# Patient Record
Sex: FEMALE | Race: ASIAN | NOT HISPANIC OR LATINO | Employment: UNEMPLOYED | URBAN - METROPOLITAN AREA
[De-identification: names, ages, dates, MRNs, and addresses within clinical notes are randomized per-mention and may not be internally consistent; named-entity substitution may affect disease eponyms.]

---

## 2017-01-03 ENCOUNTER — RADIANT APPOINTMENT (OUTPATIENT)
Dept: ULTRASOUND IMAGING | Facility: CLINIC | Age: 47
End: 2017-01-03
Attending: FAMILY MEDICINE
Payer: COMMERCIAL

## 2017-01-03 DIAGNOSIS — N83.201 RIGHT OVARIAN CYST: ICD-10-CM

## 2017-01-03 DIAGNOSIS — N92.0 MENORRHAGIA WITH REGULAR CYCLE: ICD-10-CM

## 2017-01-03 DIAGNOSIS — N94.6 DYSMENORRHEA: ICD-10-CM

## 2017-01-03 DIAGNOSIS — D25.1 INTRAMURAL LEIOMYOMA OF UTERUS: ICD-10-CM

## 2017-01-03 PROCEDURE — 76830 TRANSVAGINAL US NON-OB: CPT | Performed by: OBSTETRICS & GYNECOLOGY

## 2017-01-03 PROCEDURE — 76856 US EXAM PELVIC COMPLETE: CPT | Performed by: OBSTETRICS & GYNECOLOGY

## 2017-01-07 ENCOUNTER — TRANSFERRED RECORDS (OUTPATIENT)
Dept: HEALTH INFORMATION MANAGEMENT | Facility: CLINIC | Age: 47
End: 2017-01-07

## 2017-01-09 NOTE — PROGRESS NOTES
Quick Note:    Please call pt with results below:     Multiple uterine fibroids . But no further ovarian cyst noted. However, thickened endometrial lining seen - recommend that pt see ob/gyn specialists as referred 12/5/2016. Please fax this result to them.  ______

## 2017-03-01 DIAGNOSIS — F41.1 ANXIETY STATE: ICD-10-CM

## 2017-03-01 NOTE — TELEPHONE ENCOUNTER
Sertraline HCl Oral Tablet 100 MG     Last Written Prescription Date: 08/18/2016  Last Fill Quantity: 90, # refills: 0  Last Office Visit with [facility] primary care provider:  12/05/2016        Last PHQ-9 score on record=   PHQ-9 SCORE 12/19/2014   Total Score 0

## 2017-03-02 RX ORDER — SERTRALINE HYDROCHLORIDE 100 MG/1
TABLET, FILM COATED ORAL
Qty: 90 TABLET | Refills: 0 | Status: SHIPPED | OUTPATIENT
Start: 2017-03-02 | End: 2017-06-25

## 2017-03-02 NOTE — TELEPHONE ENCOUNTER
For anxiety.    Prescription approved per [facility] Refill Protocol.  Gale Hogan RN

## 2017-04-20 ENCOUNTER — OFFICE VISIT (OUTPATIENT)
Dept: FAMILY MEDICINE | Facility: CLINIC | Age: 47
End: 2017-04-20
Payer: COMMERCIAL

## 2017-04-20 VITALS
SYSTOLIC BLOOD PRESSURE: 102 MMHG | TEMPERATURE: 98.2 F | HEART RATE: 93 BPM | HEIGHT: 65 IN | BODY MASS INDEX: 18.63 KG/M2 | WEIGHT: 111.8 LBS | OXYGEN SATURATION: 97 % | DIASTOLIC BLOOD PRESSURE: 68 MMHG

## 2017-04-20 DIAGNOSIS — R10.813 TENDERNESS AT MCBURNEY'S POINT: ICD-10-CM

## 2017-04-20 DIAGNOSIS — R10.31 RLQ ABDOMINAL PAIN: ICD-10-CM

## 2017-04-20 DIAGNOSIS — R19.7 DIARRHEA, UNSPECIFIED TYPE: Primary | ICD-10-CM

## 2017-04-20 LAB
BASOPHILS # BLD AUTO: 0 10E9/L (ref 0–0.2)
BASOPHILS NFR BLD AUTO: 0.1 %
DIFFERENTIAL METHOD BLD: NORMAL
EOSINOPHIL # BLD AUTO: 0.2 10E9/L (ref 0–0.7)
EOSINOPHIL NFR BLD AUTO: 2.5 %
ERYTHROCYTE [DISTWIDTH] IN BLOOD BY AUTOMATED COUNT: 11.9 % (ref 10–15)
HCT VFR BLD AUTO: 42.9 % (ref 35–47)
HGB BLD-MCNC: 15.3 G/DL (ref 11.7–15.7)
LYMPHOCYTES # BLD AUTO: 1.1 10E9/L (ref 0.8–5.3)
LYMPHOCYTES NFR BLD AUTO: 17 %
MCH RBC QN AUTO: 31.2 PG (ref 26.5–33)
MCHC RBC AUTO-ENTMCNC: 35.7 G/DL (ref 31.5–36.5)
MCV RBC AUTO: 88 FL (ref 78–100)
MONOCYTES # BLD AUTO: 0.9 10E9/L (ref 0–1.3)
MONOCYTES NFR BLD AUTO: 13 %
NEUTROPHILS # BLD AUTO: 4.5 10E9/L (ref 1.6–8.3)
NEUTROPHILS NFR BLD AUTO: 67.4 %
PLATELET # BLD AUTO: 234 10E9/L (ref 150–450)
RBC # BLD AUTO: 4.9 10E12/L (ref 3.8–5.2)
WBC # BLD AUTO: 6.7 10E9/L (ref 4–11)

## 2017-04-20 PROCEDURE — 99214 OFFICE O/P EST MOD 30 MIN: CPT | Performed by: FAMILY MEDICINE

## 2017-04-20 PROCEDURE — 36415 COLL VENOUS BLD VENIPUNCTURE: CPT | Performed by: FAMILY MEDICINE

## 2017-04-20 PROCEDURE — 85025 COMPLETE CBC W/AUTO DIFF WBC: CPT | Performed by: FAMILY MEDICINE

## 2017-04-20 RX ORDER — DIPHENOXYLATE HCL/ATROPINE 2.5-.025MG
2 TABLET ORAL 4 TIMES DAILY PRN
Qty: 30 TABLET | Refills: 0 | Status: SHIPPED | OUTPATIENT
Start: 2017-04-20 | End: 2018-01-24

## 2017-04-20 NOTE — PROGRESS NOTES
SUBJECTIVE:                                                    Jono Templeton is a 46 year old female who presents to clinic today for the following health issues:    Diarrhea     Onset: 04/17/2017    Description:   Consistency of stool: loose, watery, yellow and explosive  Blood in stool: no   Number of loose stools in past 24 hours: 20x/day     Progression of Symptoms:  same    Accompanying Signs & Symptoms:  Fever: no   Nausea or vomiting; YES- nausea since Monday, no emesis at all.   Abdominal pain: YES- off and on since Monday, lower abdominal, crampy dull pain  Episodes of constipation: no   Weight loss: no   No melena or hematochezia. Has lost 3-4 pounds.   Not lightheaded or dizzy right now.   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   No recent abx.     Alleviating factors:   None    Able to keep down bread , rice, bananas.        Therapies Tried and outcome:  Chinese Herbal Medicine; Outcome: no relief    Patient Active Problem List   Diagnosis     Esophageal reflux     Anxiety state     Generalized anxiety disorder     CARDIOVASCULAR SCREENING; LDL GOAL LESS THAN 160     Osteopenia- left hip fr. 2012 DEXA scan from [place], [place]     Low back pain     Family history of Sjogren's disease- mother      Family history of rheumatoid arthritis- MGM and mother     Hand joint pain     Symptomatic premature menopausal symptoms - still having menses     Right ovarian cyst-complex right ovarian cystic lesion measures 4.9 cm- f/u u/s in late 11/2013 ordered      "Dysmenorrhea     Menorrhagia with regular cycle     Intramural leiomyoma of uterus- 0.9cm in 10/2013     Excessive thirst- at night mostly     Family history of keratoconjunctivitis sicca in Sjogren's syndrome       Current Outpatient Prescriptions   Medication Sig Dispense Refill     sertraline (ZOLOFT) 100 MG tablet TAKE ONE TABLET BY MOUTH ONE TIME DAILY  90 tablet 0     albuterol (PROAIR HFA, PROVENTIL HFA, VENTOLIN HFA) 108 (90 BASE) MCG/ACT inhaler Inhale 2 puffs into the lungs every 6 hours as needed for shortness of breath / dyspnea or wheezing 1 Inhaler 1     calcium carbonate (OS-LAUREN 500 MG [place]. [place]) 500 MG tablet Take by mouth 2 times daily 180 tablet 3     cholecalciferol (VITAMIN D) 1000 UNIT tablet Take 1 tablet (1,000 Units) by mouth daily 100 tablet 3     Probiotic Product (PROBIOTIC PEARLS) CAPS Take by mouth daily            Allergies   Allergen Reactions     Seasonal Allergies           Problem list and histories reviewed & adjusted, as indicated.  Additional history: as documented    Reviewed and updated as needed this visit by clinical staff  Tobacco  Allergies  Meds  Med Hx  Surg Hx  Fam Hx  Soc Hx      Reviewed and updated as needed this visit by Provider         ROS:   ROS: 12 point ROS neg other than the symptoms noted above    OBJECTIVE:                                                    /68 (BP Location: Left arm, Patient Position: Chair, Cuff Size: Adult Regular)  Pulse 93  Temp 98.2  F (36.8  C) (Oral)  Ht 5' 4.5\" (1.638 m)  Wt 111 lb 12.8 oz (50.7 kg)  LMP 04/14/2017 (Approximate)  SpO2 97%  BMI 18.89 kg/m2  Body mass index is 18.89 kg/(m^2).   GENERAL: healthy, alert, well nourished, well hydrated, no distress  HENT: ear canals- normal; TMs- normal; Nose- normal; Mouth- no ulcers, no lesions  NECK: no tenderness, no adenopathy, no asymmetry, no masses, no stiffness; thyroid- normal to palpation  RESP: lungs clear to auscultation - no rales, no rhonchi, no " wheezes  CV: regular rates and rhythm, normal S1 S2, no S3 or S4 and no murmur, no click or rub -  ABDOMEN: soft, significant point tenderness at McBurney's point, otherwise no other tenderness, no  hepatosplenomegaly, no masses, normal bowel sounds  MS: extremities- no gross deformities noted, no edema.     Diagnostic test results:  Results for orders placed or performed in visit on 04/20/17 (from the past 24 hour(s))   CBC with platelets differential   Result Value Ref Range    WBC 6.7 4.0 - 11.0 10e9/L    RBC Count 4.90 3.8 - 5.2 10e12/L    Hemoglobin 15.3 11.7 - 15.7 g/dL    Hematocrit 42.9 35.0 - 47.0 %    MCV 88 78 - 100 fl    MCH 31.2 26.5 - 33.0 pg    MCHC 35.7 31.5 - 36.5 g/dL    RDW 11.9 10.0 - 15.0 %    Platelet Count 234 150 - 450 10e9/L    Diff Method Automated Method     % Neutrophils 67.4 %    % Lymphocytes 17.0 %    % Monocytes 13.0 %    % Eosinophils 2.5 %    % Basophils 0.1 %    Absolute Neutrophil 4.5 1.6 - 8.3 10e9/L    Absolute Lymphocytes 1.1 0.8 - 5.3 10e9/L    Absolute Monocytes 0.9 0.0 - 1.3 10e9/L    Absolute Eosinophils 0.2 0.0 - 0.7 10e9/L    Absolute Basophils 0.0 0.0 - 0.2 10e9/L        ASSESSMENT/PLAN:                                                        ICD-10-CM    1. Diarrhea, unspecified type R19.7 Fecal Lactoferrin     Clostridium difficile toxin B PCR     Enteric Bacteria and Virus Panel by RIVER Stool     Giardia antigen     CBC with platelets differential     diphenoxylate-atropine (LOMOTIL) 2.5-0.025 MG per tablet   2. RLQ abdominal pain- only tender or painful when pushed upon R10.31 CBC with platelets differential   3. Tenderness at McBurney's point R19.8 CBC with platelets differential       See Patient Instructions. Please, call or return to clinic or go to the ER immediately if signs or symptoms worsen or fail to improve as anticipated.  With normal WBC will hold on abdominal CT scan right now.   Pt has no abdominal pain with walking or riding in the car.  Discussed with  pt symptoms of appendicitis also in detail today.       The 'BRAT' diet (bananas, rice, applesauce, toast (no butter)  is suggested, then progress to diet as tolerated as symptoms silvia. Please , call if bloody stools, persistent diarrhea, vomiting, fever or abdominal pain.      For really loose stools w/ abdominal cramping : try imodium AD per over the counter box directions.     For increased abdominal gas/bloating : try simethicone (generic for Gas-x) per over the counter box directions.     Please, call or return to clinic or go to the ER immediately if signs or symptoms worsen or fail to improve as anticipated.             Giselle Mcdonnell MD    [facility]

## 2017-04-20 NOTE — MR AVS SNAPSHOT
After Visit Summary   4/20/2017    Jono Templeton    MRN: [id number]           Patient Information     Date Of Birth          1970        Visit Information        Provider Department      4/20/2017 9:00 AM Giselle Mcdonnell MD [facility] [place]        Today's Diagnoses     Diarrhea, unspecified type    -  1    RLQ abdominal pain- only tender or painful when pushed upon        Tenderness at McBurney's point          Care Instructions      The 'BRAT' diet (bananas, rice, applesauce, toast (no butter)  is suggested, then progress to diet as tolerated as symptoms silvia. Please , call if bloody stools, persistent diarrhea, vomiting, fever or abdominal pain.      For really loose stools w/ abdominal cramping after you collect your stool samples : try imodium AD per over the counter box directions.     For increased abdominal gas/bloating : try simethicone (generic for Gas-x) per over the counter box directions.     Please, call or return to clinic or go to the ER immediately if signs or symptoms worsen or fail to improve as anticipated.      DIARRHEA, uncertain cause (Adult, Report Pending)    Diarrhea has several possible causes. Common  stomach flu  is caused by a virus. Food poisoning, bacteria or parasites are other causes for diarrhea. Only diarrhea caused by bacteria or parasites requires treatment with an antibiotic. Diarrhea from a virus or food poisoning improves with simple home treatment.  A stool sample is needed to make the diagnosis of an infection with bacteria or parasites. Up to three stool specimens may be required to diagnose This may take up to two days to get the result. It may be necessary to wait until the stool test is complete to make the diagnosis and select the best antibiotic to prescribe.  HOME CARE:    If symptoms are severe, rest at home for the next 24 hours or until you are feeling better.    You may use acetaminophen (Tylenol) or ibuprofen (Motrin, Advil)  to control fever, unless another medicine was prescribed. [NOTE: If you have chronic liver or kidney disease or ever had a stomach ulcer or GI bleeding, talk with your doctor before using these medicines.] (Aspirin should never be used in anyone under 18 years of age who is ill with a fever. It may cause severe liver damage.)    Avoid tobacco, caffeine and alcohol, which may worsen your symptoms.    If anti-diarrhea medicine was prescribed, take this only as directed. Sometimes anti-diarrhea medicine can make your condition worse if the cause is an infectious diarrhea. Therefore, anti-diarrhea medicine should not be taken for this condition unless advised by your doctor.  DURING THE FIRST 12-24 HOURS follow the diet below:    BEVERAGES: Sport drinks like Gatorade, soft drinks without caffeine; ginger ale, mineral water (plain or flavored), decaffeinated tea and coffee.    SOUPS: Clear broth, consommé and bouillon    DESSERTS: Plain gelatin (Jell-O), popsicles and fruit juice bars.  DURING THE NEXT 24 HOURS you may add the following to the above:    Hot cereal, plain toast, bread, rolls, crackers    Plain noodles, rice, mashed potatoes, chicken noodle or rice soup    Unsweetened canned fruit (avoid pineapple), bananas    Limit fat intake to less than 15 grams per day by avoiding margarine, butter, oils, mayonnaise, sauces, gravies, fried foods, peanut butter, meat, poultry and fish.    Limit fiber; avoid raw or cooked vegetables, fresh fruits (except bananas) and bran cereals.    Limit caffeine and chocolate. No spices or seasonings except salt.  DURING THE NEXT 24 HOURS  Gradually resume a normal diet, as you feel better and your symptoms lessen.  FOLLOW UP with your doctor or as advised if you are not improving over the next two days. If you were asked to bring a specimen from home, bring the sample on the day of collection. You may call in 2 days (or as directed) for the results.  GET PROMPT MEDICAL ATTENTION if  any of the following occur:    Increasing abdominal pain or constant lower right abdominal pain    Continued vomiting (unable to keep liquids down)    Frequent diarrhea (more than 5 times a day)    Blood in vomit or stool (black or red color)    Reduced oral intake    Dark urine, reduced urine output    Weakness, dizziness, fainting    Drowsiness, confusion, stiff neck or seizure    Fever of 100.4 F (38 C) oral or higher, not better with fever medication    New rash    7198-6471 Andie \[employer] [facility] [facility] [place] [facility]\"", [address] [place] [address], [place] [address]. All rights reserved. This information is not intended as a substitute for professional medical care. Always follow your healthcare professional's instructions.                    Thank you for choosing [facility]  for your Health Care. It was a pleasure seeing you at your visit today. Please contact us with any questions or concerns you may have.                   Giselle Mcdonnell MD                                  To reach your [facility] care team after hours call:   [phone]    Our clinic hours are:     Monday- 7:30 am - 7:00 pm                             Tuesday through Friday- 7:30 am - 5:00 pm                                        Saturday- 8:00 am - 12:00 pm                  Phone:  [phone]    Our pharmacy hours are:     Monday  8:00 am to 7:00 pm      Tuesday through Friday 8:00am to 6:00pm                        Saturday - 9:00 am to 1:00 pm      Sunday : Closed.              Phone:  [phone]      There is also information available at our web site:  [email or web address]    If your provider ordered any lab tests and you do not receive the results within 10 business days, please call the clinic.    If you need a medication refill please contact your pharmacy.  Please allow 2 business days for your refill to be completed.    Our clinic offers telephone visits and e visits.  Please ask one of your team members to explain  more.      Use [employer]list[employer] (secure email communication and access to your chart) to send your primary care provider a message or make an appointment. Ask someone on your Team how to sign up for [employer].               Abdominal Pain, Possible Appendicitis, Repeat Exam (Female)    Based on your visit today, the exact cause of your abdominal (stomach) pain is not certain. You have some of the early symptoms of appendicitis. Because these symptoms can be similar to other stomach problems, however, the diagnosis cannot be made at this time.  Waiting for more time to pass and repeating the exam is the best way to find out whether you have appendicitis. Within the next 12 to 24 hours, the cause of your stomach pain should become clear. During this time, you need to watch for any new symptoms or worsening of your condition. (See below.)  Symptoms  The most common symptom of appendicitis is pain in the abdomen. Since the appendix is in the lower right part of the abdomen, that is the most common place to have pain. Typically, the pain starts near the navel (belly button) and then moves lower and to the right over hours. The pain also tends to worsen over time. It may hurt especially when moving, straining, or coughing.  Other symptoms include:    Loss of appetite    Nausea, vomiting    Low-grade fever    Constipation or diarrhea    Not passing gas  Home care    Rest until your next exam. No lifting, straining, or strenuous activities.    You may be told not to eat or drink anything before your next exam. Be sure to follow any instructions you re given. If you are allowed to eat:    Eat a diet low in fiber (called a low-residue diet). Foods allowed include refined breads, white rice, fruit and vegetable juices without pulp, and tender meats. These foods will pass more easily through the colon.    Avoid whole-grain foods, whole fruits and vegetables, meats, seeds and nuts, fried or fatty foods, dairy, and alcohol and spicy  foods.   Follow-up care  Return for your next exam as directed.  When to seek medical advice  Call your healthcare provider or seek treatment right away if:    You have a fever of 100.4 F (38 C) or higher, or as directed by your provider.    Your pain gets worse or moves to the lower right part of your abdomen.    You have nausea or vomiting that worsens.    You have diarrhea or constipation that worsens.    You have blood in your vomit or stool (dark red or black color).    Your abdomen becomes swollen.    You become weak or dizzy.    You think you might be pregnant or have unexpected vaginal bleeding.  Call 911  Call 911 right away if:    You have trouble breathing.    You become very confused or sleepy.    You feel faint or lose consciousness.    You have an usually fast heart rate.    2439-1217 [employer]. [address] [place] [address]. All rights reserved. This information is not intended as a substitute for professional medical care. Always follow your healthcare professional's instructions.              Follow-ups after your visit        Your next 10 appointments already scheduled     Apr 26, 2017  2:00 PM CDT   MAM[facility]GRAM with [email or web address]   [facility]    [address] [place] [id number] [phone]           Do not wear any body powder, lotions, deodorant or perfume the day of the exam. Bring a list of all medications, especially hormones.  If your last mammogram was not done at [facility], please bring your mammogram films. We will need the name of your MD/PA to send a copy of your report.              Future tests that were ordered for you today     Open Future Orders        Priority Expected Expires Ordered    Fecal Lactoferrin Routine  4/20/2018 4/20/2017    Clostridium difficile toxin B PCR Routine 4/20/2017 5/20/2017 4/20/2017    Enteric Bacteria and Virus Panel by RIVER Stool Routine  4/20/2018 4/20/2017    Giardia  "antigen Routine  4/20/2018 4/20/2017            Who to contact     If you have questions or need follow up information about today's clinic visit or your schedule please contact [facility] directly at [phone].  Normal or non-critical lab and imaging results will be communicated to you by MyChart, letter or phone within 4 business days after the clinic has received the results. If you do not hear from us within 7 days, please contact the clinic through [employer] or phone. If you have a critical or abnormal lab result, we will notify you by phone as soon as possible.  Submit refill requests through [employer] or call your pharmacy and they will forward the refill request to us. Please allow 3 business days for your refill to be completed.          Additional Information About Your Visit        [facility] Information     [employer] gives you secure access to your electronic health record. If you see a primary care provider, you can also send messages to your care team and make appointments. If you have questions, please call your primary care clinic.  If you do not have a primary care provider, please call [phone] and they will assist you.        Care EveryWhere ID     This is your Care EveryWhere ID. This could be used by other organizations to access your [place] medical records  [id number]        Your Vitals Were     Pulse Temperature Height Last Period Pulse Oximetry BMI (Body Mass Index)    93 98.2  F (36.8  C) (Oral) 5' 4.5\" (1.638 m) 04/14/2017 (Approximate) 97% 18.89 kg/m2       Blood Pressure from Last 3 Encounters:   04/20/17 102/68   12/05/16 92/70   11/01/16 100/60    Weight from Last 3 Encounters:   04/20/17 111 lb 12.8 oz (50.7 kg)   12/05/16 115 lb (52.2 kg)   11/01/16 115 lb (52.2 kg)              We Performed the Following     CBC with platelets differential          Today's Medication Changes          These changes are accurate as of: 4/20/17 10:30 AM.  If you have any questions, " ask your nurse or doctor.               Start taking these medicines.        Dose/Directions    diphenoxylate-atropine 2.5-0.025 MG per tablet   Commonly known as:  LOMOTIL   Used for:  Diarrhea, unspecified type   Started by:  Giselle Mcdonnell MD        Dose:  2 tablet   Take 2 tablets by mouth 4 times daily as needed for diarrhea   Quantity:  30 tablet   Refills:  0            Where to get your medicines      Some of these will need a paper prescription and others can be bought over the counter.  Ask your nurse if you have questions.     Bring a paper prescription for each of these medications     diphenoxylate-atropine 2.5-0.025 MG per tablet                Primary Care Provider Office Phone # Fax #    Giselle Mcdonnell [phone]       [address] [address]        Thank you!     Thank you for choosing [facility]  for your care. Our goal is always to provide you with excellent care. Hearing back from our patients is one way we can continue to improve our services. Please take a few minutes to complete the written survey that you may receive in the mail after your visit with us. Thank you!             Your Updated Medication List - Protect others around you: Learn how to safely use, store and throw away your medicines at www.disposemymeds.org.          This list is accurate as of: 4/20/17 10:30 AM.  Always use your most recent med list.                   Brand Name Dispense Instructions for use    albuterol 108 (90 BASE) MCG/ACT Inhaler    PROAIR HFA/PROVENTIL HFA/VENTOLIN HFA    1 Inhaler    Inhale 2 puffs into the lungs every 6 hours as needed for shortness of breath / dyspnea or wheezing       calcium carbonate 500 MG tablet    OS-LAUREN 500 mg [place]. Ca    180 tablet    Take by mouth 2 times daily       cholecalciferol 1000 UNIT tablet    vitamin D    100 tablet    Take 1 tablet (1,000 Units) by mouth daily        diphenoxylate-atropine 2.5-0.025 MG per tablet    LOMOTIL    30 tablet    Take 2 tablets by mouth 4 times daily as needed for diarrhea       PROBIOTIC PEARLS Caps      Take by mouth daily       sertraline 100 MG tablet    ZOLOFT    90 tablet    TAKE ONE TABLET BY MOUTH ONE TIME DAILY

## 2017-04-20 NOTE — PATIENT INSTRUCTIONS
The 'BRAT' diet (bananas, rice, applesauce, toast (no butter)  is suggested, then progress to diet as tolerated as symptoms silvia. Please , call if bloody stools, persistent diarrhea, vomiting, fever or abdominal pain.      For really loose stools w/ abdominal cramping after you collect your stool samples : try imodium AD per over the counter box directions.     For increased abdominal gas/bloating : try simethicone (generic for Gas-x) per over the counter box directions.     Please, call or return to clinic or go to the ER immediately if signs or symptoms worsen or fail to improve as anticipated.      DIARRHEA, uncertain cause (Adult, Report Pending)    Diarrhea has several possible causes. Common  stomach flu  is caused by a virus. Food poisoning, bacteria or parasites are other causes for diarrhea. Only diarrhea caused by bacteria or parasites requires treatment with an antibiotic. Diarrhea from a virus or food poisoning improves with simple home treatment.  A stool sample is needed to make the diagnosis of an infection with bacteria or parasites. Up to three stool specimens may be required to diagnose This may take up to two days to get the result. It may be necessary to wait until the stool test is complete to make the diagnosis and select the best antibiotic to prescribe.  HOME CARE:    If symptoms are severe, rest at home for the next 24 hours or until you are feeling better.    You may use acetaminophen (Tylenol) or ibuprofen (Motrin, Advil) to control fever, unless another medicine was prescribed. [NOTE: If you have chronic liver or kidney disease or ever had a stomach ulcer or GI bleeding, talk with your doctor before using these medicines.] (Aspirin should never be used in anyone under 18 years of age who is ill with a fever. It may cause severe liver damage.)    Avoid tobacco, caffeine and alcohol, which may worsen your symptoms.    If anti-diarrhea medicine was prescribed, take this only as directed.  Sometimes anti-diarrhea medicine can make your condition worse if the cause is an infectious diarrhea. Therefore, anti-diarrhea medicine should not be taken for this condition unless advised by your doctor.  DURING THE FIRST 12-24 HOURS follow the diet below:    BEVERAGES: Sport drinks like Gatorade, soft drinks without caffeine; ginger ale, mineral water (plain or flavored), decaffeinated tea and coffee.    SOUPS: Clear broth, consommé and bouillon    DESSERTS: Plain gelatin (Jell-O), popsicles and fruit juice bars.  DURING THE NEXT 24 HOURS you may add the following to the above:    Hot cereal, plain toast, bread, rolls, crackers    Plain noodles, rice, mashed potatoes, chicken noodle or rice soup    Unsweetened canned fruit (avoid pineapple), bananas    Limit fat intake to less than 15 grams per day by avoiding margarine, butter, oils, mayonnaise, sauces, gravies, fried foods, peanut butter, meat, poultry and fish.    Limit fiber; avoid raw or cooked vegetables, fresh fruits (except bananas) and bran cereals.    Limit caffeine and chocolate. No spices or seasonings except salt.  DURING THE NEXT 24 HOURS  Gradually resume a normal diet, as you feel better and your symptoms lessen.  FOLLOW UP with your doctor or as advised if you are not improving over the next two days. If you were asked to bring a specimen from home, bring the sample on the day of collection. You may call in 2 days (or as directed) for the results.  GET PROMPT MEDICAL ATTENTION if any of the following occur:    Increasing abdominal pain or constant lower right abdominal pain    Continued vomiting (unable to keep liquids down)    Frequent diarrhea (more than 5 times a day)    Blood in vomit or stool (black or red color)    Reduced oral intake    Dark urine, reduced urine output    Weakness, dizziness, fainting    Drowsiness, confusion, stiff neck or seizure    Fever of 100.4 F (38 C) oral or higher, not better with fever medication    New rash     6210-1102 [facility], [address] [place] [address], [place] [address]. All rights reserved. This information is not intended as a substitute for professional medical care. Always follow your healthcare professional's instructions.                    Thank you for choosing [facility]  for your Health Care. It was a pleasure seeing you at your visit today. Please contact us with any questions or concerns you may have.                   Giselle Mcdonnell MD                                  To reach your [facility] care team after hours call:   [phone]    Our clinic hours are:     Monday- 7:30 am - 7:00 pm                             Tuesday through Friday- 7:30 am - 5:00 pm                                        Saturday- 8:00 am - 12:00 pm                  Phone:  [phone]    Our pharmacy hours are:     Monday  8:00 am to 7:00 pm      Tuesday through Friday 8:00am to 6:00pm                        Saturday - 9:00 am to 1:00 pm      Sunday : Closed.              Phone:  [phone]      There is also information available at our web site:  [email or web address]    If your provider ordered any lab tests and you do not receive the results within 10 business days, please call the clinic.    If you need a medication refill please contact your pharmacy.  Please allow 2 business days for your refill to be completed.    Our clinic offers telephone visits and e visits.  Please ask one of your team members to explain more.      Use [employer] (secure email communication and access to your chart) to send your primary care provider a message or make an appointment. Ask someone on your Team how to sign up for [email or web address].               Abdominal Pain, Possible Appendicitis, Repeat Exam (Female)    Based on your visit today, the exact cause of your abdominal (stomach) pain is not certain. You have some of the early symptoms of appendicitis. Because these symptoms can be similar to other stomach  problems, however, the diagnosis cannot be made at this time.  Waiting for more time to pass and repeating the exam is the best way to find out whether you have appendicitis. Within the next 12 to 24 hours, the cause of your stomach pain should become clear. During this time, you need to watch for any new symptoms or worsening of your condition. (See below.)  Symptoms  The most common symptom of appendicitis is pain in the abdomen. Since the appendix is in the lower right part of the abdomen, that is the most common place to have pain. Typically, the pain starts near the navel (belly button) and then moves lower and to the right over hours. The pain also tends to worsen over time. It may hurt especially when moving, straining, or coughing.  Other symptoms include:    Loss of appetite    Nausea, vomiting    Low-grade fever    Constipation or diarrhea    Not passing gas  Home care    Rest until your next exam. No lifting, straining, or strenuous activities.    You may be told not to eat or drink anything before your next exam. Be sure to follow any instructions you re given. If you are allowed to eat:    Eat a diet low in fiber (called a low-residue diet). Foods allowed include refined breads, white rice, fruit and vegetable juices without pulp, and tender meats. These foods will pass more easily through the colon.    Avoid whole-grain foods, whole fruits and vegetables, meats, seeds and nuts, fried or fatty foods, dairy, and alcohol and spicy foods.   Follow-up care  Return for your next exam as directed.  When to seek medical advice  Call your healthcare provider or seek treatment right away if:    You have a fever of 100.4 F (38 C) or higher, or as directed by your provider.    Your pain gets worse or moves to the lower right part of your abdomen.    You have nausea or vomiting that worsens.    You have diarrhea or constipation that worsens.    You have blood in your vomit or stool (dark red or black  color).    Your abdomen becomes swollen.    You become weak or dizzy.    You think you might be pregnant or have unexpected vaginal bleeding.  Call 911  Call 911 right away if:    You have trouble breathing.    You become very confused or sleepy.    You feel faint or lose consciousness.    You have an usually fast heart rate.    [id number] [employer] [address] [place] [address], [place] [address]. All rights reserved. This information is not intended as a substitute for professional medical care. Always follow your healthcare professional's instructions.

## 2017-04-20 NOTE — NURSING NOTE
"Chief Complaint   Patient presents with     Diarrhea       Initial /68 (BP Location: Left arm, Patient Position: Chair, Cuff Size: Adult Regular)  Pulse 93  Temp 98.2  F (36.8  C) (Oral)  Ht 5' 4.5\" (1.638 m)  Wt 111 lb 12.8 oz (50.7 kg)  LMP 04/14/2017 (Approximate)  SpO2 97%  BMI 18.89 kg/m2 Estimated body mass index is 18.89 kg/(m^2) as calculated from the following:    Height as of this encounter: 5' 4.5\" (1.638 m).    Weight as of this encounter: 111 lb 12.8 oz (50.7 kg).  Medication Reconciliation: complete   Maddison Rojas CMA  "

## 2017-04-20 NOTE — LETTER
[address] [address]  [phone]          April 20, 2017    RE:  Jono Templeton                                                                                                                                                       [address] [facility] [address]      To whom it may concern:    Jono Templeton is under my professional care for    Diarrhea, unspecified type and RLQ abdominal pain.   While we investigate the cause of her symptoms, please excuse her from work today and tomorrow.  She  Should be able to return to work with the following: No restrictions on or about 4/24/2017.      Sincerely,       Giselle Mcdonnell M.D.

## 2017-04-26 DIAGNOSIS — Z12.31 VISIT FOR SCREENING MAMMOGRAM: ICD-10-CM

## 2017-04-26 PROCEDURE — G0202 SCR MAMMO BI INCL CAD: HCPCS | Mod: TC

## 2017-06-25 DIAGNOSIS — F41.1 ANXIETY STATE: ICD-10-CM

## 2017-06-26 NOTE — TELEPHONE ENCOUNTER
Zoloft     Last Written Prescription Date: 03/02/2017  Last Fill Quantity: 90, # refills: 0  Last Office Visit with [facility] primary care provider:  12/05/2016        Last PHQ-9 score on record=   PHQ-9 SCORE 12/19/2014   Total Score 0

## 2017-06-27 RX ORDER — SERTRALINE HYDROCHLORIDE 100 MG/1
TABLET, FILM COATED ORAL
Qty: 90 TABLET | Refills: 1 | Status: SHIPPED | OUTPATIENT
Start: 2017-06-27 | End: 2018-01-29

## 2017-06-27 NOTE — TELEPHONE ENCOUNTER
Prescription approved per [facility] Refill Protocol.  Taken for anxiety so PHQ-9 not needed.    OLIMPIA Hatfield, RN, PHN  [facility][note 2]

## 2018-01-24 ENCOUNTER — OFFICE VISIT (OUTPATIENT)
Dept: FAMILY MEDICINE | Facility: CLINIC | Age: 48
End: 2018-01-24
Payer: COMMERCIAL

## 2018-01-24 VITALS
WEIGHT: 113 LBS | HEART RATE: 91 BPM | TEMPERATURE: 98.7 F | HEIGHT: 65 IN | SYSTOLIC BLOOD PRESSURE: 96 MMHG | DIASTOLIC BLOOD PRESSURE: 61 MMHG | BODY MASS INDEX: 18.83 KG/M2 | OXYGEN SATURATION: 98 %

## 2018-01-24 DIAGNOSIS — J06.9 ACUTE URI: Primary | ICD-10-CM

## 2018-01-24 DIAGNOSIS — R50.9 FEVER, UNSPECIFIED FEVER CAUSE: ICD-10-CM

## 2018-01-24 DIAGNOSIS — R05.9 COUGH: ICD-10-CM

## 2018-01-24 LAB
FLUAV+FLUBV AG SPEC QL: NEGATIVE
FLUAV+FLUBV AG SPEC QL: NEGATIVE
SPECIMEN SOURCE: NORMAL

## 2018-01-24 PROCEDURE — 99213 OFFICE O/P EST LOW 20 MIN: CPT | Performed by: FAMILY MEDICINE

## 2018-01-24 PROCEDURE — 87804 INFLUENZA ASSAY W/OPTIC: CPT | Performed by: FAMILY MEDICINE

## 2018-01-24 RX ORDER — FLUOCINONIDE 0.5 MG/G
OINTMENT TOPICAL
COMMUNITY
Start: 2017-03-14 | End: 2019-09-23

## 2018-01-24 RX ORDER — OSELTAMIVIR PHOSPHATE 75 MG/1
75 CAPSULE ORAL 2 TIMES DAILY
Qty: 10 CAPSULE | Refills: 0 | Status: SHIPPED | OUTPATIENT
Start: 2018-01-24 | End: 2018-01-29

## 2018-01-24 NOTE — MR AVS SNAPSHOT
After Visit Summary   1/24/2018    Jono Templeton    MRN: [id number]           Patient Information     Date Of Birth          1970        Visit Information        Provider Department      1/24/2018 10:00 AM Rick Arroyo MD [facility]        Today's Diagnoses     Acute URI    -  1    Cough        Fever, unspecified fever cause          Care Instructions    Tamiflu, as directed twice daily for the next 5 days    Use OTC Mucinex DM and warm showers to help with congestion    Push fluids and rest    Follow up if symptoms worsen or don't improve       [facility]                        To reach your care team during and after hours:   [phone]  To reach our pharmacy:        [phone]    Clinic Hours                        Our clinic hours are:    Monday   7:30 am to 7:00 pm                  Tuesday through Friday 7:30 am to 5:00 pm                             Saturday   8:00 am to 12:00 pm      Sunday   Closed      Pharmacy Hours                        Our pharmacy hours are:    Monday   8:30 am to 7:00 pm       Tuesday to Friday  8:30 am to 6:00 pm                       Saturday    9:00 am to 1:00 pm              Sunday    Closed              There is also information available at our web site:  [email or web address]    If your provider ordered any lab tests and you do not receive the results within 10 business days, please call the clinic.    If you need a medication refill please contact your pharmacy.  Please allow 2-3 business days for your refill to be completed.    Our clinic offers telephone visits and e visits.  Please ask one of your team members to explain more.      Use [employer] (secure email communication and access to your chart) to send your primary care provider a message or make an appointment. Ask someone on your Team how to sign up for [employer].  Immunizations                      Immunization History   Administered Date(s) Administered     HepB  04/22/2011     Influenza (IIV3) PF 09/18/2013     Influenza Vaccine IM 3yrs+ 4 Valent IIV4 12/05/2016, 11/14/2017     Influenza Vaccine, 3 YRS +, IM (QUADRIVALENT W/PRESERVATIVES) 12/19/2014     Mantoux Tuberculin Skin Test 04/20/2011     TDAP Vaccine (Adacel) 04/20/2011        Health Maintenance                         Health Maintenance Due   Topic Date Due     Wellness Visit with your Primary Provider - yearly  12/05/2017               Follow-ups after your visit        Your next 10 appointments already scheduled     Feb 02, 2018  2:00 PM CST   PHYSICAL with Giselle Mcdonnell MD   [facility]    [address] [place] [id number] [phone]              Who to contact     If you have questions or need follow up information about today's clinic visit or your schedule please contact [facility] directly at [phone].  Normal or non-critical lab and imaging results will be communicated to you by [email or web address], letter or phone within 4 business days after the clinic has received the results. If you do not hear from us within 7 days, please contact the clinic through MoboFreet or phone. If you have a critical or abnormal lab result, we will notify you by phone as soon as possible.  Submit refill requests through [employer] Go or call your pharmacy and they will forward the refill request to us. Please allow 3 business days for your refill to be completed.          Additional Information About Your Visit        Live [employer] Go Information     [employer] gives you secure access to your electronic health record. If you see a primary care provider, you can also send messages to your care team and make appointments. If you have questions, please call your primary care clinic.  If you do not have a primary care provider, please call [phone] and they will assist you.        Care EveryWhere ID     This is your Care EveryWhere ID. This could be used by  "other organizations to access your [facility] medical records  [id number]        Your Vitals Were     Pulse Temperature Height Pulse Oximetry Breastfeeding? BMI (Body Mass Index)    91 98.7  F (37.1  C) (Oral) 5' 4.5\" (1.638 m) 98% No 19.1 kg/m2       Blood Pressure from Last 3 Encounters:   01/24/18 96/61   04/20/17 102/68   12/05/16 92/70    Weight from Last 3 Encounters:   01/24/18 113 lb (51.3 kg)   04/20/17 111 lb 12.8 oz (50.7 kg)   12/05/16 115 lb (52.2 kg)              We Performed the Following     Influenza A/B antigen          Today's Medication Changes          These changes are accurate as of 1/24/18 11:01 AM.  If you have any questions, ask your nurse or doctor.               Start taking these medicines.        Dose/Directions    oseltamivir 75 MG capsule   Commonly known as:  TAMIFLU   Used for:  Acute URI, Cough, Fever, unspecified fever cause   Started by:  Rick Arroyo MD        Dose:  75 mg   Take 1 capsule (75 mg) by mouth 2 times daily   Quantity:  10 capsule   Refills:  0            Where to get your medicines      These medications were sent to [facility] [place] [address]     Phone:  [phone]     oseltamivir 75 MG capsule                Primary Care Provider Office Phone # Fax #    Giselle Mcdonnell [phone]       [address] [place], [place] [address]        Equal Access to Services     ASIF BERMEO AH: Marc waddello Somagdi, waaxda luqadaha, qaybta kaalmada praveen, norma simmons. So [facility] [phone].    ATENCIÓN: Si habla español, tiene a arreola disposición servicios gratuitos de asistencia lingüística. Llame al [phone].    We comply with applicable federal civil rights laws and [place] laws. We do not discriminate on the basis of race, color, national origin, age, disability, sex, sexual orientation, or gender " identity.            Thank you!     Thank you for choosing [facility]  for your care. Our goal is always to provide you with excellent care. Hearing back from our patients is one way we can continue to improve our services. Please take a few minutes to complete the written survey that you may receive in the mail after your visit with us. Thank you!             Your Updated Medication List - Protect others around you: Learn how to safely use, store and throw away your medicines at www.disposemymeds.org.          This list is accurate as of 1/24/18 11:01 AM.  Always use your most recent med list.                   Brand Name Dispense Instructions for use Diagnosis    albuterol 108 (90 BASE) MCG/ACT Inhaler    PROAIR HFA/PROVENTIL HFA/VENTOLIN HFA    1 Inhaler    Inhale 2 puffs into the lungs every 6 hours as needed for shortness of breath / dyspnea or wheezing    Pneumonia of left lower lobe due to infectious organism (H)       calcium carbonate 1250 MG tablet    OS-LAUREN 500 mg [place]. Ca    180 tablet    Take by mouth 2 times daily        cholecalciferol 1000 UNIT tablet    vitamin D3    100 tablet    Take 1 tablet (1,000 Units) by mouth daily        fluocinonide 0.05 % ointment    LIDEX      Acute URI, Cough, Fever, unspecified fever cause       oseltamivir 75 MG capsule    TAMIFLU    10 capsule    Take 1 capsule (75 mg) by mouth 2 times daily    Acute URI, Cough, Fever, unspecified fever cause       PROBIOTIC PEARLS Caps      Take by mouth daily        sertraline 100 MG tablet    ZOLOFT    90 tablet    TAKE ONE TABLET BY MOUTH ONE TIME DAILY    Anxiety state
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
## 2018-01-24 NOTE — PATIENT INSTRUCTIONS
Tamiflu, as directed twice daily for the next 5 days    Use OTC Mucinex DM and warm showers to help with congestion    Push fluids and rest    Follow up if symptoms worsen or don't improve       [facility]                        To reach your care team during and after hours:   [phone]  To reach our pharmacy:        [phone]    Clinic Hours                        Our clinic hours are:    Monday   7:30 am to 7:00 pm                  Tuesday through Friday 7:30 am to 5:00 pm                             Saturday   8:00 am to 12:00 pm      Sunday   Closed      Pharmacy Hours                        Our pharmacy hours are:    Monday   8:30 am to 7:00 pm       Tuesday to Friday  8:30 am to 6:00 pm                       Saturday    9:00 am to 1:00 pm              Sunday    Closed              There is also information available at our web site:  [email or web address]    If your provider ordered any lab tests and you do not receive the results within 10 business days, please call the clinic.    If you need a medication refill please contact your pharmacy.  Please allow 2-3 business days for your refill to be completed.    Our clinic offers telephone visits and e visits.  Please ask one of your team members to explain more.      Use [employer] (secure email communication and access to your chart) to send your primary care provider a message or make an appointment. Ask someone on your Team how to sign up for [employer].  Immunizations                      Immunization History   Administered Date(s) Administered     HepB 04/22/2011     Influenza (IIV3) PF 09/18/2013     Influenza Vaccine IM 3yrs+ 4 Valent IIV4 12/05/2016, 11/14/2017     Influenza Vaccine, 3 YRS +, IM (QUADRIVALENT W/PRESERVATIVES) 12/19/2014     Mantoux Tuberculin Skin Test 04/20/2011     TDAP Vaccine (Adacel) 04/20/2011        Health Maintenance                         Health Maintenance Due   Topic Date Due     Wellness Visit with your  Primary Provider - yearly  12/05/2017

## 2018-01-29 ENCOUNTER — OFFICE VISIT (OUTPATIENT)
Dept: FAMILY MEDICINE | Facility: CLINIC | Age: 48
End: 2018-01-29
Payer: COMMERCIAL

## 2018-01-29 ENCOUNTER — RADIANT APPOINTMENT (OUTPATIENT)
Dept: GENERAL RADIOLOGY | Facility: CLINIC | Age: 48
End: 2018-01-29
Attending: PHYSICIAN ASSISTANT
Payer: COMMERCIAL

## 2018-01-29 VITALS
OXYGEN SATURATION: 96 % | WEIGHT: 113 LBS | BODY MASS INDEX: 18.83 KG/M2 | DIASTOLIC BLOOD PRESSURE: 64 MMHG | HEART RATE: 97 BPM | TEMPERATURE: 98 F | HEIGHT: 65 IN | SYSTOLIC BLOOD PRESSURE: 102 MMHG

## 2018-01-29 DIAGNOSIS — F41.1 ANXIETY STATE: ICD-10-CM

## 2018-01-29 DIAGNOSIS — R05.9 COUGH: ICD-10-CM

## 2018-01-29 DIAGNOSIS — R05.9 COUGH: Primary | ICD-10-CM

## 2018-01-29 PROCEDURE — 99213 OFFICE O/P EST LOW 20 MIN: CPT | Performed by: PHYSICIAN ASSISTANT

## 2018-01-29 PROCEDURE — 71046 X-RAY EXAM CHEST 2 VIEWS: CPT | Mod: FY

## 2018-01-29 RX ORDER — BENZONATATE 100 MG/1
100 CAPSULE ORAL 3 TIMES DAILY PRN
Qty: 30 CAPSULE | Refills: 0 | Status: SHIPPED | OUTPATIENT
Start: 2018-01-29 | End: 2018-05-10

## 2018-01-29 NOTE — PATIENT INSTRUCTIONS
Can take take the tessalon pills up to three times a day to help with the cough.     Please followup if you are not getting better.      Please seek more immediate care if symptoms change or worsen in any way.

## 2018-01-29 NOTE — PROGRESS NOTES
"  SUBJECTIVE:                                                    Jono Templeton is a 47 year old female who presents to clinic today for the following health issues:    The patient was seen in clinic on 1/24/18 and diagnosed with an acute URI. Although influenza swab was negative, she was treated with Tamiflu given symptoms of fever, body aches, and cough. She completed the Tamiflu this AM. The body aches and fever resolved but the patient continues to have a cough. The cough is worse at night and productive with yellow sputum. She's been taking Robitussin with some relief. The patient denies any sore throat, ear pain, or shortness of breath.    Of note, the patient was diagnosed with pneumonia two years ago and feels like her current cough is similar to that. She is requesting a chest x-ray to rule out pneumonia.     Problem list and histories reviewed & adjusted, as indicated.  Additional history: as documented    ROS:  Constitutional, HEENT, cardiovascular, pulmonary, GI, , musculoskeletal, neuro, skin, endocrine and psych systems are negative, except as otherwise noted.    OBJECTIVE:                                                    /64 (BP Location: Right arm, Patient Position: Chair, Cuff Size: Adult Regular)  Pulse 97  Temp 98  F (36.7  C) (Oral)  Ht 5' 4.5\" (1.638 m)  Wt 113 lb (51.3 kg)  SpO2 96%  Breastfeeding? No  BMI 19.1 kg/m2  Body mass index is 19.1 kg/(m^2).  GENERAL: healthy, alert and no distress  HENT: ear canals and TM's normal, nose and mouth without ulcers or lesions  NECK: no adenopathy, no asymmetry, masses, or scars and thyroid normal to palpation  RESP: lungs clear to auscultation - no rales, rhonchi or wheezes  CV: regular rate and rhythm, normal S1 S2, no S3 or S4, no murmur, click or rub, no peripheral edema and peripheral pulses strong  PSYCH: mentation appears normal, affect normal/bright    Diagnostic Test Results:  Chest Xray - Negative (pending radiologist read) "     ASSESSMENT/PLAN:                                                      Jono was seen today for cough.    Diagnoses and all orders for this visit:    Cough  Patient likely has post-viral cough. History and exam not concerning for pneumonia, but per patient's request, chest XR was done today, which was normal. Recommended Tessalon pearls for her cough. Discussed with patient that a post-infectious cough can last up to 8-10 weeks. Advised her to follow-up if becomes short of breath or febrile.   -     XR Chest 2 Views; Future  -     benzonatate (TESSALON) 100 MG capsule; Take 1 capsule (100 mg) by mouth 3 times daily as needed for cough    See Patient Instructions    - I have discussed the patient's diagnosis, and my plan of treatment with the patient and/or family. Patient is aware to followup if symptoms do not improve.  Patient has been advised to be seen sooner or seek more immediate care if symptoms change or worsen.  Patient agrees with and understands the plan today.     Roberta HWANG acted as a student for me today and accurately reflected my words and actions.    I agree with above History, Review of Systems, Physical exam and Plan.  I have reviewed the content of the documentation and have edited it as needed. I have personally performed the services documented here and the documentation accurately represents those services and the decisions I have made.            Mirna Stoddard PA-C    [facility]

## 2018-01-29 NOTE — NURSING NOTE
"Chief Complaint   Patient presents with     Cough       Initial /64 (BP Location: Right arm, Patient Position: Chair, Cuff Size: Adult Regular)  Pulse 97  Temp 98  F (36.7  C) (Oral)  Ht 5' 4.5\" (1.638 m)  Wt 113 lb (51.3 kg)  SpO2 96%  Breastfeeding? No  BMI 19.1 kg/m2 Estimated body mass index is 19.1 kg/(m^2) as calculated from the following:    Height as of this encounter: 5' 4.5\" (1.638 m).    Weight as of this encounter: 113 lb (51.3 kg).  Medication Reconciliation: complete   Csaba Mlnarik CMA    "

## 2018-01-29 NOTE — MR AVS SNAPSHOT
After Visit Summary   1/29/2018    Jono Templeton    MRN: [id number]           Patient Information     Date Of Birth          1970        Visit Information        Provider Department      1/29/2018 2:00 PM Mirna Stoddard PA-C [facility]        Today's Diagnoses     Cough    -  1      Care Instructions    Can take take the tessalon pills up to three times a day to help with the cough.     Please followup if you are not getting better.      Please seek more immediate care if symptoms change or worsen in any way.            Follow-ups after your visit        Your next 10 appointments already scheduled     Feb 02, 2018  2:00 PM CST   PHYSICAL with Giselle Mcdonnell MD   [facility] ([facility])    [address] [place] [id number] [phone]              Who to contact     If you have questions or need follow up information about today's clinic visit or your schedule please contact [facility] directly at [phone].  Normal or non-critical lab and imaging results will be communicated to you by [employer], letter or phone within 4 business days after the clinic has received the results. If you do not hear from us within 7 days, please contact the clinic through [employer] or phone. If you have a critical or abnormal lab result, we will notify you by phone as soon as possible.  Submit refill requests through [employer] or call your pharmacy and they will forward the refill request to us. Please allow 3 business days for your refill to be completed.          Additional Information About Your Visit        [employer] Information     [employer] gives you secure access to your electronic health record. If you see a primary care provider, you can also send messages to your care team and make appointments. If you have questions, please call your primary care clinic.  If you do not have a primary care provider, please call  "[phone] and they will assist you.        Care EveryWhere ID     This is your Care EveryWhere ID. This could be used by other organizations to access your [facility] medical records  [id number]        Your Vitals Were     Pulse Temperature Height Pulse Oximetry Breastfeeding? BMI (Body Mass Index)    97 98  F (36.7  C) (Oral) 5' 4.5\" (1.638 m) 96% No 19.1 kg/m2       Blood Pressure from Last 3 Encounters:   01/29/18 102/64   01/24/18 96/61   04/20/17 102/68    Weight from Last 3 Encounters:   01/29/18 113 lb (51.3 kg)   01/24/18 113 lb (51.3 kg)   04/20/17 111 lb 12.8 oz (50.7 kg)                 Today's Medication Changes          These changes are accurate as of 1/29/18  2:57 PM.  If you have any questions, ask your nurse or doctor.               Start taking these medicines.        Dose/Directions    benzonatate 100 MG capsule   Commonly known as:  TESSALON   Used for:  Cough   Started by:  Mirna Stoddard PA-C        Dose:  100 mg   Take 1 capsule (100 mg) by mouth 3 times daily as needed for cough   Quantity:  30 capsule   Refills:  0            Where to get your medicines      These medications were sent to [facility] [place] [address]     Phone:  [phone]     benzonatate 100 MG capsule                Primary Care Provider Office Phone # Fax #    Giselle Mcdonnell [phone]       [address] [place] [address]        Equal Access to Services     [facility]: Hadjosefina Nevarez, wakarie lujonathan, qaybta kaalnorma mendoza. So [facility] [phone].    ATENCIÓN: Si habla español, tiene a arreola disposición servicios gratuitos de asistencia lingüística. Llame al [phone].    We comply with applicable federal civil rights laws and [place] laws. We do not discriminate on the basis of race, color, national origin, age, " disability, sex, sexual orientation, or gender identity.            Thank you!     Thank you for choosing [facility]  for your care. Our goal is always to provide you with excellent care. Hearing back from our patients is one way we can continue to improve our services. Please take a few minutes to complete the written survey that you may receive in the mail after your visit with us. Thank you!             Your Updated Medication List - Protect others around you: Learn how to safely use, store and throw away your medicines at www.disposemymeds.org.          This list is accurate as of 1/29/18  2:57 PM.  Always use your most recent med list.                   Brand Name Dispense Instructions for use Diagnosis    albuterol 108 (90 BASE) MCG/ACT Inhaler    PROAIR HFA/PROVENTIL HFA/VENTOLIN HFA    1 Inhaler    Inhale 2 puffs into the lungs every 6 hours as needed for shortness of breath / dyspnea or wheezing    Pneumonia of left lower lobe due to infectious organism (H)       benzonatate 100 MG capsule    TESSALON    30 capsule    Take 1 capsule (100 mg) by mouth 3 times daily as needed for cough    Cough       calcium carbonate 1250 MG tablet    OS-LAUREN 500 mg [place]. [place]    180 tablet    Take by mouth 2 times daily        cholecalciferol 1000 UNIT tablet    vitamin D3    100 tablet    Take 1 tablet (1,000 Units) by mouth daily        fluocinonide 0.05 % ointment    LIDEX      Acute URI, Cough, Fever, unspecified fever cause       PROBIOTIC PEARLS Caps      Take by mouth daily        sertraline 100 MG tablet    ZOLOFT    90 tablet    TAKE ONE TABLET BY MOUTH ONE TIME DAILY    Anxiety state

## 2018-01-30 NOTE — TELEPHONE ENCOUNTER
"Requested Prescriptions   Pending Prescriptions Disp Refills     sertraline (ZOLOFT) 100 MG tablet 90 tablet 1    Last Written Prescription Date:  6.27.17  Last Fill Quantity: 90,  # refills: 1   Last Office Visit with [facility] provider:  1.29.18   Future Office Visit:    Next 5 appointments (look out 90 days)     Feb 02, 2018  2:00 PM CST   PHYSICAL with Giselle Mcdonnell MD   [facility]    [address] [place] [id number] [phone]                 PHQ-9 SCORE 12/19/2014   Total Score 0      Sig: Take 1 tablet (100 mg) by mouth daily    SSRIs Protocol Passed    1/29/2018  3:42 PM       Passed - Recent or future visit with authorizing provider    Patient had office visit in the last year or has a visit in the next 30 days with authorizing provider.  See \"Patient Info\" tab in inbasket, or \"Choose Columns\" in Meds & Orders section of the refill encounter.            Passed - Patient is age 18 or older       Passed - No active pregnancy on record       Passed - No positive pregnancy test in last 12 months          "

## 2018-02-01 RX ORDER — SERTRALINE HYDROCHLORIDE 100 MG/1
100 TABLET, FILM COATED ORAL DAILY
Qty: 90 TABLET | Refills: 0 | Status: SHIPPED | OUTPATIENT
Start: 2018-02-01 | End: 2018-05-09

## 2018-02-01 NOTE — TELEPHONE ENCOUNTER
Med for anxiety. Past protocol notes PHQ-9 not needed.    Prescription approved per [facility] Refill Protocol.  Gale Hogan RN  [facility]

## 2018-02-02 ENCOUNTER — OFFICE VISIT (OUTPATIENT)
Dept: FAMILY MEDICINE | Facility: CLINIC | Age: 48
End: 2018-02-02
Payer: COMMERCIAL

## 2018-02-02 ENCOUNTER — RADIANT APPOINTMENT (OUTPATIENT)
Dept: GENERAL RADIOLOGY | Facility: CLINIC | Age: 48
End: 2018-02-02
Attending: FAMILY MEDICINE
Payer: COMMERCIAL

## 2018-02-02 VITALS
BODY MASS INDEX: 19.22 KG/M2 | OXYGEN SATURATION: 96 % | HEART RATE: 94 BPM | TEMPERATURE: 98.2 F | SYSTOLIC BLOOD PRESSURE: 104 MMHG | DIASTOLIC BLOOD PRESSURE: 70 MMHG | WEIGHT: 112.6 LBS | HEIGHT: 64 IN

## 2018-02-02 DIAGNOSIS — R07.89 CHEST DISCOMFORT: ICD-10-CM

## 2018-02-02 DIAGNOSIS — N92.1 METRORRHAGIA: ICD-10-CM

## 2018-02-02 DIAGNOSIS — N81.2 FIRST DEGREE UTERINE PROLAPSE: ICD-10-CM

## 2018-02-02 DIAGNOSIS — R93.89 ENDOMETRIAL THICKENING ON ULTRA SOUND: ICD-10-CM

## 2018-02-02 DIAGNOSIS — D25.1 INTRAMURAL LEIOMYOMA OF UTERUS: ICD-10-CM

## 2018-02-02 DIAGNOSIS — N92.0 MENORRHAGIA WITH REGULAR CYCLE: ICD-10-CM

## 2018-02-02 DIAGNOSIS — Z00.01 ENCOUNTER FOR ROUTINE ADULT MEDICAL EXAM WITH ABNORMAL FINDINGS: Primary | ICD-10-CM

## 2018-02-02 DIAGNOSIS — R05.9 COUGH: ICD-10-CM

## 2018-02-02 DIAGNOSIS — Z12.4 SCREENING FOR MALIGNANT NEOPLASM OF CERVIX: ICD-10-CM

## 2018-02-02 DIAGNOSIS — Z13.6 CARDIOVASCULAR SCREENING; LDL GOAL LESS THAN 160: ICD-10-CM

## 2018-02-02 LAB
BASOPHILS # BLD AUTO: 0 10E9/L (ref 0–0.2)
BASOPHILS NFR BLD AUTO: 0.4 %
DIFFERENTIAL METHOD BLD: NORMAL
EOSINOPHIL # BLD AUTO: 0.3 10E9/L (ref 0–0.7)
EOSINOPHIL NFR BLD AUTO: 3.6 %
ERYTHROCYTE [DISTWIDTH] IN BLOOD BY AUTOMATED COUNT: 11.8 % (ref 10–15)
HCT VFR BLD AUTO: 41.5 % (ref 35–47)
HGB BLD-MCNC: 14.3 G/DL (ref 11.7–15.7)
LYMPHOCYTES # BLD AUTO: 1.7 10E9/L (ref 0.8–5.3)
LYMPHOCYTES NFR BLD AUTO: 24.8 %
MCH RBC QN AUTO: 30.7 PG (ref 26.5–33)
MCHC RBC AUTO-ENTMCNC: 34.5 G/DL (ref 31.5–36.5)
MCV RBC AUTO: 89 FL (ref 78–100)
MONOCYTES # BLD AUTO: 0.6 10E9/L (ref 0–1.3)
MONOCYTES NFR BLD AUTO: 8.6 %
NEUTROPHILS # BLD AUTO: 4.3 10E9/L (ref 1.6–8.3)
NEUTROPHILS NFR BLD AUTO: 62.6 %
PLATELET # BLD AUTO: 349 10E9/L (ref 150–450)
RBC # BLD AUTO: 4.66 10E12/L (ref 3.8–5.2)
WBC # BLD AUTO: 6.9 10E9/L (ref 4–11)

## 2018-02-02 PROCEDURE — 80050 GENERAL HEALTH PANEL: CPT | Performed by: FAMILY MEDICINE

## 2018-02-02 PROCEDURE — 99214 OFFICE O/P EST MOD 30 MIN: CPT | Mod: 25 | Performed by: FAMILY MEDICINE

## 2018-02-02 PROCEDURE — 87591 N.GONORRHOEAE DNA AMP PROB: CPT | Performed by: FAMILY MEDICINE

## 2018-02-02 PROCEDURE — 99396 PREV VISIT EST AGE 40-64: CPT | Performed by: FAMILY MEDICINE

## 2018-02-02 PROCEDURE — 87801 DETECT AGNT MULT DNA AMPLI: CPT | Performed by: FAMILY MEDICINE

## 2018-02-02 PROCEDURE — 71046 X-RAY EXAM CHEST 2 VIEWS: CPT | Mod: FY

## 2018-02-02 PROCEDURE — 87624 HPV HI-RISK TYP POOLED RSLT: CPT | Performed by: FAMILY MEDICINE

## 2018-02-02 PROCEDURE — 87491 CHLMYD TRACH DNA AMP PROBE: CPT | Performed by: FAMILY MEDICINE

## 2018-02-02 PROCEDURE — G0145 SCR C/V CYTO,THINLAYER,RESCR: HCPCS | Performed by: FAMILY MEDICINE

## 2018-02-02 PROCEDURE — 36415 COLL VENOUS BLD VENIPUNCTURE: CPT | Performed by: FAMILY MEDICINE

## 2018-02-02 PROCEDURE — 93000 ELECTROCARDIOGRAM COMPLETE: CPT | Performed by: FAMILY MEDICINE

## 2018-02-02 RX ORDER — AZITHROMYCIN 250 MG/1
TABLET, FILM COATED ORAL
Qty: 6 TABLET | Refills: 0 | Status: SHIPPED | OUTPATIENT
Start: 2018-02-02 | End: 2018-05-10

## 2018-02-02 RX ORDER — CODEINE PHOSPHATE AND GUAIFENESIN 10; 100 MG/5ML; MG/5ML
1 SOLUTION ORAL EVERY 4 HOURS PRN
Qty: 120 ML | Refills: 0 | Status: SHIPPED | OUTPATIENT
Start: 2018-02-02 | End: 2018-05-10

## 2018-02-02 ASSESSMENT — PATIENT HEALTH QUESTIONNAIRE - PHQ9: 5. POOR APPETITE OR OVEREATING: NOT AT ALL

## 2018-02-02 ASSESSMENT — ANXIETY QUESTIONNAIRES
6. BECOMING EASILY ANNOYED OR IRRITABLE: NOT AT ALL
7. FEELING AFRAID AS IF SOMETHING AWFUL MIGHT HAPPEN: NOT AT ALL
3. WORRYING TOO MUCH ABOUT DIFFERENT THINGS: NOT AT ALL
2. NOT BEING ABLE TO STOP OR CONTROL WORRYING: NOT AT ALL
GAD7 TOTAL SCORE: 0
1. FEELING NERVOUS, ANXIOUS, OR ON EDGE: NOT AT ALL
5. BEING SO RESTLESS THAT IT IS HARD TO SIT STILL: NOT AT ALL

## 2018-02-02 NOTE — NURSING NOTE
"Chief Complaint   Patient presents with     Physical       Initial /70 (BP Location: Left arm, Patient Position: Chair, Cuff Size: Adult Regular)  Pulse 94  Temp 98.2  F (36.8  C) (Oral)  Ht 5' 4.25\" (1.632 m)  Wt 112 lb 9.6 oz (51.1 kg)  LMP 01/19/2018 (Exact Date)  SpO2 96%  BMI 19.18 kg/m2 Estimated body mass index is 19.18 kg/(m^2) as calculated from the following:    Height as of this encounter: 5' 4.25\" (1.632 m).    Weight as of this encounter: 112 lb 9.6 oz (51.1 kg).  Medication Reconciliation: complete   Maddison Rojas CMA  "

## 2018-02-02 NOTE — PATIENT INSTRUCTIONS
Ok to hold tessalon perles as they don't seem to be working.   Start robitussin maximum strength or Delsym cough syrup over the counter during the day for your cough.   Ok for robitussin with codeine syrup at night - don't overlap them within 4 hours.      Preventive Health Recommendations  Female Ages 40 to 49    Yearly exam:     See your health care provider every year in order to  1. Review health changes.   2. Discuss preventive care.    3. Review your medicines if your doctor prescribed any.      Get a Pap test every three years (unless you have an abnormal result and your provider advises testing more often).      If you get Pap tests with HPV test, you only need to test every 5 years, unless you have an abnormal result. You do not need a Pap test if your uterus was removed (hysterectomy) and you have not had cancer.      You should be tested each year for STDs (sexually transmitted diseases), if you're at risk.       Ask your doctor if you should have a mammogram.      Have a colonoscopy (test for colon cancer) if someone in your family has had colon cancer or polyps before age 50.       Have a cholesterol test every 5 years.       Have a diabetes test (fasting glucose) after age 45. If you are at risk for diabetes, you should have this test every 3 years.    Shots: Get a flu shot each year. Get a tetanus shot every 10 years.     Nutrition:     Eat at least 5 servings of fruits and vegetables each day.    Eat whole-grain bread, whole-wheat pasta and brown rice instead of white grains and rice.    Talk to your provider about Calcium and Vitamin D.     Lifestyle    Exercise at least 150 minutes a week (an average of 30 minutes a day, 5 days a week). This will help you control your weight and prevent disease.    Limit alcohol to one drink per day.    No smoking.     Wear sunscreen to prevent skin cancer.    See your dentist every six months for an exam and cleaning.               Thank you for choosing [facility]  for your Health Care. It was a pleasure seeing you at your visit today. Please contact us with any questions or concerns you may have.                   Giselle Mcdonnell MD                                  To reach your [facility] care team after hours call:   [phone]    Our clinic hours are:     Monday- 7:30 am - 7:00 pm                             Tuesday through Friday- 7:30 am - 5:00 pm                                        Saturday- 8:00 am - 12:00 pm                  Phone:  [phone]    Our pharmacy hours are:     Monday  8:00 am to 7:00 pm      Tuesday through Friday 8:00am to 6:00pm                        Saturday - 9:00 am to 1:00 pm      Sunday : Closed.              Phone:  [phone]      There is also information available at our web site:  [email or web address]    If your provider ordered any lab tests and you do not receive the results within 10 business days, please call the clinic.    If you need a medication refill please contact your pharmacy.  Please allow 2 business days for your refill to be completed.    Our clinic offers telephone visits and e visits.  Please ask one of your team members to explain more.      Use [employer] (secure email communication and access to your chart) to send your primary care provider a message or make an appointment. Ask someone on your Team how to sign up for [employer].             Whooping Cough (Pertussis) (Adult)   Whooping cough (also called pertussis) is a bacterial infection in the respiratory tract. It is very serious and can lead to severe illness including death when it occurs in infants under one year of age or the elderly; however, in children and adults it usually causes only a mild illness.   Pertussis is highly contagious and is spread through the air by coughing or sneezing. Bordetella pertussus, the pertussis bacteria, is then breathed in by others who are in close contact with the infected person.  "The illness starts like an ordinary cold with mild cough, congestion and low grade fever. However, the coughing becomes very severe after the first two weeks of illness and may lead to exhaustion and poor appetite in younger children. There may be very thick mucus in the nose and throat that makes breathing difficult. Coughing spells may be prolonged and followed by a loud \"whooping\" sound when breathing in. Fatigue (feeling tired), gagging, and vomiting may also occur after a coughing spell. Rarely, in the most severe cases, the air passage can become blocked making it impossible to breathe.   Antibiotics are used to treat this illness but after treatment it may take up to three months for the cough to disappear completely.   Vaccination of children prevents pertussis, however the vaccine wears off after 5-10 years. This is why teens and adults who were vaccinated as a child can get a mild form of pertussis. These teens and adults can spread the illness to unvaccinated infants and children. Be sure to ask your healthcare provider whether you or your teen needs a booster vaccination.   Home Care   1. Stay home from work or school until you have completed at least five days of antibiotic treatment or until three weeks or 21 days after the onset of cough, if appropriate antibiotic treatment is not taken. If symptoms are severe, rest at home for the first 2-3 days. When resuming activity, do not let yourself become overly tired.  2. Avoid exposure to cigarette smoke (yours or others).  3. Before taking medication for fever, pain, and coughing, please consult with your doctor.  4. Your appetite may be poor so a light diet is fine. Avoid dehydration by drinking 6-8 glasses of fluids (water, sport drinks, juices, tea, soup, etc.). Extra fluids will help loosen secretions in the nose and lungs.  Follow-uup care   with your doctor or as advised if you are not improving over the next week.   [NOTE: A radiologist will review " any X-rays that were taken. We will notify you of any new findings that may affect your care.]   When to seek medical advice   Call your health care provider right away if any of these occur:   Shortness of breath, noisy breathing, difficulty or pain with breathing   Fast breathing (6-10 yrs: over 30 breaths/min, more than 10 yrs old: over 25 breaths/min)   Cough becomes more severe, with gagging, vomiting or turning blue during the cough   Cough with lots of colored sputum (mucus) or blood in your sputum   Severe headache; face, neck, or ear pain   Fever over 100.4 F (38.0 C) for more than three days    0468-7848 [employer]. [address] [place] [address], [place] [id number]. All rights reserved. This information is not intended as a substitute for professional medical care. Always follow your healthcare professional's instructions.                          Cough  What is coughing?   Coughing is a sudden forcing of air from the lungs. It is a natural reflex to clear the air passages. It can also be a symptom of a disease or other medical problem.   Some coughs are dry and hacking. Some coughs are deeper, even painful at times. Some coughs bring up mucus or phlegm. Healthcare providers call these coughs productive coughs. Coughs that bring mucus up out of your airways can make it easier to breathe. For example, if you have pneumonia, coughing is helpful because it clears the airway of mucus. This relieves chest congestion and makes it easier to breathe.   How does it occur?   Coughing often occurs when the airways are irritated. It can be caused by:   a cold or flu   sinus infection   bronchitis   allergies   heartburn (reflux)   asthma.   It may also be caused by more serious illnesses such as:   heart failure   pneumonia   tuberculosis   cancer.   Some drugs may cause coughing as a side effect. Examples of such drugs are ACE inhibitors and beta blockers, which are drugs used to treat high blood pressure.    Sometimes people just have a nervous habit of coughing or throat clearing.   Any cough that lasts several weeks or more is chronic. This is true even if it occurs only in the morning, only at night, or only in the winter. One common cause of a chronic cough is exposure to irritants such as smoke or pollen. Some people with a chronic cough get so used to coughing that they consider it normal. This is often true of the smoker's cough that many smokers come to accept as a part of waking up in the morning. The problem may be more serious than they think.   How is it treated?   Many different medicines for coughs are available without a prescription. If you need relief from a dry, hacking cough, choose a type of cough medicine called a cough suppressant. If you need to loosen mucus, choose an expectorant.   Cough suppressants are medicines that lessen the urge to cough. If you have a dry, hacking cough and do not have mucus in your airways that needs to be coughed up, a cough suppressant may help you cough less and sleep better. Cough medicines with the initials DM in the name contain the suppressant drug called dextromethorphan.   Expectorants may help keep the mucus thin and bring up mucus from the lungs when you cough. This can relieve chest congestion and make it easier to breathe. The drug used most often as an expectorant is guaifenesin.   How can I take care of myself?   If you smoke, stop. If someone else in your household smokes, ask them to smoke outside. Avoid exposure to secondhand smoke.   Take cough medicine if recommended by your healthcare provider. Always follow the instructions on the label of cough medicines.   If you have a wet-sounding cough, do not use medicines that contain antihistamines. Antihistamines dry up the mucus.   Unless your healthcare provider has told you differently, drink plenty of liquids to help loosen mucus and make it easier to cough it up. It can also help to drink warm  liquids such as soup or hot apple juice.   If the air in your bedroom is dry, a cool-mist humidifier can moisten the air and help make breathing easier. Be sure to follow the 's instructions for cleaning the humidifier often so that bacteria and mold cannot grow. You can also try running hot water in the shower or bathtub to steam up the bathroom. Sit in there for 10 to 15 minutes if you are coughing hard or having trouble breathing.   Get plenty of rest.   Call your healthcare provider or 911 right away if you have a cough that causes shortness of breath or severe pain, or if you begin coughing up blood.   Call your healthcare provider right away if you have trouble breathing.   Call your provider during office hours if you have:   a cough with fever higher than 101.5?F (38.6?C)   phlegm that looks greenish or has streaks of blood in it   a cough that interferes with your sleep or daily activities   a cough that has not gotten better in 7 days   a violent cough that comes on suddenly   a high-pitched sound when you breathe in   unexpected weight loss as well as a cough.     Published by [employer].  This content is reviewed periodically and is subject to change as new health information becomes available. The information is intended to inform and educate and is not a replacement for medical evaluation, advice, diagnosis or treatment by a healthcare professional.   Developed by [employer].   ? 2010 [employer] and/or its affiliates. All Rights Reserved.   Copyright   [employer] 2011
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
## 2018-02-02 NOTE — MR AVS SNAPSHOT
After Visit Summary   2/2/2018    Jono Templeton    MRN: [id number]           Patient Information     Date Of Birth          1970        Visit Information        Provider Department      2/2/2018 2:00 PM Giselle Mcdonnell MD [facility]        Today's Diagnoses     Encounter for routine adult medical exam with abnormal findings    -  1    Cough        Chest discomfort        Menorrhagia with regular cycle        Intramural leiomyoma of uterus- 0.9cm in 10/2013        Endometrial thickening on ultra sound- 14mm on 1/3/2017 US - pt didn't go for f/u at that time        Metrorrhagia- spotting between menses        CARDIOVASCULAR SCREENING; LDL GOAL LESS THAN 160        Screening for malignant neoplasm of cervix        First degree uterine prolapse          Care Instructions    Ok to hold tessalon perles as they don't seem to be working.   Start robitussin maximum strength or Delsym cough syrup over the counter during the day for your cough.   Ok for robitussin with codeine syrup at night - don't overlap them within 4 hours.      Preventive Health Recommendations  Female Ages 40 to 49    Yearly exam:     See your health care provider every year in order to  1. Review health changes.   2. Discuss preventive care.    3. Review your medicines if your doctor prescribed any.      Get a Pap test every three years (unless you have an abnormal result and your provider advises testing more often).      If you get Pap tests with HPV test, you only need to test every 5 years, unless you have an abnormal result. You do not need a Pap test if your uterus was removed (hysterectomy) and you have not had cancer.      You should be tested each year for STDs (sexually transmitted diseases), if you're at risk.       Ask your doctor if you should have a mammogram.      Have a colonoscopy (test for colon cancer) if someone in your family has had colon cancer or polyps before age 50.       Have a  cholesterol test every 5 years.       Have a diabetes test (fasting glucose) after age 45. If you are at risk for diabetes, you should have this test every 3 years.    Shots: Get a flu shot each year. Get a tetanus shot every 10 years.     Nutrition:     Eat at least 5 servings of fruits and vegetables each day.    Eat whole-grain bread, whole-wheat pasta and brown rice instead of white grains and rice.    Talk to your provider about Calcium and Vitamin D.     Lifestyle    Exercise at least 150 minutes a week (an average of 30 minutes a day, 5 days a week). This will help you control your weight and prevent disease.    Limit alcohol to one drink per day.    No smoking.     Wear sunscreen to prevent skin cancer.    See your dentist every six months for an exam and cleaning.               Thank you for choosing [facility]  for your Health Care. It was a pleasure seeing you at your visit today. Please contact us with any questions or concerns you may have.                   Giselle Mcdonnell MD                                  To reach your [facility] care team after hours call:   [phone]    Our clinic hours are:     Monday- 7:30 am - 7:00 pm                             Tuesday through Friday- 7:30 am - 5:00 pm                                        Saturday- 8:00 am - 12:00 pm                  Phone:  [phone]    Our pharmacy hours are:     Monday  8:00 am to 7:00 pm      Tuesday through Friday 8:00am to 6:00pm                        Saturday - 9:00 am to 1:00 pm      Sunday : Closed.              Phone:  [phone]      There is also information available at our web site:  [email or web address]    If your provider ordered any lab tests and you do not receive the results within 10 business days, please call the clinic.    If you need a medication refill please contact your pharmacy.  Please allow 2 business days for your refill to be completed.    Our clinic offers  "telephone visits and e visits.  Please ask one of your team members to explain more.      Use MyChart (secure email communication and access to your chart) to send your primary care provider a message or make an appointment. Ask someone on your Team how to sign up for [employer].             Whooping Cough (Pertussis) (Adult)   Whooping cough (also called pertussis) is a bacterial infection in the respiratory tract. It is very serious and can lead to severe illness including death when it occurs in infants under one year of age or the elderly; however, in children and adults it usually causes only a mild illness.   Pertussis is highly contagious and is spread through the air by coughing or sneezing. Bordetella pertussus, the pertussis bacteria, is then breathed in by others who are in close contact with the infected person. The illness starts like an ordinary cold with mild cough, congestion and low grade fever. However, the coughing becomes very severe after the first two weeks of illness and may lead to exhaustion and poor appetite in younger children. There may be very thick mucus in the nose and throat that makes breathing difficult. Coughing spells may be prolonged and followed by a loud \"whooping\" sound when breathing in. Fatigue (feeling tired), gagging, and vomiting may also occur after a coughing spell. Rarely, in the most severe cases, the air passage can become blocked making it impossible to breathe.   Antibiotics are used to treat this illness but after treatment it may take up to three months for the cough to disappear completely.   Vaccination of children prevents pertussis, however the vaccine wears off after 5-10 years. This is why teens and adults who were vaccinated as a child can get a mild form of pertussis. These teens and adults can spread the illness to unvaccinated infants and children. Be sure to ask your healthcare provider whether you or your teen needs a booster vaccination.   Home Care "   1. Stay home from work or school until you have completed at least five days of antibiotic treatment or until three weeks or 21 days after the onset of cough, if appropriate antibiotic treatment is not taken. If symptoms are severe, rest at home for the first 2-3 days. When resuming activity, do not let yourself become overly tired.  2. Avoid exposure to cigarette smoke (yours or others).  3. Before taking medication for fever, pain, and coughing, please consult with your doctor.  4. Your appetite may be poor so a light diet is fine. Avoid dehydration by drinking 6-8 glasses of fluids (water, sport drinks, juices, tea, soup, etc.). Extra fluids will help loosen secretions in the nose and lungs.  Follow-uup care   with your doctor or as advised if you are not improving over the next week.   [NOTE: A radiologist will review any X-rays that were taken. We will notify you of any new findings that may affect your care.]   When to seek medical advice   Call your health care provider right away if any of these occur:   Shortness of breath, noisy breathing, difficulty or pain with breathing   Fast breathing (6-10 yrs: over 30 breaths/min, more than 10 yrs old: over 25 breaths/min)   Cough becomes more severe, with gagging, vomiting or turning blue during the cough   Cough with lots of colored sputum (mucus) or blood in your sputum   Severe headache; face, neck, or ear pain   Fever over 100.4 F (38.0 C) for more than three days    6157-1976 [employer]. [address] [place] [address], [place] [address]. All rights reserved. This information is not intended as a substitute for professional medical care. Always follow your healthcare professional's instructions.                          Cough  What is coughing?   Coughing is a sudden forcing of air from the lungs. It is a natural reflex to clear the air passages. It can also be a symptom of a disease or other medical problem.   Some coughs are dry and hacking. Some  coughs are deeper, even painful at times. Some coughs bring up mucus or phlegm. Healthcare providers call these coughs productive coughs. Coughs that bring mucus up out of your airways can make it easier to breathe. For example, if you have pneumonia, coughing is helpful because it clears the airway of mucus. This relieves chest congestion and makes it easier to breathe.   How does it occur?   Coughing often occurs when the airways are irritated. It can be caused by:   a cold or flu   sinus infection   bronchitis   allergies   heartburn (reflux)   asthma.   It may also be caused by more serious illnesses such as:   heart failure   pneumonia   tuberculosis   cancer.   Some drugs may cause coughing as a side effect. Examples of such drugs are ACE inhibitors and beta blockers, which are drugs used to treat high blood pressure.   Sometimes people just have a nervous habit of coughing or throat clearing.   Any cough that lasts several weeks or more is chronic. This is true even if it occurs only in the morning, only at night, or only in the winter. One common cause of a chronic cough is exposure to irritants such as smoke or pollen. Some people with a chronic cough get so used to coughing that they consider it normal. This is often true of the smoker's cough that many smokers come to accept as a part of waking up in the morning. The problem may be more serious than they think.   How is it treated?   Many different medicines for coughs are available without a prescription. If you need relief from a dry, hacking cough, choose a type of cough medicine called a cough suppressant. If you need to loosen mucus, choose an expectorant.   Cough suppressants are medicines that lessen the urge to cough. If you have a dry, hacking cough and do not have mucus in your airways that needs to be coughed up, a cough suppressant may help you cough less and sleep better. Cough medicines with the initials DM in the name contain the  suppressant drug called dextromethorphan.   Expectorants may help keep the mucus thin and bring up mucus from the lungs when you cough. This can relieve chest congestion and make it easier to breathe. The drug used most often as an expectorant is guaifenesin.   How can I take care of myself?   If you smoke, stop. If someone else in your household smokes, ask them to smoke outside. Avoid exposure to secondhand smoke.   Take cough medicine if recommended by your healthcare provider. Always follow the instructions on the label of cough medicines.   If you have a wet-sounding cough, do not use medicines that contain antihistamines. Antihistamines dry up the mucus.   Unless your healthcare provider has told you differently, drink plenty of liquids to help loosen mucus and make it easier to cough it up. It can also help to drink warm liquids such as soup or hot apple juice.   If the air in your bedroom is dry, a cool-mist humidifier can moisten the air and help make breathing easier. Be sure to follow the 's instructions for cleaning the humidifier often so that bacteria and mold cannot grow. You can also try running hot water in the shower or bathtub to steam up the bathroom. Sit in there for 10 to 15 minutes if you are coughing hard or having trouble breathing.   Get plenty of rest.   Call your healthcare provider or 911 right away if you have a cough that causes shortness of breath or severe pain, or if you begin coughing up blood.   Call your healthcare provider right away if you have trouble breathing.   Call your provider during office hours if you have:   a cough with fever higher than 101.5?F (38.6?C)   phlegm that looks greenish or has streaks of blood in it   a cough that interferes with your sleep or daily activities   a cough that has not gotten better in 7 days   a violent cough that comes on suddenly   a high-pitched sound when you breathe in   unexpected weight loss as well as a cough.      Published by [employer].  This content is reviewed periodically and is subject to change as new health information becomes available. The information is intended to inform and educate and is not a replacement for medical evaluation, advice, diagnosis or treatment by a healthcare professional.   Developed by [employer].   ? 2010 [facility] and/or its affiliates. All Rights Reserved.   Copyright   [employer] 2011                    Follow-ups after your visit        Additional Services     OB/GYN REFERRAL       Your provider has referred you to:  [facility]: OBGYN MYA Regan [phone]   [email or web address]    Please be aware that coverage of these services is subject to the terms and limitations of your health insurance plan.  Call member services at your health plan with any benefit or coverage questions.      Please bring the following with you to your appointment:    (1) Any X-Rays, CTs or MRIs which have been performed.  Contact the facility where they were done to arrange for  prior to your scheduled appointment.  Any new CT, MRI or other procedures ordered by your specialist must be performed at a [facility] facility or coordinated by your clinic's referral office.    (2) List of current medications   (3) This referral request   (4) Any documents/labs given to you for this referral                  Follow-up notes from your care team     Return in about 1 year (around 2/2/2019), or if symptoms worsen or fail to improve, for Lab Work, Physical Exam, Routine Visit.      Future tests that were ordered for you today     Open Future Orders        Priority Expected Expires Ordered    US Pelvic Complete with Transvaginal Routine 5/3/2018 8/1/2018 2/2/2018    Lipid panel reflex to direct LDL Fasting Routine 2/10/2018 4/2/2018 2/2/2018            Who to contact     If you have questions or need follow up information about today's clinic visit or your schedule please  "contact [facility] directly at [phone].  Normal or non-critical lab and imaging results will be communicated to you by MyChart, letter or phone within 4 business days after the clinic has received the results. If you do not hear from us within 7 days, please contact the clinic through [employer] or phone. If you have a critical or abnormal lab result, we will notify you by phone as soon as possible.  Submit refill requests through [employer] or call your pharmacy and they will forward the refill request to us. Please allow 3 business days for your refill to be completed.          Additional Information About Your Visit        [employer] Information     [employer] gives you secure access to your electronic health record. If you see a primary care provider, you can also send messages to your care team and make appointments. If you have questions, please call your primary care clinic.  If you do not have a primary care provider, please call [phone] and they will assist you.        Care EveryWhere ID     This is your Care EveryWhere ID. This could be used by other organizations to access your [facility] medical records  [id number]        Your Vitals Were     Pulse Temperature Height Last Period Pulse Oximetry BMI (Body Mass Index)    94 98.2  F (36.8  C) (Oral) 5' 4.25\" (1.632 m) 01/19/2018 (Exact Date) 96% 19.18 kg/m2       Blood Pressure from Last 3 Encounters:   02/02/18 104/70   01/29/18 102/64   01/24/18 96/61    Weight from Last 3 Encounters:   02/02/18 112 lb 9.6 oz (51.1 kg)   01/29/18 113 lb (51.3 kg)   01/24/18 113 lb (51.3 kg)              We Performed the Following     Bordetella pert parapert DNA pcr     CBC with platelets differential     CHLAMYDIA TRACHOMATIS PCR     Comprehensive metabolic panel     EKG 12-lead complete w/read - Clinics     HPV High Risk Types DNA Cervical     NEISSERIA GONORRHOEA PCR     OB/GYN REFERRAL     Pap imaged thin layer screen with HPV - recommended age 30 - 65 " years (select HPV order below)     TSH with free T4 reflex          Today's Medication Changes          These changes are accurate as of 2/2/18  3:51 PM.  If you have any questions, ask your nurse or doctor.               Start taking these medicines.        Dose/Directions    azithromycin 250 MG tablet   Commonly known as:  ZITHROMAX   Used for:  Cough   Started by:  Giselle Mcdonnell MD        2 tabs first day, then 1 tab by mouth daily for 4 additional days   Quantity:  6 tablet   Refills:  0       guaiFENesin-codeine 100-10 MG/5ML Soln solution   Commonly known as:  ROBITUSSIN AC   Used for:  Cough   Started by:  Giselle Mcdonnell MD        Dose:  1 tsp.   Take 5 mLs by mouth every 4 hours as needed for cough   Quantity:  120 mL   Refills:  0         Stop taking these medicines if you haven't already. Please contact your care team if you have questions.     albuterol 108 (90 BASE) MCG/ACT Inhaler   Commonly known as:  PROAIR HFA/PROVENTIL HFA/VENTOLIN HFA   Stopped by:  Giselle Mcdonnell MD                Where to get your medicines      These medications were sent to [facility] [place] [id number]     Phone:  [phone]     azithromycin 250 MG tablet         Some of these will need a paper prescription and others can be bought over the counter.  Ask your nurse if you have questions.     Bring a paper prescription for each of these medications     guaiFENesin-codeine 100-10 MG/5ML Soln solution                Primary Care Provider Office Phone # Fax #    Giselle Mcdonnell [phone]       [address] [place], [place] [address]        Equal Access to Services     [facility] Center[facility] : Hadjosefina Nevarez, abigail bach, qaybta kaalberonica morton, norma simmons. So [facility] [phone].    ATENCIÓN: Si habla español, tiene a arreola disposición servicios  nancy de asistencia lingüística. Carmel mcfarland [phone].    We comply with applicable federal civil rights laws and [place] laws. We do not discriminate on the basis of race, color, national origin, age, disability, sex, sexual orientation, or gender identity.            Thank you!     Thank you for choosing [facility]  for your care. Our goal is always to provide you with excellent care. Hearing back from our patients is one way we can continue to improve our services. Please take a few minutes to complete the written survey that you may receive in the mail after your visit with us. Thank you!             Your Updated Medication List - Protect others around you: Learn how to safely use, store and throw away your medicines at www.disposemymeds.org.          This list is accurate as of 2/2/18  3:51 PM.  Always use your most recent med list.                   Brand Name Dispense Instructions for use Diagnosis    azithromycin 250 MG tablet    ZITHROMAX    6 tablet    2 tabs first day, then 1 tab by mouth daily for 4 additional days    Cough       benzonatate 100 MG capsule    TESSALON    30 capsule    Take 1 capsule (100 mg) by mouth 3 times daily as needed for cough    Cough       calcium carbonate 1250 MG tablet    OS-LAUREN 500 mg [place]. [place]    180 tablet    Take by mouth 2 times daily        cholecalciferol 1000 UNIT tablet    vitamin D3    100 tablet    Take 1 tablet (1,000 Units) by mouth daily        fluocinonide 0.05 % ointment    LIDEX      Acute URI, Cough, Fever, unspecified fever cause       guaiFENesin-codeine 100-10 MG/5ML Soln solution    ROBITUSSIN AC    120 mL    Take 5 mLs by mouth every 4 hours as needed for cough    Cough       PROBIOTIC PEARLS Caps      Take by mouth daily        sertraline 100 MG tablet    ZOLOFT    90 tablet    Take 1 tablet (100 mg) by mouth daily    Anxiety state

## 2018-02-03 ASSESSMENT — ANXIETY QUESTIONNAIRES: GAD7 TOTAL SCORE: 0

## 2018-02-03 ASSESSMENT — PATIENT HEALTH QUESTIONNAIRE - PHQ9: SUM OF ALL RESPONSES TO PHQ QUESTIONS 1-9: 2

## 2018-02-04 LAB
ALBUMIN SERPL-MCNC: 4 G/DL (ref 3.4–5)
ALP SERPL-CCNC: 63 U/L (ref 40–150)
ALT SERPL W P-5'-P-CCNC: 21 U/L (ref 0–50)
ANION GAP SERPL CALCULATED.3IONS-SCNC: 7 MMOL/L (ref 3–14)
AST SERPL W P-5'-P-CCNC: 22 U/L (ref 0–45)
B PERT+PARAPERT DNA PNL SPEC NAA+PROBE: NEGATIVE
BILIRUB SERPL-MCNC: 0.3 MG/DL (ref 0.2–1.3)
BUN SERPL-MCNC: 15 MG/DL (ref 7–30)
C TRACH DNA SPEC QL NAA+PROBE: NEGATIVE
CALCIUM SERPL-MCNC: 9.1 MG/DL (ref 8.5–10.1)
CHLORIDE SERPL-SCNC: 105 MMOL/L (ref 94–109)
CO2 SERPL-SCNC: 27 MMOL/L (ref 20–32)
CREAT SERPL-MCNC: 0.8 MG/DL (ref 0.52–1.04)
GFR SERPL CREATININE-BSD FRML MDRD: 77 ML/MIN/1.7M2
GLUCOSE SERPL-MCNC: 84 MG/DL (ref 70–99)
N GONORRHOEA DNA SPEC QL NAA+PROBE: NEGATIVE
POTASSIUM SERPL-SCNC: 4.2 MMOL/L (ref 3.4–5.3)
PROT SERPL-MCNC: 7.5 G/DL (ref 6.8–8.8)
SODIUM SERPL-SCNC: 139 MMOL/L (ref 133–144)
SPECIMEN SOURCE: NORMAL
TSH SERPL DL<=0.005 MIU/L-ACNC: 1.73 MU/L (ref 0.4–4)

## 2018-02-05 DIAGNOSIS — Z13.6 CARDIOVASCULAR SCREENING; LDL GOAL LESS THAN 160: ICD-10-CM

## 2018-02-05 LAB
CHOLEST SERPL-MCNC: 181 MG/DL
HDLC SERPL-MCNC: 70 MG/DL
LDLC SERPL CALC-MCNC: 90 MG/DL
NONHDLC SERPL-MCNC: 111 MG/DL
TRIGL SERPL-MCNC: 105 MG/DL

## 2018-02-05 PROCEDURE — 36415 COLL VENOUS BLD VENIPUNCTURE: CPT | Performed by: FAMILY MEDICINE

## 2018-02-05 PROCEDURE — 80061 LIPID PANEL: CPT | Performed by: FAMILY MEDICINE

## 2018-02-06 LAB
COPATH REPORT: NORMAL
PAP: NORMAL

## 2018-02-08 LAB
FINAL DIAGNOSIS: NORMAL
HPV HR 12 DNA CVX QL NAA+PROBE: NEGATIVE
HPV16 DNA SPEC QL NAA+PROBE: NEGATIVE
HPV18 DNA SPEC QL NAA+PROBE: NEGATIVE
SPECIMEN DESCRIPTION: NORMAL
SPECIMEN SOURCE CVX/VAG CYTO: NORMAL

## 2018-02-26 ENCOUNTER — RADIANT APPOINTMENT (OUTPATIENT)
Dept: ULTRASOUND IMAGING | Facility: CLINIC | Age: 48
End: 2018-02-26
Attending: FAMILY MEDICINE
Payer: COMMERCIAL

## 2018-02-26 DIAGNOSIS — N92.1 METRORRHAGIA: ICD-10-CM

## 2018-02-26 DIAGNOSIS — N92.0 MENORRHAGIA WITH REGULAR CYCLE: ICD-10-CM

## 2018-02-26 DIAGNOSIS — D25.1 INTRAMURAL LEIOMYOMA OF UTERUS: ICD-10-CM

## 2018-02-26 DIAGNOSIS — R93.89 ENDOMETRIAL THICKENING ON ULTRA SOUND: ICD-10-CM

## 2018-02-26 PROCEDURE — 76856 US EXAM PELVIC COMPLETE: CPT | Performed by: OBSTETRICS & GYNECOLOGY

## 2018-02-26 PROCEDURE — 76830 TRANSVAGINAL US NON-OB: CPT | Performed by: OBSTETRICS & GYNECOLOGY

## 2018-05-09 DIAGNOSIS — F41.1 ANXIETY STATE: ICD-10-CM

## 2018-05-09 NOTE — TELEPHONE ENCOUNTER
"Requested Prescriptions   Pending Prescriptions Disp Refills     sertraline (ZOLOFT) 100 MG tablet [Pharmacy Med Name: SERTRALINE HCL 100MG TABS] 90 tablet 0      Last Written Prescription Date:  2.1.18  Last Fill Quantity: 90,  # refills: 0   Last Office Visit: 2/2/2018   Future Office Visit:    Next 5 appointments (look out 90 days)     May 10, 2018  2:00 PM CDT   Pre-Op physical with Giselle Mcdonnell MD   [facility] ([facility])    [address] [place] [address] [phone]                 PHQ-9 SCORE 9/17/2013 12/19/2014 2/2/2018   Total Score 1 0 -   Total Score - - 2     MONROE-7 SCORE 9/17/2013 12/19/2014 2/2/2018   Total Score 0 0 -   Total Score - - 0          Sig: TAKE ONE TABLET BY MOUTH EVERY DAY    SSRIs Protocol Passed    5/9/2018  6:53 AM       Passed - Recent (12 mo) or future (30 days) visit within the authorizing provider's specialty    Patient had office visit in the last 12 months or has a visit in the next 30 days with authorizing provider or within the authorizing provider's specialty.  See \"Patient Info\" tab in inbasket, or \"Choose Columns\" in Meds & Orders section of the refill encounter.           Passed - Patient is age 18 or older       Passed - No active pregnancy on record       Passed - No positive pregnancy test in last 12 months          "

## 2018-05-10 ENCOUNTER — OFFICE VISIT (OUTPATIENT)
Dept: FAMILY MEDICINE | Facility: CLINIC | Age: 48
End: 2018-05-10
Payer: COMMERCIAL

## 2018-05-10 VITALS
WEIGHT: 113.6 LBS | HEART RATE: 77 BPM | DIASTOLIC BLOOD PRESSURE: 70 MMHG | TEMPERATURE: 98.4 F | OXYGEN SATURATION: 97 % | HEIGHT: 65 IN | BODY MASS INDEX: 18.93 KG/M2 | SYSTOLIC BLOOD PRESSURE: 108 MMHG

## 2018-05-10 DIAGNOSIS — D25.9 UTERINE LEIOMYOMA, UNSPECIFIED LOCATION: ICD-10-CM

## 2018-05-10 DIAGNOSIS — Z01.818 PREOP GENERAL PHYSICAL EXAM: Primary | ICD-10-CM

## 2018-05-10 DIAGNOSIS — F41.1 ANXIETY STATE: ICD-10-CM

## 2018-05-10 DIAGNOSIS — N92.0 EXCESSIVE OR FREQUENT MENSTRUATION: ICD-10-CM

## 2018-05-10 PROBLEM — I07.1 TRACE TRICUSPID REGURGITATION BY PRIOR ECHOCARDIOGRAM: Status: ACTIVE | Noted: 2018-05-10

## 2018-05-10 LAB
ALBUMIN SERPL-MCNC: 4 G/DL (ref 3.4–5)
ALBUMIN UR-MCNC: NEGATIVE MG/DL
ALP SERPL-CCNC: 65 U/L (ref 40–150)
ALT SERPL W P-5'-P-CCNC: 25 U/L (ref 0–50)
ANION GAP SERPL CALCULATED.3IONS-SCNC: 6 MMOL/L (ref 3–14)
APPEARANCE UR: CLEAR
AST SERPL W P-5'-P-CCNC: 20 U/L (ref 0–45)
BACTERIA #/AREA URNS HPF: ABNORMAL /HPF
BETA HCG QUAL IFA URINE: NEGATIVE
BILIRUB SERPL-MCNC: 0.3 MG/DL (ref 0.2–1.3)
BILIRUB UR QL STRIP: NEGATIVE
BUN SERPL-MCNC: 14 MG/DL (ref 7–30)
CALCIUM SERPL-MCNC: 9.2 MG/DL (ref 8.5–10.1)
CHLORIDE SERPL-SCNC: 108 MMOL/L (ref 94–109)
CO2 SERPL-SCNC: 29 MMOL/L (ref 20–32)
COLOR UR AUTO: YELLOW
CREAT SERPL-MCNC: 0.79 MG/DL (ref 0.52–1.04)
ERYTHROCYTE [DISTWIDTH] IN BLOOD BY AUTOMATED COUNT: 12.1 % (ref 10–15)
GFR SERPL CREATININE-BSD FRML MDRD: 77 ML/MIN/1.7M2
GLUCOSE SERPL-MCNC: 96 MG/DL (ref 70–99)
GLUCOSE UR STRIP-MCNC: NEGATIVE MG/DL
HCT VFR BLD AUTO: 41 % (ref 35–47)
HGB BLD-MCNC: 13.9 G/DL (ref 11.7–15.7)
HGB UR QL STRIP: ABNORMAL
KETONES UR STRIP-MCNC: ABNORMAL MG/DL
LEUKOCYTE ESTERASE UR QL STRIP: NEGATIVE
MCH RBC QN AUTO: 30.8 PG (ref 26.5–33)
MCHC RBC AUTO-ENTMCNC: 33.9 G/DL (ref 31.5–36.5)
MCV RBC AUTO: 91 FL (ref 78–100)
NITRATE UR QL: NEGATIVE
NON-SQ EPI CELLS #/AREA URNS LPF: ABNORMAL /LPF
PH UR STRIP: 5.5 PH (ref 5–7)
PLATELET # BLD AUTO: 228 10E9/L (ref 150–450)
POTASSIUM SERPL-SCNC: 4 MMOL/L (ref 3.4–5.3)
PROT SERPL-MCNC: 7.6 G/DL (ref 6.8–8.8)
RBC # BLD AUTO: 4.52 10E12/L (ref 3.8–5.2)
RBC #/AREA URNS AUTO: ABNORMAL /HPF
SODIUM SERPL-SCNC: 143 MMOL/L (ref 133–144)
SOURCE: ABNORMAL
SP GR UR STRIP: >1.03 (ref 1–1.03)
UROBILINOGEN UR STRIP-ACNC: 0.2 EU/DL (ref 0.2–1)
WBC # BLD AUTO: 6.1 10E9/L (ref 4–11)
WBC #/AREA URNS AUTO: ABNORMAL /HPF

## 2018-05-10 PROCEDURE — 36415 COLL VENOUS BLD VENIPUNCTURE: CPT | Performed by: FAMILY MEDICINE

## 2018-05-10 PROCEDURE — 84703 CHORIONIC GONADOTROPIN ASSAY: CPT | Performed by: FAMILY MEDICINE

## 2018-05-10 PROCEDURE — 81001 URINALYSIS AUTO W/SCOPE: CPT | Performed by: FAMILY MEDICINE

## 2018-05-10 PROCEDURE — 80053 COMPREHEN METABOLIC PANEL: CPT | Performed by: FAMILY MEDICINE

## 2018-05-10 PROCEDURE — 85027 COMPLETE CBC AUTOMATED: CPT | Performed by: FAMILY MEDICINE

## 2018-05-10 PROCEDURE — 99214 OFFICE O/P EST MOD 30 MIN: CPT | Performed by: FAMILY MEDICINE

## 2018-05-10 RX ORDER — SERTRALINE HYDROCHLORIDE 100 MG/1
100 TABLET, FILM COATED ORAL DAILY
Qty: 90 TABLET | Refills: 1 | Status: SHIPPED | OUTPATIENT
Start: 2018-05-10 | End: 2019-08-05

## 2018-05-10 RX ORDER — SERTRALINE HYDROCHLORIDE 100 MG/1
TABLET, FILM COATED ORAL
Qty: 90 TABLET | Refills: 1 | Status: SHIPPED | OUTPATIENT
Start: 2018-05-10 | End: 2018-05-10

## 2018-05-10 ASSESSMENT — ANXIETY QUESTIONNAIRES
3. WORRYING TOO MUCH ABOUT DIFFERENT THINGS: NOT AT ALL
2. NOT BEING ABLE TO STOP OR CONTROL WORRYING: NOT AT ALL
IF YOU CHECKED OFF ANY PROBLEMS ON THIS QUESTIONNAIRE, HOW DIFFICULT HAVE THESE PROBLEMS MADE IT FOR YOU TO DO YOUR WORK, TAKE CARE OF THINGS AT HOME, OR GET ALONG WITH OTHER PEOPLE: NOT DIFFICULT AT ALL
6. BECOMING EASILY ANNOYED OR IRRITABLE: NOT AT ALL
5. BEING SO RESTLESS THAT IT IS HARD TO SIT STILL: NOT AT ALL
GAD7 TOTAL SCORE: 1
7. FEELING AFRAID AS IF SOMETHING AWFUL MIGHT HAPPEN: NOT AT ALL
1. FEELING NERVOUS, ANXIOUS, OR ON EDGE: NOT AT ALL

## 2018-05-10 ASSESSMENT — PATIENT HEALTH QUESTIONNAIRE - PHQ9: 5. POOR APPETITE OR OVEREATING: SEVERAL DAYS

## 2018-05-10 NOTE — MR AVS SNAPSHOT
After Visit Summary   5/10/2018    Jono Templeton    MRN: [id number]           Patient Information     Date Of Birth          1970        Visit Information        Provider Department      5/10/2018 2:00 PM Giselle Mcdonnell MD [facility]        Today's Diagnoses     Preop general physical exam    -  1    Excessive or frequent menstruation        Uterine leiomyoma, unspecified location        Anxiety state          Care Instructions    Pt will have no further pain medication other than Tylenol from now until after surgery.     -Patient is to hold all scheduled medications on the day of surgery.  Ok to take sertraline later that day, when able to take oral liquids again.       Before Your Surgery      Call your surgeon if there is any change in your health. This includes signs of a cold or flu (such as a sore throat, runny nose, cough, rash or fever).    Do not smoke, drink alcohol or take over the counter medicine (unless your surgeon or primary care doctor tells you to) for the 24 hours before and after surgery.    If you take prescribed drugs: Follow your doctor s orders about which medicines to take and which to stop until after surgery.    Eating and drinking prior to surgery: follow the instructions from your surgeon    Take a shower or bath the night before surgery. Use the soap your surgeon gave you to gently clean your skin. If you do not have soap from your surgeon, use your regular soap. Do not shave or scrub the surgery site.  Wear clean pajamas and have clean sheets on your bed.                Thank you for choosing [facility]  for your Health Care. It was a pleasure seeing you at your visit today. Please contact us with any questions or concerns you may have.                   Giselle Mcdonnell MD                                  To reach your [facility] care team after hours call:   [phone]    Our clinic hours are:      Monday- 7:30 am - 7:00 pm                             Tuesday through Friday- 7:30 am - 5:00 pm                                        Saturday- 8:00 am - 12:00 pm                  Phone:  [phone]    Our pharmacy hours are:     Monday  8:00 am to 7:00 pm      Tuesday through Friday 8:00am to 6:00pm                        Saturday - 9:00 am to 1:00 pm      Sunday : Closed.              Phone:  [phone]      There is also information available at our web site:  [email or web address]    If your provider ordered any lab tests and you do not receive the results within 10 business days, please call the clinic.    If you need a medication refill please contact your pharmacy.  Please allow 2 business days for your refill to be completed.    Our clinic offers telephone visits and e visits.  Please ask one of your team members to explain more.      Use Huolit (secure email communication and access to your chart) to send your primary care provider a message or make an appointment. Ask someone on your Team how to sign up for Huolit.                       Follow-ups after your visit        Who to contact     If you have questions or need follow up information about today's clinic visit or your schedule please contact [facility] directly at [phone].  Normal or non-critical lab and imaging results will be communicated to you by MyChart, letter or phone within 4 business days after the clinic has received the results. If you do not hear from us within 7 days, please contact the clinic through [email or web address] or phone. If you have a critical or abnormal lab result, we will notify you by phone as soon as possible.  Submit refill requests through [email or web address] or call your pharmacy and they will forward the refill request to us. Please allow 3 business days for your refill to be completed.          Additional Information About Your Visit        [email or web address] Information     [email or web address] gives you secure access to your electronic  "health record. If you see a primary care provider, you can also send messages to your care team and make appointments. If you have questions, please call your primary care clinic.  If you do not have a primary care provider, please call [phone] and they will assist you.        Care EveryWhere ID     This is your Care EveryWhere ID. This could be used by other organizations to access your [facility] medical records  [id number]        Your Vitals Were     Pulse Temperature Height Last Period Pulse Oximetry BMI (Body Mass Index)    77 98.4  F (36.9  C) (Oral) 5' 4.5\" (1.638 m) 05/08/2018 (Exact Date) 97% 19.2 kg/m2       Blood Pressure from Last 3 Encounters:   05/10/18 108/70   02/02/18 104/70   01/29/18 102/64    Weight from Last 3 Encounters:   05/10/18 113 lb 9.6 oz (51.5 kg)   02/02/18 112 lb 9.6 oz (51.1 kg)   01/29/18 113 lb (51.3 kg)              We Performed the Following     *UA reflex to Microscopic and Culture ([facility] (except [place] and [place])     Beta HCG qual IFA urine     CBC with platelets     Comprehensive metabolic panel     Urine Microscopic          Today's Medication Changes          These changes are accurate as of 5/10/18  2:56 PM.  If you have any questions, ask your nurse or doctor.               These medicines have changed or have updated prescriptions.        Dose/Directions    sertraline 100 MG tablet   Commonly known as:  ZOLOFT   This may have changed:  See the new instructions.   Used for:  Anxiety state   Changed by:  Giselle Mcdonnell MD        Dose:  100 mg   Take 1 tablet (100 mg) by mouth daily   Quantity:  90 tablet   Refills:  1            Where to get your medicines      These medications were sent to [facility] - [address] [address]     Phone:  [phone]     sertraline 100 MG tablet                Primary Care Provider Office Phone # Fax #    Giselle " MALU Mcdonnell -[phone]       [address] [facility] [address]        Equal Access to Services     CHAO BERMEO : Marc Nevarez, abigail bach, donajacquie hutchinsonfelicitassadi limonjohannesadi, norma lunain hayaadaisha limonada babcock katja simmons. So [facility] [phone].    ATENCIÓN: Si habla español, tiene a arreola disposición servicios gratuitos de asistencia lingüística. Llame al [phone].    We comply with applicable federal civil rights laws and [place] laws. We do not discriminate on the basis of race, color, national origin, age, disability, sex, sexual orientation, or gender identity.            Thank you!     Thank you for choosing [facility]  for your care. Our goal is always to provide you with excellent care. Hearing back from our patients is one way we can continue to improve our services. Please take a few minutes to complete the written survey that you may receive in the mail after your visit with us. Thank you!             Your Updated Medication List - Protect others around you: Learn how to safely use, store and throw away your medicines at www.disposemymeds.org.          This list is accurate as of 5/10/18  2:56 PM.  Always use your most recent med list.                   Brand Name Dispense Instructions for use Diagnosis    calcium carbonate 500 MG tablet    OS-LAUREN 500 mg [place]. [place]    180 tablet    Take by mouth 2 times daily        cholecalciferol 1000 UNIT tablet    vitamin D3    100 tablet    Take 1 tablet (1,000 Units) by mouth daily        fluocinonide 0.05 % ointment    LIDEX      Acute URI, Cough, Fever, unspecified fever cause       PROBIOTIC PEARLS Caps      Take by mouth daily        sertraline 100 MG tablet    ZOLOFT    90 tablet    Take 1 tablet (100 mg) by mouth daily    Anxiety state
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
## 2018-05-10 NOTE — LETTER
[facility] - [address] [address]                                                                                                       [phone]    May 10, 2018    Jono Templeton  [address] [address]      To Whom it May Concern:    The above patient is unable to attend work for six weeks starting 5/21/2018 secondary to hysterectomy surgery that she is undergoing on 5/21/2018.  Please contact me with questions or concerns.      Sincerely,       Giselle Mcdonnell M.D.

## 2018-05-10 NOTE — PATIENT INSTRUCTIONS
Pt will have no further pain medication other than Tylenol from now until after surgery.     -Patient is to hold all scheduled medications on the day of surgery.  Ok to take sertraline later that day, when able to take oral liquids again.       Before Your Surgery      Call your surgeon if there is any change in your health. This includes signs of a cold or flu (such as a sore throat, runny nose, cough, rash or fever).    Do not smoke, drink alcohol or take over the counter medicine (unless your surgeon or primary care doctor tells you to) for the 24 hours before and after surgery.    If you take prescribed drugs: Follow your doctor s orders about which medicines to take and which to stop until after surgery.    Eating and drinking prior to surgery: follow the instructions from your surgeon    Take a shower or bath the night before surgery. Use the soap your surgeon gave you to gently clean your skin. If you do not have soap from your surgeon, use your regular soap. Do not shave or scrub the surgery site.  Wear clean pajamas and have clean sheets on your bed.                Thank you for choosing [facility]  for your Health Care. It was a pleasure seeing you at your visit today. Please contact us with any questions or concerns you may have.                   Giselle Mcdonnell MD                                  To reach your [facility] care team after hours call:   [phone]    Our clinic hours are:     Monday- 7:30 am - 7:00 pm                             Tuesday through Friday- 7:30 am - 5:00 pm                                        Saturday- 8:00 am - 12:00 pm                  Phone:  [phone]    Our pharmacy hours are:     Monday  8:00 am to 7:00 pm      Tuesday through Friday 8:00am to 6:00pm                        Saturday - 9:00 am to 1:00 pm      Sunday : Closed.              Phone:  [phone]      There is also information available at our web site:   [email or web address]    If your provider ordered any lab tests and you do not receive the results within 10 business days, please call the clinic.    If you need a medication refill please contact your pharmacy.  Please allow 2 business days for your refill to be completed.    Our clinic offers telephone visits and e visits.  Please ask one of your team members to explain more.      Use [email or web address] (secure email communication and access to your chart) to send your primary care provider a message or make an appointment. Ask someone on your Team how to sign up for [email or web address].

## 2018-05-11 ASSESSMENT — PATIENT HEALTH QUESTIONNAIRE - PHQ9: SUM OF ALL RESPONSES TO PHQ QUESTIONS 1-9: 1

## 2018-05-11 ASSESSMENT — ANXIETY QUESTIONNAIRES: GAD7 TOTAL SCORE: 1

## 2018-05-21 ENCOUNTER — HOSPITAL PATHOLOGY (OUTPATIENT)
Dept: OTHER | Facility: CLINIC | Age: 48
End: 2018-05-21

## 2018-05-23 LAB — COPATH REPORT: NORMAL

## 2018-07-01 ENCOUNTER — HEALTH MAINTENANCE LETTER (OUTPATIENT)
Age: 48
End: 2018-07-01

## 2018-12-13 DIAGNOSIS — F41.1 ANXIETY STATE: ICD-10-CM

## 2018-12-13 RX ORDER — SERTRALINE HYDROCHLORIDE 100 MG/1
TABLET, FILM COATED ORAL
Qty: 90 TABLET | Refills: 0 | Status: SHIPPED | OUTPATIENT
Start: 2018-12-13 | End: 2019-03-16

## 2018-12-13 NOTE — TELEPHONE ENCOUNTER
Refilled per RN Protocol.   Patient will be due for PX after 02/02/2019 - note sent with refill    Samira Ram RN  [place] Triage

## 2018-12-13 NOTE — TELEPHONE ENCOUNTER
"Requested Prescriptions   Pending Prescriptions Disp Refills     sertraline (ZOLOFT) 100 MG tablet [Pharmacy Med Name: SERTRALINE HCL 100MG TABS]  Last Written Prescription Date:  5/10/18  Last Fill Quantity: 90,  # refills: 1   Last office visit: 5/10/2018 with prescribing provider:  Sathya   Future Office Visit:    PHQ-9 SCORE 12/19/2014 2/2/2018 5/10/2018   PHQ-9 Total Score 0 - -   PHQ-9 Total Score - 2 1     MONROE-7 SCORE 12/19/2014 2/2/2018 5/10/2018   Total Score 0 - -   Total Score - 0 1    90 tablet 1     Sig: TAKE ONE TABLET BY MOUTH EVERY DAY    SSRIs Protocol Passed - 12/13/2018  5:02 AM       Passed - Recent (12 mo) or future (30 days) visit within the authorizing provider's specialty    Patient had office visit in the last 12 months or has a visit in the next 30 days with authorizing provider or within the authorizing provider's specialty.  See \"Patient Info\" tab in inbasket, or \"Choose Columns\" in Meds & Orders section of the refill encounter.             Passed - Patient is age 18 or older       Passed - No active pregnancy on record       Passed - No positive pregnancy test in last 12 months          "

## 2018-12-14 DIAGNOSIS — F41.1 ANXIETY STATE: ICD-10-CM

## 2018-12-17 RX ORDER — SERTRALINE HYDROCHLORIDE 100 MG/1
TABLET, FILM COATED ORAL
Qty: 90 TABLET | Refills: 0 | Status: SHIPPED | OUTPATIENT
Start: 2018-12-17 | End: 2019-08-05

## 2018-12-17 NOTE — TELEPHONE ENCOUNTER
"Requested Prescriptions   Pending Prescriptions Disp Refills     sertraline (ZOLOFT) 100 MG tablet [Pharmacy Med Name: SERTRALINE HCL 100MG TABS] 90 tablet 1     Sig: TAKE ONE TABLET BY MOUTH EVERY DAY    SSRIs Protocol Passed - 12/14/2018  5:02 AM       Passed - Recent (12 mo) or future (30 days) visit within the authorizing provider's specialty    Patient had office visit in the last 12 months or has a visit in the next 30 days with authorizing provider or within the authorizing provider's specialty.  See \"Patient Info\" tab in inbasket, or \"Choose Columns\" in Meds & Orders section of the refill encounter.             Passed - Patient is age 18 or older       Passed - No active pregnancy on record       Passed - No positive pregnancy test in last 12 months        PHQ-9 SCORE 12/19/2014 2/2/2018 5/10/2018   PHQ-9 Total Score 0 - -   PHQ-9 Total Score - 2 1     MONROE-7 SCORE 12/19/2014 2/2/2018 5/10/2018   Total Score 0 - -   Total Score - 0 1       Medication is being filled for 1 time refill only due to:  pt due for surveys for further refills   Gale Hogan RN  [facility]      "

## 2019-03-16 DIAGNOSIS — F41.1 ANXIETY STATE: ICD-10-CM

## 2019-03-16 NOTE — TELEPHONE ENCOUNTER
"Requested Prescriptions   Pending Prescriptions Disp Refills     sertraline (ZOLOFT) 100 MG tablet [Pharmacy Med Name: SERTRALINE HCL 100MG TABS]  Last Written Prescription Date:  12/13/18  Last Fill Quantity: 90,  # refills: 0   Last office visit: 5/10/2018 with prescribing provider:  Sathya   Future Office Visit:     90 tablet 0     Sig: TAKE ONE TABLET BY MOUTH ONCE DAILY (NEED TO BE SEEN IN CLINIC FOR FURTHER REFILLS)    SSRIs Protocol Passed - 3/16/2019  5:04 AM   PHQ-9 SCORE 12/19/2014 2/2/2018 5/10/2018   PHQ-9 Total Score 0 - -   PHQ-9 Total Score - 2 1     MONROE-7 SCORE 12/19/2014 2/2/2018 5/10/2018   Total Score 0 - -   Total Score - 0 1       Passed - Recent (12 mo) or future (30 days) visit within the authorizing provider's specialty    Patient had office visit in the last 12 months or has a visit in the next 30 days with authorizing provider or within the authorizing provider's specialty.  See \"Patient Info\" tab in inbasket, or \"Choose Columns\" in Meds & Orders section of the refill encounter.             Passed - Medication is active on med list       Passed - Patient is age 18 or older       Passed - No active pregnancy on record       Passed - No positive pregnancy test in last 12 months          "

## 2019-03-20 RX ORDER — SERTRALINE HYDROCHLORIDE 100 MG/1
TABLET, FILM COATED ORAL
Qty: 90 TABLET | Refills: 0 | Status: SHIPPED | OUTPATIENT
Start: 2019-03-20 | End: 2019-06-24

## 2019-06-24 ENCOUNTER — TELEPHONE (OUTPATIENT)
Dept: FAMILY MEDICINE | Facility: CLINIC | Age: 49
End: 2019-06-24

## 2019-06-24 DIAGNOSIS — Z13.6 CARDIOVASCULAR SCREENING; LDL GOAL LESS THAN 160: Primary | ICD-10-CM

## 2019-06-24 DIAGNOSIS — Z00.01 ENCOUNTER FOR ROUTINE ADULT MEDICAL EXAM WITH ABNORMAL FINDINGS: ICD-10-CM

## 2019-06-24 DIAGNOSIS — F41.1 ANXIETY STATE: ICD-10-CM

## 2019-06-24 NOTE — TELEPHONE ENCOUNTER
"Requested Prescriptions   Pending Prescriptions Disp Refills     sertraline (ZOLOFT) 100 MG tablet [Pharmacy Med Name: SERTRALINE HCL 100MG TABS]          Last Written Prescription Date:  3.20.19  Last Fill Quantity: 90 tablet,  # refills: 0   Last office visit: 5/10/2018 with prescribing provider:  Giselle Mcdonnell MD               Future Office Visit:       90 tablet 0     Sig: TAKE ONE TABLET BY MOUTH ONCE DAILY (NEED TO BE SEEN IN CLINIC FOR FURTHER REFILLS)       SSRIs Protocol Failed - 6/24/2019  5:02 AM             PHQ-9 SCORE 12/19/2014 2/2/2018 5/10/2018   PHQ-9 Total Score 0 - -   PHQ-9 Total Score - 2 1     MONROE-7 SCORE 12/19/2014 2/2/2018 5/10/2018   Total Score 0 - -   Total Score - 0 1              Failed - Recent (12 mo) or future (30 days) visit within the authorizing provider's specialty     Patient had office visit in the last 12 months or has a visit in the next 30 days with authorizing provider or within the authorizing provider's specialty.  See \"Patient Info\" tab in inbasket, or \"Choose Columns\" in Meds & Orders section of the refill encounter.              Passed - Medication is active on med list        Passed - Patient is age 18 or older        Passed - No active pregnancy on record        Passed - No positive pregnancy test in last 12 months        "

## 2019-06-26 RX ORDER — SERTRALINE HYDROCHLORIDE 100 MG/1
TABLET, FILM COATED ORAL
Qty: 30 TABLET | Refills: 0 | Status: SHIPPED | OUTPATIENT
Start: 2019-06-26 | End: 2019-08-02

## 2019-06-26 NOTE — TELEPHONE ENCOUNTER
Medication is being filled for a one time refill only as patient is due for yearly appointment and med check.  30 day refill given.  Please call and assist with scheduling appointment prior to next refill     Nevin SNOWDEN RN   [facility]

## 2019-06-27 NOTE — TELEPHONE ENCOUNTER
Patient declined to see a different provider.  Scheduled with Dr [facility]cdonnell [facility], 9/23.  Also scheduled a lab appt 9/19.  Please place lab orders for this lab appt.    Also, please send medication to get patient through to appt or advise.    Brisa Giron

## 2019-06-27 NOTE — TELEPHONE ENCOUNTER
Routing to PCP for further review/recommendations/orders.    Samira Ram RN  [facility]

## 2019-07-01 NOTE — TELEPHONE ENCOUNTER
Message handled by nurse triage.       PLAN: Dr. Mcdonnell gave verbal orders for CBC, CMP, Lipid reflex and TSH reflex.    Future orders placed      OLIMPIA Hatfield, RN, N  [facility]) [phone]

## 2019-08-02 DIAGNOSIS — F41.1 ANXIETY STATE: ICD-10-CM

## 2019-08-02 RX ORDER — SERTRALINE HYDROCHLORIDE 100 MG/1
TABLET, FILM COATED ORAL
Qty: 60 TABLET | Refills: 0 | Status: SHIPPED | OUTPATIENT
Start: 2019-08-02 | End: 2019-09-23

## 2019-08-02 NOTE — TELEPHONE ENCOUNTER
"Requested Prescriptions   Pending Prescriptions Disp Refills     sertraline (ZOLOFT) 100 MG tablet [Pharmacy Med Name: SERTRALINE HCL  Last Written Prescription Date:  6/26/19  Last Fill Quantity: 30,  # refills: 0   Last Office Visit: 5/10/2018   Future Office Visit:    Next 5 appointments (look out 90 days)    Sep 23, 2019  3:00 PM CDT  [facility] [address] [place] [id number] [phone]          100MG TABS] 30 tablet 0     Sig: TAKE ONE TABLET BY MOUTH ONCE DAILY (PLEASE MAKE APPOINTMENT FOR FURTHER REFILLS)       SSRIs Protocol Failed - 8/2/2019  5:02 AM   PHQ-9 SCORE 12/19/2014 2/2/2018 5/10/2018   PHQ-9 Total Score 0 - -   PHQ-9 Total Score - 2 1     MONROE-7 SCORE 12/19/2014 2/2/2018 5/10/2018   Total Score 0 - -   Total Score - 0 1        Failed - Recent (12 mo) or future (30 days) visit within the authorizing provider's specialty     Patient had office visit in the last 12 months or has a visit in the next 30 days with authorizing provider or within the authorizing provider's specialty.  See \"Patient Info\" tab in inbasket, or \"Choose Columns\" in Meds & Orders section of the refill encounter.              Passed - Medication is active on med list        Passed - Patient is age 18 or older        Passed - No active pregnancy on record        Passed - No positive pregnancy test in last 12 months          "

## 2019-08-02 NOTE — TELEPHONE ENCOUNTER
Patient due for fasting physical - scheduled for 9/23/19  Medication is being filled for 1 time refill only due to:  Patient needs to be seen because it has been more than one year since last visit.    Samira Ram RN  [facility]

## 2019-08-03 DIAGNOSIS — F41.1 ANXIETY STATE: ICD-10-CM

## 2019-08-05 RX ORDER — SERTRALINE HYDROCHLORIDE 100 MG/1
TABLET, FILM COATED ORAL
Qty: 30 TABLET | Refills: 0 | OUTPATIENT
Start: 2019-08-05

## 2019-08-05 NOTE — TELEPHONE ENCOUNTER
"Requested Prescriptions   Pending Prescriptions Disp Refills     sertraline (ZOLOFT) 100 MG tablet [Pharmacy Med Name: SERTRALINE HCL 100MG TABS] 30 tablet 0     Sig: TAKE ONE TABLET BY MOUTH ONCE DAILY (PLEASE MAKE APPOINTMENT FOR FURTHER REFILLS)       Last Refill:    Disp Refills Start End LISA   sertraline (ZOLOFT) 100 MG tablet 60 tablet 0 8/2/2019  No   Sig: TAKE ONE TABLET BY MOUTH ONCE DAILY (PLEASE MAKE APPOINTMENT FOR FURTHER REFILLS)   Sent to pharmacy as: sertraline (ZOLOFT) 100 MG tablet   Class: E-Prescribe   Notes to Pharmacy: Due for an Office visit for further refills, only fill for 60 days. Please inform patient     SSRIs Protocol Failed - 8/3/2019  5:04 AM        Failed - Recent (12 mo) or future (30 days) visit within the authorizing provider's specialty     Patient had office visit in the last 12 months or has a visit in the next 30 days with authorizing provider or within the authorizing provider's specialty.  See \"Patient Info\" tab in inbasket, or \"Choose Columns\" in Meds & Orders section of the refill encounter.      LOV: 05/10/2018          Passed - Medication is active on med list        Passed - Patient is age 18 or older        Passed - No active pregnancy on record        Passed - No positive pregnancy test in last 12 months        Patient already given 1x [facility]ina refill and did not follow up (due for PHYSICAL) - scheduled for 09/23/2019  60 day supply was sent on 8/2 - should have enough to get to appt    Samira Ram RN  [facility]     "

## 2019-09-19 DIAGNOSIS — Z13.6 CARDIOVASCULAR SCREENING; LDL GOAL LESS THAN 160: ICD-10-CM

## 2019-09-19 DIAGNOSIS — Z00.01 ENCOUNTER FOR ROUTINE ADULT MEDICAL EXAM WITH ABNORMAL FINDINGS: ICD-10-CM

## 2019-09-19 LAB
ALBUMIN SERPL-MCNC: 4 G/DL (ref 3.4–5)
ALP SERPL-CCNC: 55 U/L (ref 40–150)
ALT SERPL W P-5'-P-CCNC: 20 U/L (ref 0–50)
ANION GAP SERPL CALCULATED.3IONS-SCNC: 5 MMOL/L (ref 3–14)
AST SERPL W P-5'-P-CCNC: 16 U/L (ref 0–45)
BILIRUB SERPL-MCNC: 0.4 MG/DL (ref 0.2–1.3)
BUN SERPL-MCNC: 10 MG/DL (ref 7–30)
CALCIUM SERPL-MCNC: 8.6 MG/DL (ref 8.5–10.1)
CHLORIDE SERPL-SCNC: 107 MMOL/L (ref 94–109)
CHOLEST SERPL-MCNC: 207 MG/DL
CO2 SERPL-SCNC: 25 MMOL/L (ref 20–32)
CREAT SERPL-MCNC: 0.61 MG/DL (ref 0.52–1.04)
ERYTHROCYTE [DISTWIDTH] IN BLOOD BY AUTOMATED COUNT: 12.1 % (ref 10–15)
GFR SERPL CREATININE-BSD FRML MDRD: >90 ML/MIN/{1.73_M2}
GLUCOSE SERPL-MCNC: 84 MG/DL (ref 70–99)
HCT VFR BLD AUTO: 41 % (ref 35–47)
HDLC SERPL-MCNC: 72 MG/DL
HGB BLD-MCNC: 14.3 G/DL (ref 11.7–15.7)
LDLC SERPL CALC-MCNC: 115 MG/DL
MCH RBC QN AUTO: 31.4 PG (ref 26.5–33)
MCHC RBC AUTO-ENTMCNC: 34.9 G/DL (ref 31.5–36.5)
MCV RBC AUTO: 90 FL (ref 78–100)
NONHDLC SERPL-MCNC: 135 MG/DL
PLATELET # BLD AUTO: 209 10E9/L (ref 150–450)
POTASSIUM SERPL-SCNC: 3.8 MMOL/L (ref 3.4–5.3)
PROT SERPL-MCNC: 7.5 G/DL (ref 6.8–8.8)
RBC # BLD AUTO: 4.55 10E12/L (ref 3.8–5.2)
SODIUM SERPL-SCNC: 137 MMOL/L (ref 133–144)
TRIGL SERPL-MCNC: 100 MG/DL
TSH SERPL DL<=0.005 MIU/L-ACNC: 1.66 MU/L (ref 0.4–4)
WBC # BLD AUTO: 6.4 10E9/L (ref 4–11)

## 2019-09-19 PROCEDURE — 80053 COMPREHEN METABOLIC PANEL: CPT | Performed by: FAMILY MEDICINE

## 2019-09-19 PROCEDURE — 80061 LIPID PANEL: CPT | Performed by: FAMILY MEDICINE

## 2019-09-19 PROCEDURE — 36415 COLL VENOUS BLD VENIPUNCTURE: CPT | Performed by: FAMILY MEDICINE

## 2019-09-19 PROCEDURE — 84443 ASSAY THYROID STIM HORMONE: CPT | Performed by: FAMILY MEDICINE

## 2019-09-19 PROCEDURE — 85027 COMPLETE CBC AUTOMATED: CPT | Performed by: FAMILY MEDICINE

## 2019-09-23 ENCOUNTER — OFFICE VISIT (OUTPATIENT)
Dept: FAMILY MEDICINE | Facility: CLINIC | Age: 49
End: 2019-09-23
Payer: COMMERCIAL

## 2019-09-23 VITALS
BODY MASS INDEX: 18.99 KG/M2 | DIASTOLIC BLOOD PRESSURE: 60 MMHG | OXYGEN SATURATION: 96 % | HEART RATE: 87 BPM | HEIGHT: 65 IN | WEIGHT: 114 LBS | SYSTOLIC BLOOD PRESSURE: 96 MMHG | TEMPERATURE: 98.5 F

## 2019-09-23 DIAGNOSIS — F41.1 ANXIETY STATE: ICD-10-CM

## 2019-09-23 DIAGNOSIS — Z00.01 ENCOUNTER FOR ROUTINE ADULT HEALTH EXAMINATION WITH ABNORMAL FINDINGS: Primary | ICD-10-CM

## 2019-09-23 DIAGNOSIS — Z12.31 VISIT FOR SCREENING MAMMOGRAM: ICD-10-CM

## 2019-09-23 DIAGNOSIS — E55.9 VITAMIN D DEFICIENCY: ICD-10-CM

## 2019-09-23 DIAGNOSIS — N92.0 EXCESSIVE OR FREQUENT MENSTRUATION: ICD-10-CM

## 2019-09-23 DIAGNOSIS — Z90.710 S/P LAPAROSCOPIC ASSISTED VAGINAL HYSTERECTOMY (LAVH): ICD-10-CM

## 2019-09-23 DIAGNOSIS — L23.4 ALLERGIC CONTACT DERMATITIS DUE TO DYES: ICD-10-CM

## 2019-09-23 PROCEDURE — 90471 IMMUNIZATION ADMIN: CPT | Performed by: FAMILY MEDICINE

## 2019-09-23 PROCEDURE — 90686 IIV4 VACC NO PRSV 0.5 ML IM: CPT | Performed by: FAMILY MEDICINE

## 2019-09-23 PROCEDURE — 99396 PREV VISIT EST AGE 40-64: CPT | Mod: 25 | Performed by: FAMILY MEDICINE

## 2019-09-23 PROCEDURE — 99213 OFFICE O/P EST LOW 20 MIN: CPT | Mod: 25 | Performed by: FAMILY MEDICINE

## 2019-09-23 RX ORDER — SERTRALINE HYDROCHLORIDE 100 MG/1
TABLET, FILM COATED ORAL
Qty: 90 TABLET | Refills: 3 | Status: SHIPPED | OUTPATIENT
Start: 2019-09-23 | End: 2020-10-01

## 2019-09-23 RX ORDER — FLUOCINONIDE 0.5 MG/G
OINTMENT TOPICAL
Qty: 15 G | Refills: 3 | Status: SHIPPED | OUTPATIENT
Start: 2019-09-23 | End: 2020-11-18

## 2019-09-23 ASSESSMENT — PATIENT HEALTH QUESTIONNAIRE - PHQ9
5. POOR APPETITE OR OVEREATING: NOT AT ALL
SUM OF ALL RESPONSES TO PHQ QUESTIONS 1-9: 1

## 2019-09-23 ASSESSMENT — ANXIETY QUESTIONNAIRES
1. FEELING NERVOUS, ANXIOUS, OR ON EDGE: NOT AT ALL
3. WORRYING TOO MUCH ABOUT DIFFERENT THINGS: NOT AT ALL
5. BEING SO RESTLESS THAT IT IS HARD TO SIT STILL: NOT AT ALL
7. FEELING AFRAID AS IF SOMETHING AWFUL MIGHT HAPPEN: NOT AT ALL
2. NOT BEING ABLE TO STOP OR CONTROL WORRYING: NOT AT ALL

## 2019-09-23 ASSESSMENT — MIFFLIN-ST. JEOR: SCORE: 1135.04

## 2019-09-23 NOTE — PROGRESS NOTES
SUBJECTIVE:   CC: Jono Templeton is an 49 year old woman who presents for preventive health visit.     Healthy Habits:    Do you get at least three servings of calcium containing foods daily (dairy, green leafy vegetables, etc.)? yes    Amount of exercise or daily activities, outside of work: 5 day(s) per week    Problems taking medications regularly No    Medication side effects: No    Have you had an eye exam in the past two years? yes    Do you see a dentist twice per year? yes    Do you have sleep apnea, excessive snoring or daytime drowsiness?no    Anxiety Follow-Up    How are you doing with your anxiety since your last visit? Stable    Are you having other symptoms that might be associated with anxiety? No    Have you had a significant life event? No     Are you feeling depressed? No    Do you have any concerns with your use of alcohol or other drugs? No    Social History     Tobacco Use     Smoking status: Never Smoker     Smokeless tobacco: Never Used   Substance Use Topics     Alcohol use: Yes     Comment: once a month     Drug use: No     MONROE-7 SCORE 12/19/2014 2/2/2018 5/10/2018   Total Score 0 - -   Total Score - 0 1     PHQ 2/2/2018 5/10/2018   PHQ-9 Total Score 2 1   Q9: Thoughts of better off dead/self-harm past 2 weeks Not at all Not at all     Hysterectomy  S/p laparoscopic assisted vaginal hysterectomy 05/21/2018 -  had 4 uterine fibroids. Surgery went well. Uterus and cervix were removed - still has bilateral ovaries in place.     Elevated cholesterol  Had labs done 09/19/2019 and noticed total lipids were elevated. States she never had this issue before. Notes in past she was not as active since she had to drive her son around often - spent 2.5hrs a day driving.     Rash  States rash was present all over head - had reaction to hair dye. Rash now is not bad, primarily on posterior neck area. Using Lidex topical ointment.     History of shingles  Patient has history of shingles 8-9 years ago.      Today's PHQ-2 Score:   PHQ-2 (  [employer]) 5/10/2018 2018   Q1: Little interest or pleasure in doing things 0 0   Q2: Feeling down, depressed or hopeless 0 0   PHQ-2 Score 0 0       Abuse: Current or Past(Physical, Sexual or Emotional)- No  Do you feel safe in your environment? Yes    Social History     Tobacco Use     Smoking status: Never Smoker     Smokeless tobacco: Never Used   Substance Use Topics     Alcohol use: Yes     Comment: once a month     If you drink alcohol do you typically have >3 drinks per day or >7 drinks per week? No                     Reviewed orders with patient.  Reviewed health maintenance and updated orders accordingly - Yes  Patient Active Problem List   Diagnosis     Esophageal reflux     Anxiety state     Generalized anxiety disorder     CARDIOVASCULAR SCREENING; LDL GOAL LESS THAN 160     Osteopenia- left hip fr.  DEXA scan from [place], [place]     Low back pain     Family history of Sjogren's disease- mother      Family history of rheumatoid arthritis- MGM and mother     Hand joint pain     Symptomatic premature menopausal symptoms - still having menses     Right ovarian cyst-complex right ovarian cystic lesion measures 4.9 cm- f/u u/s in late 2013 ordered     Dysmenorrhea     Menorrhagia with regular cycle     Intramural leiomyoma of uterus- 0.9cm in 10/2013     Excessive thirst- at night mostly     Family history of keratoconjunctivitis sicca in Sjogren's syndrome     First degree uterine prolapse     Trace tricuspid regurgitation by prior echocardiogram in 2016      Excessive or frequent menstruation- scheduled for hysterectomy 2018 at [facility]     Uterine leiomyoma, unspecified location     Past Surgical History:   Procedure Laterality Date     C NONSPECIFIC PROCEDURE  2002         EYE SURGERY         Social History     Tobacco Use     Smoking status: Never Smoker     Smokeless tobacco: Never Used   Substance Use Topics     Alcohol use: Yes     Comment:  once a month     Family History   Problem Relation Age of Onset     Connective Tissue Disorder Mother         sjogrens     Autoimmune Disease Mother      Aneurysm Father      Allergies Paternal Grandmother         Asthma         Current Outpatient Medications   Medication Sig Dispense Refill     calcium carbonate (OS-LAUREN 500 MG [place]. [place]) 500 MG tablet Take by mouth 2 times daily 180 tablet 3     cholecalciferol (VITAMIN D) 1000 UNIT tablet Take 1 tablet (1,000 Units) by mouth daily 100 tablet 3     fluocinonide (LIDEX) 0.05 % ointment        Probiotic Product (PROBIOTIC PEARLS) CAPS Take by mouth daily       sertraline (ZOLOFT) 100 MG tablet TAKE ONE TABLET BY MOUTH ONCE DAILY (PLEASE MAKE APPOINTMENT FOR FURTHER REFILLS) 60 tablet 0     Allergies   Allergen Reactions     Seasonal Allergies        Mammogram Screening: Patient under age 50, mutual decision reflected in health maintenance.      Pertinent mammograms are reviewed under the imaging tab.  History of abnormal Pap smear: NO - age 30- 65 PAP every 3 years recommended  PAP / HPV Latest Ref Rng & Units 2/2/2018 12/5/2016 12/19/2014   PAP - NIL OTHER-NIL, See Result NIL   HPV 16 DNA NEG:Negative Negative Negative -   HPV 18 DNA NEG:Negative Negative Negative -   OTHER HR HPV NEG:Negative Negative Negative -     Reviewed and updated as needed this visit by clinical staff  Tobacco  Allergies  Meds  Med Hx  Surg Hx  Fam Hx  Soc Hx        Reviewed and updated as needed this visit by Provider        ROS:  CONSTITUTIONAL: NEGATIVE for fever, chills, change in weight  INTEGUMENTARY/SKIN: NEGATIVE for worrisome rashes, moles or lesions  EYES: NEGATIVE for vision changes or irritation  ENT: NEGATIVE for ear, mouth and throat problems  RESP: NEGATIVE for significant cough or SOB  BREAST: NEGATIVE for masses, tenderness or discharge  CV: NEGATIVE for chest pain, palpitations or peripheral edema  GI: NEGATIVE for nausea, abdominal pain, heartburn, or change in bowel  "habits  : NEGATIVE for unusual urinary or vaginal symptoms. No vaginal bleeding.  MUSCULOSKELETAL: NEGATIVE for significant arthralgias or myalgia  NEURO: NEGATIVE for weakness, dizziness or paresthesias  PSYCHIATRIC: NEGATIVE for changes in mood or affect     This document serves as a record of the services and decisions personally performed and made by Giselle Mcdonnell MD. It was created on her behalf by Gayle Gutierrez, a trained medical scribe. The creation of this document is based on the provider's statements to the medical scribe.  Gayle Gutierrez 3:17 PM September 23, 2019    OBJECTIVE:   BP 96/60 (BP Location: Left arm, Patient Position: Chair, Cuff Size: Adult Regular)   Pulse 87   Temp 98.5  F (36.9  C) (Oral)   Ht 1.638 m (5' 4.5\")   Wt 51.7 kg (114 lb)   LMP 05/08/2018 (Exact Date)   SpO2 96%   Breastfeeding? No   BMI 19.27 kg/m    EXAM:  GENERAL APPEARANCE: healthy, alert and no distress  EYES: Eyes grossly normal to inspection, PERRL and conjunctivae and sclerae normal  HENT: ear canals and TM's normal, nose and mouth without ulcers or lesions, oropharynx clear and oral mucous membranes moist  NECK: no adenopathy, no asymmetry, masses, or scars and thyroid normal to palpation  RESP: lungs clear to auscultation - no rales, rhonchi or wheezes  BREAST: normal without masses, tenderness or nipple discharge and no palpable axillary masses or adenopathy  CV: regular rate and rhythm, normal S1 S2, no S3 or S4, no murmur, click or rub, no peripheral edema and peripheral pulses strong  ABDOMEN: soft, nontender, no hepatosplenomegaly, no masses and bowel sounds normal  MS: no musculoskeletal defects are noted and gait is age appropriate without ataxia  SKIN: no suspicious lesions or rashes  NEURO: Normal strength and tone, sensory exam grossly normal, mentation intact and speech normal  PSYCH: mentation appears normal and affect normal/bright    Diagnostic Test Results:  none   ASSESSMENT/PLAN:       ICD-10-CM  "   1. Encounter for routine adult health examination with abnormal findings Z00.01    2. Excessive or frequent menstruation-resolved with hysterectomy 5/21/2018 at [facility] N92.0    3. S/P laparoscopic assisted vaginal hysterectomy (LAVH)- 5/21/2018 - ovaries left in place - cervix removed - secondary to fibroid uterus and menometrorrhagia  Z90.710    4. Vitamin D deficiency E55.9 vitamin D3 (CHOLECALCIFEROL) 1000 units (25 mcg) tablet   5. Anxiety state F41.1 sertraline (ZOLOFT) 100 MG tablet     OFFICE/OUTPT VISIT,EST,LEVL III   6. Allergic contact dermatitis due to dyes L23.4 fluocinonide (LIDEX) 0.05 % external ointment     OFFICE/OUTPT VISIT,EST,LEVL III   7. Visit for screening mammogram Z12.31 MA Screen Bilateral w/Juanjose     Reviewed lipid results from 09/19/2019 with patient. Overall her lipids looked good - only slightly elevated. Lipid levels are not too concerning at this point. To help lower your cholesterol and triglycerides:  fish oil 2000mg twice daily ( if you get fishy burps - keep your fish oil capsules in the freezer)    Exercise for 30-45 minutes 4 days/week and   Try odorless fish oil or flax seed oil capsules - 3000-4000mg by mouth daily, and try 2 or 3 of the below supplements:   an odor-free garlic supplement daily,  coenzyme q-10 @ 100mg by mouth daily, red yeast rice extract 1200mg by mouth twice daily and plant sterols such as Benecol : Three servings per day (1.5 g sitostanol per 1 1/2 teaspoon [8 g] serving) or  Minute Maid  Heart Burger  orange juice or  Rice Dream Heart Burger  beverages: Two 8 fluid ounce servings daily.   Can also do a daily fiber therapy with citrucel or paul fiber (available at [facility]) to help reduce triglycerides and overall cholesterol.  Decreased to 1/2 or even 1/4 over the counter usual dose, if getting diarrhea/gas/bloating.  Or for  daily antioxidant/fiber therapy instead of citrucel or paul fiber : drink warner fresca - 1 scoop of warner seeds (one of natures  "superfoods - available at [facility]) in 8 oz water with juice of 1/4-1/2 of a lime and  A little sweetener ( sugar) or Agave syrup or honey to taste. It makes a slurry w/ the seeds. You chew the seeds a bit as you drink it . Tastes a bit like a sweet -tart.    In addition for your general health, try  vitamin D 1000 IU daily,  folic acid 1mg daily and calcium citrate or carbonate 600mg by mouth   twice daily.  If you get constipation, try taking your calcium with a magnesium supplement.  Also should be taking multivitamin daily such as centrum - 1/2 tablet twice  daily or 1 flintstones vitamin twice daily.   I also recommend a yogurt with active cultures daily or a probiotic supplement 1-2 capsules daily (depending on formulation) daily for good digestive health and immune function.     Viet declines HIV screening today with Dr. Mcdonnell concurrence   Flu shot administered in office today  Referral for mammogram placed.     See pt instructions. Please, call or return to clinic or go to the ER immediately if signs or symptoms worsen or fail to improve as anticipated.     Return in about 1 year (around 9/23/2020) for Physical Exam, Lab Work, depression/anxiety follow up.    COUNSELING:   Reviewed preventive health counseling, as reflected in patient instructions       Regular exercise       Healthy diet/nutrition    Estimated body mass index is 19.27 kg/m  as calculated from the following:    Height as of this encounter: 1.638 m (5' 4.5\").    Weight as of this encounter: 51.7 kg (114 lb).     reports that she has never smoked. She has never used smokeless tobacco.    Counseling Resources:  ATP IV Guidelines  Pooled Cohorts Equation Calculator  Breast Cancer Risk Calculator  FRAX Risk Assessment  ICSI Preventive Guidelines  Dietary Guidelines for Americans, 2010  USDA's MyPlate  ASA Prophylaxis  Lung CA Screening    The information in this document, created by the medical scribe for me, accurately reflects the services I " personally performed and the decisions made by me. I have reviewed and approved this document for accuracy prior to leaving the patient care area.  September 23, 2019 3:42 PM    Giselle Mcdonnell MD  [facility] [place]

## 2019-09-23 NOTE — PATIENT INSTRUCTIONS
To help lower your cholesterol and triglycerides:  fish oil 2000mg twice daily ( if you get fishy burps - keep your fish oil capsules in the freezer)    Exercise for 30-45 minutes 4 days/week and   Try odorless fish oil or flax seed oil capsules - 3000-4000mg by   mouth daily, and try 2 or 3 of the below supplements:   an odor-free garlic supplement daily,  coenzyme q-10 @ 100mg by   mouth daily, red yeast rice extract 1200mg by mouth twice daily and   plant sterols such as Benecol : Three servings per day (1.5 g sitostanol   per 1 1/2 teaspoon [8 g] serving) or  Minute Maid  Heart Buregr  orange   juice or  Rice Dream Heart Burger  beverages: Two 8 fluid ounce   servings daily.     Can also do a daily fiber therapy with citrucel or paul fiber   (available at [facility]) to help reduce triglycerides and overall cholesterol.    Decreased to 1/2 or even 1/4 over the counter usual dose, if getting diarrhea/gas/bloating.    Or for  daily antioxidant/fiber therapy instead of citrucel or paul fiber :   drink warner fresca - 1 scoop of warner seeds (one of natures superfoods -   available at [facility]) in 8 oz water with juice of 1/4-1/2 of a lime and    A little sweetener ( sugar) or Agave syrup or honey to taste. It makes   a slurry w/ the seeds. You chew the seeds a bit as you drink it .   Tastes a bit like a sweet -tart.      In addition for your general health, try  vitamin D 1000 IU daily,    folic acid 1mg daily and calcium citrate or carbonate 600mg by mouth   twice daily.  If you get constipation, try taking your calcium with a   magnesium supplement.  Also should be taking multivitamin daily such   as centrum - 1/2 tablet twice  daily or 1 flintstones vitamin twice daily.     I also recommend a yogurt with active cultures daily or a probiotic   supplement 1-2 capsules daily (depending on formulation) daily for   good digestive health and immune function.               Preventive Health Recommendations  Female Ages 40 to  49    Yearly exam:     See your health care provider every year in order to  1. Review health changes.   2. Discuss preventive care.    3. Review your medicines if your doctor prescribed any.      Get a Pap test every three years (unless you have an abnormal result and your provider advises testing more often).      If you get Pap tests with HPV test, you only need to test every 5 years, unless you have an abnormal result. You do not need a Pap test if your uterus was removed (hysterectomy) and you have not had cancer.      You should be tested each year for STDs (sexually transmitted diseases), if you're at risk.     Ask your doctor if you should have a mammogram.      Have a colonoscopy (test for colon cancer) if someone in your family has had colon cancer or polyps before age 50.       Have a cholesterol test every 5 years.       Have a diabetes test (fasting glucose) after age 45. If you are at risk for diabetes, you should have this test every 3 years.    Shots: Get a flu shot each year. Get a tetanus shot every 10 years.     Nutrition:     Eat at least 5 servings of fruits and vegetables each day.    Eat whole-grain bread, whole-wheat pasta and brown rice instead of white grains and rice.    Get adequate Calcium and Vitamin D.      Lifestyle    Exercise at least 150 minutes a week (an average of 30 minutes a day, 5 days a week). This will help you control your weight and prevent disease.    Limit alcohol to one drink per day.    No smoking.     Wear sunscreen to prevent skin cancer.    See your dentist every six months for an exam and cleaning.               Thank you for choosing [facility]  for your Health Care. It was a pleasure seeing you at your visit today. Please contact us with any questions or concerns you may have.                   Giselle Mcdonnell MD                                  To reach your [facility] care team after hours call:   [phone]    Our  clinic hours are:     Monday- 7:30 am - 7:00 pm                             Tuesday through Friday- 7:30 am - 5:00 pm                                        Saturday- 8:00 am - 12:00 pm                  Phone:  [phone]    Our pharmacy hours are:     Monday  8:00 am to 7:00 pm      Tuesday through Friday 8:00am to 6:00pm                        Saturday - 9:00 am to 1:00 pm      Sunday : Closed.              Phone:  [phone]      There is also information available at our web site:  [email or web address]    If your provider ordered any lab tests and you do not receive the results within 10 business days, please call the clinic.    If you need a medication refill please contact your pharmacy.  Please allow 2 business days for your refill to be completed.    Our clinic offers telephone visits and e visits.  Please ask one of your team members to explain more.      Use [employer] (secure email communication and access to your chart) to send your primary care provider a message or make an appointment. Ask someone on your Team how to sign up for [employer]

## 2019-10-01 ENCOUNTER — ANCILLARY PROCEDURE (OUTPATIENT)
Dept: MAMMOGRAPHY | Facility: CLINIC | Age: 49
End: 2019-10-01
Payer: COMMERCIAL

## 2019-10-01 DIAGNOSIS — Z12.31 VISIT FOR SCREENING MAMMOGRAM: ICD-10-CM

## 2019-10-01 PROCEDURE — 77063 BREAST TOMOSYNTHESIS BI: CPT | Mod: TC

## 2019-10-01 PROCEDURE — 77067 SCR MAMMO BI INCL CAD: CPT | Mod: TC

## 2019-12-01 ENCOUNTER — OFFICE VISIT (OUTPATIENT)
Dept: URGENT CARE | Facility: URGENT CARE | Age: 49
End: 2019-12-01
Payer: COMMERCIAL

## 2019-12-01 VITALS
DIASTOLIC BLOOD PRESSURE: 74 MMHG | HEART RATE: 80 BPM | TEMPERATURE: 97.5 F | SYSTOLIC BLOOD PRESSURE: 94 MMHG | OXYGEN SATURATION: 97 %

## 2019-12-01 DIAGNOSIS — R05.9 COUGH: ICD-10-CM

## 2019-12-01 DIAGNOSIS — J98.8 RESPIRATORY INFECTION: Primary | ICD-10-CM

## 2019-12-01 PROCEDURE — 99213 OFFICE O/P EST LOW 20 MIN: CPT | Performed by: FAMILY MEDICINE

## 2019-12-01 RX ORDER — BENZONATATE 200 MG/1
200 CAPSULE ORAL EVERY 8 HOURS PRN
Qty: 21 CAPSULE | Refills: 0 | Status: SHIPPED | OUTPATIENT
Start: 2019-12-01 | End: 2020-11-18

## 2019-12-01 RX ORDER — AZITHROMYCIN 250 MG/1
TABLET, FILM COATED ORAL
Qty: 6 TABLET | Refills: 0 | Status: SHIPPED | OUTPATIENT
Start: 2019-12-01 | End: 2019-12-09

## 2019-12-01 NOTE — PATIENT INSTRUCTIONS
Start the antibiotic today.  Return to care if any fevers (a temperature of 100.5 or above) develop after you've been on the antibiotic more than 48 hours.

## 2019-12-09 ENCOUNTER — OFFICE VISIT (OUTPATIENT)
Dept: FAMILY MEDICINE | Facility: CLINIC | Age: 49
End: 2019-12-09
Payer: COMMERCIAL

## 2019-12-09 VITALS
WEIGHT: 114 LBS | OXYGEN SATURATION: 98 % | HEIGHT: 65 IN | DIASTOLIC BLOOD PRESSURE: 64 MMHG | TEMPERATURE: 98.4 F | BODY MASS INDEX: 18.99 KG/M2 | SYSTOLIC BLOOD PRESSURE: 100 MMHG | HEART RATE: 89 BPM

## 2019-12-09 DIAGNOSIS — R05.9 COUGH: Primary | ICD-10-CM

## 2019-12-09 DIAGNOSIS — J40 BRONCHITIS: ICD-10-CM

## 2019-12-09 PROCEDURE — 99213 OFFICE O/P EST LOW 20 MIN: CPT | Performed by: NURSE PRACTITIONER

## 2019-12-09 RX ORDER — METHYLPREDNISOLONE 4 MG
TABLET, DOSE PACK ORAL
Qty: 21 TABLET | Refills: 0 | Status: SHIPPED | OUTPATIENT
Start: 2019-12-09 | End: 2020-12-23

## 2019-12-09 RX ORDER — ALBUTEROL SULFATE 90 UG/1
2 AEROSOL, METERED RESPIRATORY (INHALATION) EVERY 4 HOURS PRN
Qty: 18 G | Refills: 1 | Status: SHIPPED | OUTPATIENT
Start: 2019-12-09 | End: 2020-11-18

## 2019-12-09 ASSESSMENT — ANXIETY QUESTIONNAIRES
2. NOT BEING ABLE TO STOP OR CONTROL WORRYING: NOT AT ALL
5. BEING SO RESTLESS THAT IT IS HARD TO SIT STILL: NOT AT ALL
7. FEELING AFRAID AS IF SOMETHING AWFUL MIGHT HAPPEN: NOT AT ALL
3. WORRYING TOO MUCH ABOUT DIFFERENT THINGS: NOT AT ALL
6. BECOMING EASILY ANNOYED OR IRRITABLE: NOT AT ALL
GAD7 TOTAL SCORE: 0
1. FEELING NERVOUS, ANXIOUS, OR ON EDGE: NOT AT ALL

## 2019-12-09 ASSESSMENT — PATIENT HEALTH QUESTIONNAIRE - PHQ9
SUM OF ALL RESPONSES TO PHQ QUESTIONS 1-9: 0
5. POOR APPETITE OR OVEREATING: NOT AT ALL

## 2019-12-09 ASSESSMENT — MIFFLIN-ST. JEOR: SCORE: 1135.04

## 2019-12-09 NOTE — PROGRESS NOTES
"Subjective   Joon Templeton is a 49 year old female who presents to clinic today for the following health issues:    HPI   Acute Illness   Acute illness concerns: Cough  Onset: x5-6 weeks    Fever: no    Chills/Sweats: no    Headache (location?): no    Sinus Pressure:no    Conjunctivitis:  no    Ear Pain: no    Rhinorrhea: YES- clear    Congestion: no    Sore Throat: no     Cough: YES-non-productive - once in a while sputum yellow - when breaths too deep will start coughing. Grandmother had asthma    Wheeze: no    Decreased Appetite: no    Nausea: no    Vomiting: YES- when coughs too much    Diarrhea:  no    Dysuria/Freq.: no    Fatigue/Achiness: no    Sick/Strep Exposure: no    Urgent Care 12/01/2019 - Zithromax   Therapies Tried and outcome: Tessalon - takes at ngt - does think is helping, sleeps better at night, Nyquil, Delsym - minor relief.     Has been reading on internet - states fits perfectly - Cough - [facility].     States she is feeling somewhat better since antibiotics.     Reviewed and updated as needed this visit by provider:  Tobacco  Allergies  Meds  Problems  Med Hx  Surg Hx  Fam Hx       Review of Systems   Constitutional, HEENT, cardiovascular, pulmonary, GI, , musculoskeletal, neuro, skin, endocrine and psych systems are negative, except as otherwise noted in the HPI.       Objective   /64 (BP Location: Right arm, Patient Position: Chair, Cuff Size: Adult Regular)   Pulse 89   Temp 98.4  F (36.9  C) (Oral)   Ht 1.638 m (5' 4.5\")   Wt 51.7 kg (114 lb)   LMP 05/08/2018 (Exact Date)   SpO2 98%   Breastfeeding No   BMI 19.27 kg/m   Body mass index is 19.27 kg/m .  Physical Exam   GENERAL: healthy, alert, well nourished, well hydrated, no distress  EYES: Eyes grossly normal to inspection, extraocular movements - intact, and PERRL  HENT: ear canals- normal; TMs- normal; Nose- normal; Mouth- no ulcers, no lesions  NECK: no tenderness, no adenopathy, no asymmetry, no masses, " no stiffness; thyroid- normal to palpation  RESP: lungs clear to auscultation - no rales, no rhonchi, faint intermittent wheezes, dry hacking cough  CV: regular rates and rhythm, normal S1 S2, no S3 or S4 and no murmur, no click or rub -  ABDOMEN: soft, no tenderness, no  hepatosplenomegaly, no masses, normal bowel sounds  LYMPHATICS: ant. cervical- normal, post. cervical- normal, axillary- normal, supraclavicular- normal, inguinal- normal      Assessment & Plan   Jono was seen today for cough and musculoskeletal problem.    Diagnoses and all orders for this visit:    Cough  Bronchitis  Exam very reassuring today.  X-ray not felt to be needed.  Did discuss possibility of x-ray if symptoms do not improve.  Prescribed steroid Dosepak and albuterol inhaler as needed.  Patient reports she has Tessalon Perles at home to use.  Written education provided.  Encouraged follow-up if symptoms persist or worsen.    -     albuterol (PROAIR HFA/PROVENTIL HFA/VENTOLIN HFA) 108 (90 Base) MCG/ACT inhaler; Inhale 2 puffs into the lungs every 4 hours as needed for shortness of breath / dyspnea or wheezing  -     methylPREDNISolone (MEDROL DOSEPAK) 4 MG tablet therapy pack; Follow Package Directions      See Patient Instructions    Return if symptoms worsen or fail to improve.     Gayle Lee, NANCY-[address] [address]  [email or web address]   Office: [phone]

## 2019-12-09 NOTE — PATIENT INSTRUCTIONS
Patient Education     Viral or Bacterial Bronchitis with Wheezing (Adult)    Bronchitis is an infection of the air passages. It often occurs during a cold and is usually caused by a virus. Symptoms include cough with mucus (phlegm) and low-grade fever. This illness is contagious during the first few days and is spread through the air by coughing and sneezing, or by direct contact (touching the sick person and then touching your own eyes, nose, or mouth).  If there is a lot of inflammation, air flow is restricted. The air passages may also go into spasm, especially if you have asthma. This causes wheezing and difficulty breathing even in people who do not have asthma.  Bronchitis usually lasts 7 to 14 days. The wheezing should improve with treatment during the first week. An inhaler is often prescribed to relax the air passages and stop wheezing. Antibiotics will be prescribed if your doctor thinks there is also a secondary bacterial infection.  Home care    If symptoms are severe, rest at home for the first 2 to 3 days. When you go back to your usual activities, don't let yourself get too tired.    Dont s'moke. Also avoid being exposed to secondhand smoke.    You may use over-the-counter medicine to control fever or pain, unless another medicine was prescribed. Note: If you have chronic liver or kidney disease or have ever had a stomach ulcer or gastrointestinal bleeding, talk with your healthcare provider before using these medicines. Also talk to your provider if you are taking medicine to prevent blood clots.) Aspirin should never be given to anyone younger than 18 years of age who is ill with a viral infection or fever. It may cause severe liver or brain damage.    Your appetite may be poor, so a light diet is fine. Stay well hydrated by drinking 6 to 8 glasses of fluids per day (such as water, soft drinks, sports drinks, juices, tea, or soup). Extra fluids will help loosen secretions in the nose and  lungs.    Over-the-counter cough, cold, and sore-throat medicines will not shorten the length of the illness, but they may be helpful to reduce symptoms. (Note: Don't use decongestants if you have high blood pressure.)    If you were given an inhaler, use it exactly as directed. If you need to use it more often than prescribed, your condition may be worsening. If this happens, contact your healthcare provider.    If prescribed, finish all antibiotic medicine, even if you are feeling better after only a few days.  Follow-up care  Follow up with your healthcare provider, or as advised. If you had an X-ray or ECG (electrocardiogram), a specialist will review it. You will be notified of any new findings that may affect your care.  If you are age 65 or older, or if you have a chronic lung disease or condition that affects your immune system, or you smoke, ask your healthcare provider about getting a pneumococcal vaccine and a yearly flu shot (influenza vaccine).  When to seek medical advice  Call your healthcare provider right away if any of these occur:    Fever of 100.4 F (38 C) or higher, or as directed by your healthcare provider    Coughing up increasing amounts of colored sputum    Weakness, drowsiness, headache, facial pain, ear pain, or a stiff neck  Call 911  Call 911 if any of these occur.    Coughing up blood    Worsening weakness, drowsiness, headache, or stiff neck    Increased wheezing not helped with medication, shortness of breath, or pain with breathing  Date Last Reviewed: 6/1/2018 2000-2018 [employer]. [address] [place] [address], [place] [address]. All rights reserved. This information is not intended as a substitute for professional medical care. Always follow your healthcare professional's instructions.

## 2019-12-10 ASSESSMENT — ANXIETY QUESTIONNAIRES: GAD7 TOTAL SCORE: 0

## 2020-05-03 ENCOUNTER — ANCILLARY PROCEDURE (OUTPATIENT)
Dept: GENERAL RADIOLOGY | Facility: CLINIC | Age: 50
End: 2020-05-03
Attending: FAMILY MEDICINE
Payer: OTHER MISCELLANEOUS

## 2020-05-03 ENCOUNTER — OFFICE VISIT (OUTPATIENT)
Dept: URGENT CARE | Facility: URGENT CARE | Age: 50
End: 2020-05-03
Payer: OTHER MISCELLANEOUS

## 2020-05-03 VITALS
DIASTOLIC BLOOD PRESSURE: 60 MMHG | HEART RATE: 72 BPM | WEIGHT: 114 LBS | OXYGEN SATURATION: 98 % | SYSTOLIC BLOOD PRESSURE: 110 MMHG | TEMPERATURE: 97.6 F | BODY MASS INDEX: 19.27 KG/M2

## 2020-05-03 DIAGNOSIS — M77.8 TENDONITIS OF WRIST, RIGHT: ICD-10-CM

## 2020-05-03 DIAGNOSIS — M25.531 PAIN IN RIGHT WRIST: Primary | ICD-10-CM

## 2020-05-03 PROCEDURE — 99214 OFFICE O/P EST MOD 30 MIN: CPT | Performed by: FAMILY MEDICINE

## 2020-05-03 PROCEDURE — 73110 X-RAY EXAM OF WRIST: CPT | Mod: RT
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
## 2020-05-03 NOTE — LETTER
[facility]  [address]  [facility] [address]  Phone: [phone]  Fax: [phone]    05/03/20    Jono Templeton  [facility] [facility] [address]      To whom it may concern:     Jono Templeton was seen in the clinic for wrist tendonitis   She would need to avoid activities that would worsen her wrist pain for next 2 weeks.   Continue on nsaids with food for 2-3 days and also wear the wrist splint to help with the pain.    Sincerely,      Veronica Rubalcava MD

## 2020-05-04 ENCOUNTER — TELEPHONE (OUTPATIENT)
Dept: FAMILY MEDICINE | Facility: CLINIC | Age: 50
End: 2020-05-04

## 2020-05-04 NOTE — TELEPHONE ENCOUNTER
"  Reason for Call:  Viet will be dropping off a form for \"report of workability\". She strained her wrist yesterday at work and went to urgent care. She now needs her PCP to fill out form.    Viet doesn't have access to fax form. She will stop by and drop it off. When finished please fax to: Attn: Chikis Tony (HR)  [phone]    There is a letter the urgent care doctor wrote up for her restrictions in her chart, she also included it with the form she brought in today.    If visit is needed please call pt to schedule.    Best phone number to reach pt at is: [phone]  Ok to leave a message with medical info? yes    Dori Shafer  Patient Representative            "

## 2020-10-01 DIAGNOSIS — F41.1 ANXIETY STATE: ICD-10-CM

## 2020-10-01 RX ORDER — SERTRALINE HYDROCHLORIDE 100 MG/1
TABLET, FILM COATED ORAL
Qty: 30 TABLET | Refills: 1 | Status: SHIPPED | OUTPATIENT
Start: 2020-10-01 | End: 2020-11-18

## 2020-11-16 ENCOUNTER — HEALTH MAINTENANCE LETTER (OUTPATIENT)
Age: 50
End: 2020-11-16

## 2020-11-18 ENCOUNTER — OFFICE VISIT (OUTPATIENT)
Dept: FAMILY MEDICINE | Facility: CLINIC | Age: 50
End: 2020-11-18
Payer: COMMERCIAL

## 2020-11-18 VITALS
OXYGEN SATURATION: 96 % | HEIGHT: 65 IN | TEMPERATURE: 97.7 F | WEIGHT: 114.6 LBS | HEART RATE: 91 BPM | DIASTOLIC BLOOD PRESSURE: 70 MMHG | BODY MASS INDEX: 19.09 KG/M2 | SYSTOLIC BLOOD PRESSURE: 100 MMHG

## 2020-11-18 DIAGNOSIS — E78.00 ELEVATED LDL CHOLESTEROL LEVEL: ICD-10-CM

## 2020-11-18 DIAGNOSIS — J40 BRONCHITIS: ICD-10-CM

## 2020-11-18 DIAGNOSIS — N92.0 EXCESSIVE OR FREQUENT MENSTRUATION: ICD-10-CM

## 2020-11-18 DIAGNOSIS — N63.15 BREAST LUMP ON RIGHT SIDE AT 12 O'CLOCK POSITION: ICD-10-CM

## 2020-11-18 DIAGNOSIS — L23.4 ALLERGIC CONTACT DERMATITIS DUE TO DYES: ICD-10-CM

## 2020-11-18 DIAGNOSIS — F41.1 ANXIETY STATE: ICD-10-CM

## 2020-11-18 DIAGNOSIS — Z13.6 CARDIOVASCULAR SCREENING; LDL GOAL LESS THAN 160: ICD-10-CM

## 2020-11-18 DIAGNOSIS — Z00.01 ENCOUNTER FOR ROUTINE ADULT MEDICAL EXAM WITH ABNORMAL FINDINGS: Primary | ICD-10-CM

## 2020-11-18 DIAGNOSIS — Z23 NEED FOR SHINGLES VACCINE: ICD-10-CM

## 2020-11-18 DIAGNOSIS — E55.9 VITAMIN D DEFICIENCY: ICD-10-CM

## 2020-11-18 DIAGNOSIS — Z23 NEED FOR TDAP VACCINATION: ICD-10-CM

## 2020-11-18 DIAGNOSIS — Z12.11 SCREEN FOR COLON CANCER: ICD-10-CM

## 2020-11-18 DIAGNOSIS — R05.9 COUGH: ICD-10-CM

## 2020-11-18 DIAGNOSIS — Z12.31 VISIT FOR SCREENING MAMMOGRAM: ICD-10-CM

## 2020-11-18 DIAGNOSIS — Z11.59 ENCOUNTER FOR HEPATITIS C SCREENING TEST FOR LOW RISK PATIENT: ICD-10-CM

## 2020-11-18 PROCEDURE — 90471 IMMUNIZATION ADMIN: CPT | Performed by: FAMILY MEDICINE

## 2020-11-18 PROCEDURE — 99213 OFFICE O/P EST LOW 20 MIN: CPT | Mod: 25 | Performed by: FAMILY MEDICINE

## 2020-11-18 PROCEDURE — 90715 TDAP VACCINE 7 YRS/> IM: CPT | Performed by: FAMILY MEDICINE

## 2020-11-18 PROCEDURE — 99396 PREV VISIT EST AGE 40-64: CPT | Mod: 25 | Performed by: FAMILY MEDICINE

## 2020-11-18 PROCEDURE — 96127 BRIEF EMOTIONAL/BEHAV ASSMT: CPT | Mod: 59 | Performed by: FAMILY MEDICINE

## 2020-11-18 RX ORDER — FLUOCINONIDE 0.5 MG/G
OINTMENT TOPICAL
Qty: 15 G | Refills: 3 | Status: SHIPPED | OUTPATIENT
Start: 2020-11-18

## 2020-11-18 RX ORDER — ALBUTEROL SULFATE 90 UG/1
2 AEROSOL, METERED RESPIRATORY (INHALATION) EVERY 4 HOURS PRN
Qty: 18 G | Refills: 1 | Status: SHIPPED | OUTPATIENT
Start: 2020-11-18 | End: 2023-01-18

## 2020-11-18 RX ORDER — SERTRALINE HYDROCHLORIDE 100 MG/1
100 TABLET, FILM COATED ORAL DAILY
Qty: 90 TABLET | Refills: 1 | Status: SHIPPED | OUTPATIENT
Start: 2020-11-18 | End: 2021-05-12

## 2020-11-18 RX ORDER — BENZONATATE 200 MG/1
200 CAPSULE ORAL EVERY 8 HOURS PRN
Qty: 21 CAPSULE | Refills: 0 | Status: SHIPPED | OUTPATIENT
Start: 2020-11-18 | End: 2020-12-23

## 2020-11-18 ASSESSMENT — ANXIETY QUESTIONNAIRES
2. NOT BEING ABLE TO STOP OR CONTROL WORRYING: NOT AT ALL
5. BEING SO RESTLESS THAT IT IS HARD TO SIT STILL: NOT AT ALL
IF YOU CHECKED OFF ANY PROBLEMS ON THIS QUESTIONNAIRE, HOW DIFFICULT HAVE THESE PROBLEMS MADE IT FOR YOU TO DO YOUR WORK, TAKE CARE OF THINGS AT HOME, OR GET ALONG WITH OTHER PEOPLE: NOT DIFFICULT AT ALL
1. FEELING NERVOUS, ANXIOUS, OR ON EDGE: NOT AT ALL
GAD7 TOTAL SCORE: 0
6. BECOMING EASILY ANNOYED OR IRRITABLE: NOT AT ALL
3. WORRYING TOO MUCH ABOUT DIFFERENT THINGS: NOT AT ALL
7. FEELING AFRAID AS IF SOMETHING AWFUL MIGHT HAPPEN: NOT AT ALL

## 2020-11-18 ASSESSMENT — PATIENT HEALTH QUESTIONNAIRE - PHQ9
5. POOR APPETITE OR OVEREATING: NOT AT ALL
SUM OF ALL RESPONSES TO PHQ QUESTIONS 1-9: 1

## 2020-11-18 ASSESSMENT — MIFFLIN-ST. JEOR: SCORE: 1132.76

## 2020-11-18 NOTE — PATIENT INSTRUCTIONS
[facility]  [address] [place] [id number]  Office: [phone]   Fax:    [phone]     .    Patient Education     What Are Benign Breast Conditions?  Most breast conditions are not cancer (benign), causing no serious harm to you. But all women are at some risk for breast cancer. Your risk increases as you grow older.  Common benign breast changes are covered here. Having one of these conditions does not put you at a higher risk for breast cancer. But all breast changes should be checked by a healthcare provider to know what the change is and whether it should be treated.   Breast infection  Infections of the breast tissue can cause skin redness, warmth, and pain or tenderness. The most common infection is mastitis. This inflammation of the mammary glands can happen during breastfeeding. Mastitis and other breast infections are often treated with antibiotics.  Nipple discharge  Many women can squeeze a tiny amount of a clear or milky discharge from one or both nipples. This discharge may be normal. Other kinds of discharge may be symptoms of a breast condition. A dark or bloody discharge can be caused by a benign growth in a duct near the nipple (an intraductal papilloma). It should be checked by your healthcare provider.    Fibrocystic changes  These benign changes may cause a thickening in the breasts. Breasts may be tender or painful. They may feel more dense in some areas than in others.  These benign changes may cause a thickening and firmness in the breasts. Breasts may be tender or painful. They may feel more dense in some areas than in others. Sometimes solid or fluid-filled lumps (cysts) may also form. Cysts may be smooth, soft or firm, and tender. They may become larger and more tender right before your period. An ultrasound may be done to make sure that a lump is a fluid-filled cyst and not cancer.    Benign breast lumps  Benign breast lumps come in all shapes,  textures, and sizes. A lump of fibrous tissue (fibroadenoma) may be smooth, firm, and rubbery. This type of lump is usually painless and movable. Have any lump checked by your healthcare provider.  Qzzr last reviewed this educational content on 9/1/2017 2000-2020 [employer] [address] [place] [address], [place] [address]. All rights reserved. This information is not intended as a substitute for professional medical care. Always follow your healthcare professional's instructions.           Patient Education     Breast Lump, Uncertain Cause    A lump was found in your breast. Most breast lumps are not cancer. They may be caused by normal changes in the breast tissue due to hormone variations that occur with your menstrual cycle. Some women may form lumps that are painful and tender. Others may form lumps that are painless.  At this time, is not possible to be certain of the cause of your lump without further evaluation. This could include:    Another exam by your healthcare provider or a gynecologist    Imaging tests, such as a mammogram or ultrasound    Biopsy (procedure to remove small tissue samples from the breast lump)  Your healthcare provider will explain any additional testing that is needed. Be sure to get answers to any questions you may have.  Home care  Until a diagnosis is made, you may be advised to do the following:    If you are having breast pain:  ? Take an over-the-counter pain reliever, if directed to by your provider.  ? Wear a well-fitted bra or sports bra for extra support. If you have breast pain at night, try wearing the bra during sleep.  ? Apply a warm compress (towel soaked in warm water) to the breast. You may also use a hot water bottle.    Check your breasts each day. Keep a log of whether the lump seems to be changing in size or tenderness with your period. This can help your healthcare provider make the correct diagnosis.  Follow-up care  Follow up with your healthcare  provider, or as directed. Keep all appointments. Also, prepare for any upcoming tests as directed.  When to seek medical advice  Call your healthcare provider right away if any of these occur:    Fever of 100.4 F (38 C) or higher    Redness or swelling of the breast    Discharge from the nipple    Visible changes in the skin over the nipple or breast    Lump grows larger, feels very hard, or has an irregular shape    New lumps form  [employer] last reviewed this educational content on 3/1/2017    [id number] [employer]. [address] [place] [address]. All rights reserved. This information is not intended as a substitute for professional medical care. Always follow your healthcare professional's instructions.         Thank you so much or choosing [facility]  for your Health Care. It was a pleasure seeing you at your visit today! Please contact us with any questions or concerns you may have.                   Giselle Mcdonnell MD                              To reach your [facility] care team after hours call:   [phone]    PLEASE NOTE OUR HOURS HAVE CHANGED secondary to COVID-19 coronavirus pandemic, as we are trying to minimize patient exposure to the virus,  which is now widespread in the [facility].  These hours may change with very little notice.  We apologize for any inconvenience.       Our current clinic hours are:          Monday- Thursday   7:00am - 6:00pm  in person.      Friday  7:00am- 5:00pm                       Saturday and Sunday : Closed to in person and virtual visits        We have telephone and virtual visit times available between    7:00am - 6pm on Monday-Friday as well.                                                Phone:  [phone]      Our pharmacy hours:   Monday  9:00 am to 6:00 pm      Tuesday through Friday 9:00am to 5:00pm                        Saturday - 9:00 am to 12 noon       Sunday : Closed.               Phone:  [phone]    ###  Please note: at this time we are not accepting any walk-in visits. ###      There is also information available at our web site:  [email or web address]    If your provider ordered any lab tests and you do not receive the results within 10 business days, please call the clinic.    If you need a medication refill please contact your pharmacy.  Please allow 2 business days for your refill to be completed.    Our clinic offers telephone visits and e visits.  Please ask one of your team members to explain more.      Use [employer] (secure email communication and access to your chart) to send your primary care provider a message or make an appointment. Ask someone on your Team how to sign up for [employer].

## 2020-11-18 NOTE — PROGRESS NOTES
"     SUBJECTIVE:   CC: Jono Templeton is an 50 year old woman who presents for preventive health visit and lump in the right breast.     Patient has been advised of split billing requirements and indicates understanding: Yes    Physical Health:    In general, how would you rate your overall physical health? good    Outside of work, how many days during the week do you exercise?1 day/week    Outside of work, approximately how many minutes a day do you exercise?30-45 minutes    If you drink alcohol do you typically have >3 drinks per day or >7 drinks per week? No    Do you usually eat at least 4 servings of fruit and vegetables a day, include whole grains & fiber and avoid regularly eating high fat or \"junk\" foods? Yes    Do you have any problems taking medications regularly? No    Do you have any side effects from medications? none    Needs assistance for the following daily activities: no assistance needed    Which of the following safety concerns are present in your home?  none identified     Hearing impairment: No    In the past 6 months, have you been bothered by leaking of urine? no  Mental Health:    In general, how would you rate your overall mental or emotional health? excellent    PHQ-2 Score:      Do you feel safe in your environment? Yes    Have you ever done Advance Care Planning? (For example, a Health Directive, POLST, or a discussion with a medical provider or your loved ones about your wishes)? Yes, advance care planning is on file.    Fall risk:   no.     Do you have sleep apnea, excessive snoring or daytime drowsiness?: no    Current providers sharing in care for this patient include:   Patient Care Team:  Giselle Mcdonnell MD as PCP - General (Family Practice)  Gayle Lee APRN CNP as Assigned PCP     HPI  Ability to successfully perform activities of daily living: Yes, no assistance needed  Home safety:  none identified   Hearing impairment: no ,     See below for right breast " juventinotha.    Follow up on Depression and Anxiety:   PHQ 9/23/2019 12/9/2019 11/18/2020   PHQ-9 Total Score 1 0 1   Q9: Thoughts of better off dead/self-harm past 2 weeks Not at all Not at all Not at all     MONROE-7 SCORE 5/10/2018 12/9/2019 11/18/2020   Total Score - - -   Total Score 1 0 0     Last PHQ-9 11/18/2020   1.  Little interest or pleasure in doing things 0   2.  Feeling down, depressed, or hopeless 0   3.  Trouble falling or staying asleep, or sleeping too much 0   4.  Feeling tired or having little energy 1   5.  Poor appetite or overeating 0   6.  Feeling bad about yourself 0   7.  Trouble concentrating 0   8.  Moving slowly or restless 0   Q9: Thoughts of better off dead/self-harm past 2 weeks 0   PHQ-9 Total Score 1   Difficulty at work, home, or with people Not difficult at all     MONROE-7  11/18/2020   1. Feeling nervous, anxious, or on edge 0   2. Not being able to stop or control worrying 0   3. Worrying too much about different things 0   4. Trouble relaxing 0   5. Being so restless that it is hard to sit still 0   6. Becoming easily annoyed or irritable 0   7. Feeling afraid, as if something awful might happen 0   MONROE-7 Total Score 0   If you checked any problems, how difficult have they made it for you to do your work, take care of things at home, or get along with other people? Not difficult at all     Would like to stay with same dose of sertraline.       Today's PHQ-2 Score:   PHQ-2 ( 1999 Pfizer) 12/9/2019   Q1: Little interest or pleasure in doing things 0   Q2: Feeling down, depressed or hopeless 0   PHQ-2 Score 0       Abuse: Current or Past (Physical, Sexual or Emotional) - no   Do you feel safe in your environment? Yes        Social History     Tobacco Use     Smoking status: Never Smoker     Smokeless tobacco: Never Used   Substance Use Topics     Alcohol use: Yes     Comment: once a month         Alcohol Use 12/5/2016   Prescreen: >3 drinks/day or >7 drinks/week? The patient does not drink  >3 drinks per day nor >7 drinks per week.       Reviewed orders with patient.  Reviewed health maintenance and updated orders accordingly - Yes  Lab work is in process  Labs reviewed in EPIC  BP Readings from Last 3 Encounters:   20 100/70   20 110/60   19 100/64    Wt Readings from Last 3 Encounters:   20 52 kg (114 lb 9.6 oz)   20 51.7 kg (114 lb)   19 51.7 kg (114 lb)                  Patient Active Problem List   Diagnosis     Esophageal reflux     Anxiety state     Generalized anxiety disorder     CARDIOVASCULAR SCREENING; LDL GOAL LESS THAN 160     Osteopenia- left hip fr.  DEXA scan from [place], [place]     Low back pain     Family history of Sjogren's disease- mother      Family history of rheumatoid arthritis- MGM and mother     Hand joint pain     Symptomatic premature menopausal symptoms - still having menses     Dysmenorrhea     Menorrhagia with regular cycle     Intramural leiomyoma of uterus- 0.9cm in 10/2013     Excessive thirst- at night mostly     Family history of keratoconjunctivitis sicca in Sjogren's syndrome     First degree uterine prolapse     Trace tricuspid regurgitation by prior echocardiogram in       Excessive or frequent menstruation-resolved with hysterectomy 2018 at [facility]     Uterine leiomyoma, unspecified location     S/P laparoscopic assisted vaginal hysterectomy (LAVH)- 2018 - ovaries left in place - cervix removed - secondary to fibroid uterus and menometrorrhagia      Past Surgical History:   Procedure Laterality Date     EYE SURGERY       HYSTERECTOMY, PAP NO LONGER INDICATED  2018     LAPAROSCOPIC ASSISTED HYSTERECTOMY VAGINAL  2018    cervix removed - bilateral ovaries left in place - Dr. Kortney Harris, ob/gyn specialists -secondary to fibroid uterus and menometrorrhagia      [facility] NONSPECIFIC PROCEDURE  2002           Social History     Tobacco Use     Smoking status: Never Smoker     Smokeless  tobacco: Never Used   Substance Use Topics     Alcohol use: Yes     Comment: once a month     Family History   Problem Relation Age of Onset     Connective Tissue Disorder Mother         sjogrens     Autoimmune Disease Mother      Aneurysm Father      Allergies Paternal Grandmother         Asthma         Current Outpatient Medications   Medication Sig Dispense Refill     albuterol (PROAIR HFA/PROVENTIL HFA/VENTOLIN HFA) 108 (90 Base) MCG/ACT inhaler Inhale 2 puffs into the lungs every 4 hours as needed for shortness of breath / dyspnea or wheezing 18 g 1     benzonatate (TESSALON) 200 MG capsule Take 1 capsule (200 mg) by mouth every 8 hours as needed for cough 21 capsule 0     calcium carbonate (OS-LAUREN 500 MG [place]) 500 MG tablet Take by mouth 2 times daily 180 tablet 3     fluocinonide (LIDEX) 0.05 % external ointment Apply small amount to scalp /skin for contact dermatitis rash 15 g 3     methylPREDNISolone (MEDROL DOSEPAK) 4 MG tablet therapy pack Follow Package Directions 21 tablet 0     Probiotic Product (PROBIOTIC PEARLS) CAPS Take by mouth daily       sertraline (ZOLOFT) 100 MG tablet TAKE ONE TABLET BY MOUTH ONCE DAILY 30 tablet 1     vitamin D3 (CHOLECALCIFEROL) 1000 units (25 mcg) tablet Take 2 tablets (2,000 Units) by mouth daily 100 tablet 3     zoster vaccine recombinant adjuvanted (SHINGRIX) injection Inject 0.5 mLs into the muscle once for 1 dose 0.5 mL 1     Allergies   Allergen Reactions     Seasonal Allergies      Recent Labs   Lab Test 09/19/19  0739 05/10/18  1431 02/05/18  0749 02/02/18  1515 12/05/16  0912 11/06/12  0000 11/06/12   A1C  --   --   --   --   --   --  5.7   *  --  90  --  109*   < > 99   HDL 72  --  70  --  46*   < > 71   TRIG 100  --  105  --  171*   < > 109   ALT 20 25  --  21 24   < > 12   CR 0.61 0.79  --  0.80 0.60   < > 0.7   GFRESTIMATED >90 77  --  77 >90  Non  GFR Calc     < > 92   GFRESTBLACK >90 >90  --  >90 >90  African American GFR Calc      "< > 111   POTASSIUM 3.8 4.0  --  4.2 3.7   < > 4.7   TSH 1.66  --   --  1.73 1.82  --  2.9    < > = values in this interval not displayed.              Concern - lump on breast- RIGHT   Onset: X1 WK    Description:   LUMP IN RIGHT  BREAST    Intensity: NA    Progression of Symptoms:  SAME SIZE    Accompanying Signs & Symptoms:  NONE - NO NIPPLE DISCHARGE OR BLEEDING. NO SKIN PUCKERING OR DIMPLING.   NONTENDER.      Previous history of similar problem:   NO    Precipitating factors:   Worsened by: NONE    Alleviating factors:  Improved by: NONE    Therapies Tried and outcome: NONE.      LAST MAMMOGRAM - 10/1/2019 - TOTALLY NORMAL.     Pertinent mammograms are reviewed under the imaging tab.  History of abnormal Pap smear: NO - age 30- 65 PAP every 3 years recommended  PAP / HPV Latest Ref Rng & Units 2014   PAP - NIL OTHER-NIL, See Result NIL   HPV 16 DNA NEG:Negative Negative Negative -   HPV 18 DNA NEG:Negative Negative Negative -   OTHER HR HPV NEG:Negative Negative Negative -     Reviewed and updated as needed this visit by clinical staff  Tobacco  Allergies  Meds  Problems  Med Hx  Surg Hx  Fam Hx          Reviewed and updated as needed this visit by Provider       Surg Hx          OB History    Para Term  AB Living   3 2 2 0 1 2   SAB TAB Ectopic Multiple Live Births   1 0 0 0 2      # Outcome Date GA Lbr Hermes/2nd Weight Sex Delivery Anes PTL Lv   3 Term 02 41w0d 03:00 3.374 kg (7 lb 7 oz) M    ELIZABETH      Birth Comments: no complications      Name: Ed Landry    2 Term 01 39w0d 03:00 3.317 kg (7 lb 5 oz) M    ELIZABETH      Birth Comments: pitocin aug       Name: Bonifacio \"Supa\" Frantz   1 SAB  6w0d             Obstetric Comments   No hx of gestational diabetes   Supa, born in  - has autism - will be living with parents for a while        Review of Systems  CONSTITUTIONAL: NEGATIVE for fever, chills, change in weight  INTEGUMENTARY/SKIN: NEGATIVE for " "worrisome rashes, moles or lesions  EYES: NEGATIVE for vision changes or irritation  ENT: NEGATIVE for ear, mouth and throat problems  RESP: NEGATIVE for significant cough or SOB  BREAST: NEGATIVE for masses, tenderness or discharge  CV: NEGATIVE for chest pain, palpitations or peripheral edema  GI: NEGATIVE for nausea, abdominal pain, heartburn, or change in bowel habits  : NEGATIVE for unusual urinary or vaginal symptoms. No vaginal bleeding.  MUSCULOSKELETAL: NEGATIVE for significant arthralgias or myalgia  NEURO: NEGATIVE for weakness, dizziness or paresthesias  ENDOCRINE: NEGATIVE for temperature intolerance, skin/hair changes  HEME/ALLERGY/IMMUNE: NEGATIVE for bleeding problems  PSYCHIATRIC: NEGATIVE for changes in mood or affect      OBJECTIVE:   /70   Pulse 91   Temp 97.7  F (36.5  C)   Ht 1.638 m (5' 4.5\")   Wt 52 kg (114 lb 9.6 oz)   LMP 05/08/2018 (Exact Date)   SpO2 96%   BMI 19.37 kg/m    Physical Exam  GENERAL APPEARANCE: healthy, alert and no distress  EYES: Eyes grossly normal to inspection, PERRL and conjunctivae and sclerae normal  HENT: ear canals and TM's normal, nose and mouth without ulcers or lesions, oropharynx clear and oral mucous membranes moist  NECK: no adenopathy, no asymmetry, masses, or scars and thyroid normal to palpation  RESP: lungs clear to auscultation - no rales, rhonchi or wheezes  BREAST: normal without suspicious masses, tenderness or nipple discharge and no palpable axillary masses or adenopathy  BREAST: fibrocystic changes bilaterally from 9:00 to 1:00 in the right breast - a little more prominent than the left breast from 12:00-3:00.    CV: regular rate and rhythm, normal S1 S2, no S3 or S4, no murmur, click or rub, no peripheral edema and peripheral pulses strong  ABDOMEN: soft, nontender, no hepatosplenomegaly, no masses and bowel sounds normal    Pt is s/p hysterectomy in 2018.   MS: no musculoskeletal defects are noted and gait is age appropriate " "without ataxia  SKIN: no suspicious lesions or rashes  NEURO: Normal strength and tone, sensory exam grossly normal, mentation intact and speech normal  PSYCH: mentation appears normal and affect normal/bright    Diagnostic Test Results:  Labs reviewed in Epic    ASSESSMENT/PLAN:       ICD-10-CM    1. Encounter for routine adult medical exam with abnormal findings  Z00.01    2. Breast lump on right side at 12 o'clock position  N63.15 MA Diagnostic Right w/Juanjose     US Breast Right Complete 4 Quadrants   3. Visit for screening mammogram  Z12.31 MA Diagnostic Left w/Juanjose   4. Screen for colon cancer  Z12.11 GASTROENTEROLOGY ADULT REF PROCEDURE ONLY   5. CARDIOVASCULAR SCREENING; LDL GOAL LESS THAN 160  Z13.6 CBC with platelets     Comprehensive metabolic panel     Lipid panel reflex to direct LDL Fasting     TSH with free T4 reflex   6. Excessive or frequent menstruation-resolved with hysterectomy 5/21/2018 at [facility]  N92.0    7. Need for shingles vaccine  Z23 zoster vaccine recombinant adjuvanted (SHINGRIX) injection   8. Need for Tdap vaccination  Z23 TDAP VACCINE (Adacel, Boostrix)  [[id number]]     ADMIN 1st VACCINE   9. Bronchitis  J40 albuterol (PROAIR HFA/PROVENTIL HFA/VENTOLIN HFA) 108 (90 Base) MCG/ACT inhaler   10. Cough  R05 benzonatate (TESSALON) 200 MG capsule   11. Allergic contact dermatitis due to dyes  L23.4 fluocinonide (LIDEX) 0.05 % external ointment   12. Anxiety state  F41.1 sertraline (ZOLOFT) 100 MG tablet   13. Encounter for hepatitis C screening test for low risk patient  Z11.59 Hepatitis C antibody   14. Vitamin D deficiency  E55.9 25 Hydroxyvitamin D2 and D3       Patient has been advised of split billing requirements and indicates understanding: Yes  COUNSELING:  Reviewed preventive health counseling, as reflected in patient instructions    Estimated body mass index is 19.37 kg/m  as calculated from the following:    Height as of this encounter: 1.638 m (5' 4.5\").    Weight as of this " encounter: 52 kg (114 lb 9.6 oz).    Please, call or return to clinic or go to the ER immediately if signs or symptoms worsen or fail to improve as anticipated.     She reports that she has never smoked. She has never used smokeless tobacco.      Counseling Resources:  ATP IV Guidelines  Pooled Cohorts Equation Calculator  Breast Cancer Risk Calculator  BRCA-Related Cancer Risk Assessment: FHS-7 Tool  FRAX Risk Assessment  ICSI Preventive Guidelines  Dietary Guidelines for Americans, 2010  USDA's MyPlate  ASA Prophylaxis  Lung CA Screening    Giselle Mcdonnell MD  [facility]

## 2020-11-19 ASSESSMENT — ANXIETY QUESTIONNAIRES: GAD7 TOTAL SCORE: 0

## 2020-11-19 NOTE — PROGRESS NOTES
Walked Rx for albuterol  [facility].    Valarie Bustillo                Valarie Bustillo

## 2020-11-24 DIAGNOSIS — Z13.6 CARDIOVASCULAR SCREENING; LDL GOAL LESS THAN 160: ICD-10-CM

## 2020-11-24 DIAGNOSIS — E55.9 VITAMIN D DEFICIENCY: ICD-10-CM

## 2020-11-24 DIAGNOSIS — Z11.59 ENCOUNTER FOR HEPATITIS C SCREENING TEST FOR LOW RISK PATIENT: ICD-10-CM

## 2020-11-24 LAB
ERYTHROCYTE [DISTWIDTH] IN BLOOD BY AUTOMATED COUNT: 12.2 % (ref 10–15)
HCT VFR BLD AUTO: 45.1 % (ref 35–47)
HGB BLD-MCNC: 15.5 G/DL (ref 11.7–15.7)
MCH RBC QN AUTO: 30.8 PG (ref 26.5–33)
MCHC RBC AUTO-ENTMCNC: 34.4 G/DL (ref 31.5–36.5)
MCV RBC AUTO: 90 FL (ref 78–100)
PLATELET # BLD AUTO: 251 10E9/L (ref 150–450)
RBC # BLD AUTO: 5.04 10E12/L (ref 3.8–5.2)
WBC # BLD AUTO: 5.6 10E9/L (ref 4–11)

## 2020-11-24 PROCEDURE — 84443 ASSAY THYROID STIM HORMONE: CPT | Performed by: FAMILY MEDICINE

## 2020-11-24 PROCEDURE — 82306 VITAMIN D 25 HYDROXY: CPT | Performed by: FAMILY MEDICINE

## 2020-11-24 PROCEDURE — 80053 COMPREHEN METABOLIC PANEL: CPT | Performed by: FAMILY MEDICINE

## 2020-11-24 PROCEDURE — 86803 HEPATITIS C AB TEST: CPT | Performed by: FAMILY MEDICINE

## 2020-11-24 PROCEDURE — 36415 COLL VENOUS BLD VENIPUNCTURE: CPT | Performed by: FAMILY MEDICINE

## 2020-11-24 PROCEDURE — 85027 COMPLETE CBC AUTOMATED: CPT | Performed by: FAMILY MEDICINE

## 2020-11-24 PROCEDURE — 80061 LIPID PANEL: CPT | Performed by: FAMILY MEDICINE

## 2020-11-25 ENCOUNTER — HOSPITAL ENCOUNTER (OUTPATIENT)
Dept: ULTRASOUND IMAGING | Facility: CLINIC | Age: 50
End: 2020-11-25
Attending: FAMILY MEDICINE
Payer: COMMERCIAL

## 2020-11-25 ENCOUNTER — HOSPITAL ENCOUNTER (OUTPATIENT)
Dept: MAMMOGRAPHY | Facility: CLINIC | Age: 50
End: 2020-11-25
Attending: FAMILY MEDICINE
Payer: COMMERCIAL

## 2020-11-25 DIAGNOSIS — N63.15 BREAST LUMP ON RIGHT SIDE AT 12 O'CLOCK POSITION: ICD-10-CM

## 2020-11-25 DIAGNOSIS — Z11.59 ENCOUNTER FOR SCREENING FOR OTHER VIRAL DISEASES: Primary | ICD-10-CM

## 2020-11-25 PROBLEM — L23.4 ALLERGIC CONTACT DERMATITIS DUE TO DYES: Status: ACTIVE | Noted: 2020-11-25

## 2020-11-25 PROBLEM — J40 BRONCHITIS: Status: ACTIVE | Noted: 2020-11-25

## 2020-11-25 PROBLEM — E78.00 ELEVATED LDL CHOLESTEROL LEVEL: Status: ACTIVE | Noted: 2020-11-25

## 2020-11-25 LAB
ALBUMIN SERPL-MCNC: 4.1 G/DL (ref 3.4–5)
ALP SERPL-CCNC: 63 U/L (ref 40–150)
ALT SERPL W P-5'-P-CCNC: 22 U/L (ref 0–50)
ANION GAP SERPL CALCULATED.3IONS-SCNC: 4 MMOL/L (ref 3–14)
AST SERPL W P-5'-P-CCNC: 18 U/L (ref 0–45)
BILIRUB SERPL-MCNC: 0.5 MG/DL (ref 0.2–1.3)
BUN SERPL-MCNC: 11 MG/DL (ref 7–30)
CALCIUM SERPL-MCNC: 9.3 MG/DL (ref 8.5–10.1)
CHLORIDE SERPL-SCNC: 110 MMOL/L (ref 94–109)
CHOLEST SERPL-MCNC: 234 MG/DL
CO2 SERPL-SCNC: 24 MMOL/L (ref 20–32)
CREAT SERPL-MCNC: 0.72 MG/DL (ref 0.52–1.04)
GFR SERPL CREATININE-BSD FRML MDRD: >90 ML/MIN/{1.73_M2}
GLUCOSE SERPL-MCNC: 94 MG/DL (ref 70–99)
HCV AB SERPL QL IA: NONREACTIVE
HDLC SERPL-MCNC: 72 MG/DL
LDLC SERPL CALC-MCNC: 144 MG/DL
NONHDLC SERPL-MCNC: 162 MG/DL
POTASSIUM SERPL-SCNC: 4.5 MMOL/L (ref 3.4–5.3)
PROT SERPL-MCNC: 7.7 G/DL (ref 6.8–8.8)
SODIUM SERPL-SCNC: 138 MMOL/L (ref 133–144)
TRIGL SERPL-MCNC: 88 MG/DL
TSH SERPL DL<=0.005 MIU/L-ACNC: 2.14 MU/L (ref 0.4–4)

## 2020-11-25 PROCEDURE — 77066 DX MAMMO INCL CAD BI: CPT

## 2020-11-25 PROCEDURE — 76642 ULTRASOUND BREAST LIMITED: CPT | Mod: RT

## 2020-11-29 DIAGNOSIS — Z11.59 ENCOUNTER FOR SCREENING FOR OTHER VIRAL DISEASES: ICD-10-CM

## 2020-11-29 PROCEDURE — U0003 INFECTIOUS AGENT DETECTION BY NUCLEIC ACID (DNA OR RNA); SEVERE ACUTE RESPIRATORY SYNDROME CORONAVIRUS 2 (SARS-COV-2) (CORONAVIRUS DISEASE [COVID-19]), AMPLIFIED PROBE TECHNIQUE, MAKING USE OF HIGH THROUGHPUT TECHNOLOGIES AS DESCRIBED BY CMS-2020-01-R: HCPCS | Performed by: FAMILY MEDICINE

## 2020-11-30 LAB
SARS-COV-2 RNA SPEC QL NAA+PROBE: NOT DETECTED
SPECIMEN SOURCE: NORMAL

## 2020-12-01 LAB
DEPRECATED CALCIDIOL+CALCIFEROL SERPL-MC: <93 UG/L (ref 20–75)
VITAMIN D2 SERPL-MCNC: <5 UG/L
VITAMIN D3 SERPL-MCNC: 88 UG/L

## 2020-12-02 ENCOUNTER — HOSPITAL ENCOUNTER (OUTPATIENT)
Dept: MAMMOGRAPHY | Facility: CLINIC | Age: 50
End: 2020-12-02
Attending: FAMILY MEDICINE
Payer: COMMERCIAL

## 2020-12-02 ENCOUNTER — HOSPITAL ENCOUNTER (OUTPATIENT)
Dept: ULTRASOUND IMAGING | Facility: CLINIC | Age: 50
End: 2020-12-02
Attending: FAMILY MEDICINE
Payer: COMMERCIAL

## 2020-12-02 DIAGNOSIS — N63.15 BREAST LUMP ON RIGHT SIDE AT 12 O'CLOCK POSITION: ICD-10-CM

## 2020-12-02 PROCEDURE — 999N001020 HC STATISTIC H-SEND OUTS PREP: Performed by: FAMILY MEDICINE

## 2020-12-02 PROCEDURE — 88377 M/PHMTRC ALYS ISHQUANT/SEMIQ: CPT | Mod: 26 | Performed by: MEDICAL GENETICS

## 2020-12-02 PROCEDURE — 88377 M/PHMTRC ALYS ISHQUANT/SEMIQ: CPT | Mod: TC | Performed by: PATHOLOGY

## 2020-12-02 PROCEDURE — 999N000065 MA POST PROCEDURE RIGHT

## 2020-12-02 PROCEDURE — 88360 TUMOR IMMUNOHISTOCHEM/MANUAL: CPT | Mod: 26 | Performed by: PATHOLOGY

## 2020-12-02 PROCEDURE — 88305 TISSUE EXAM BY PATHOLOGIST: CPT | Mod: TC | Performed by: FAMILY MEDICINE

## 2020-12-02 PROCEDURE — 88360 TUMOR IMMUNOHISTOCHEM/MANUAL: CPT | Mod: TC | Performed by: FAMILY MEDICINE

## 2020-12-02 PROCEDURE — 272N000031 US BREAST BIOPSY CORE NEEDLE RIGHT

## 2020-12-02 PROCEDURE — 88305 TISSUE EXAM BY PATHOLOGIST: CPT | Mod: 26 | Performed by: PATHOLOGY

## 2020-12-02 PROCEDURE — 250N000009 HC RX 250: Performed by: RADIOLOGY

## 2020-12-02 PROCEDURE — 999N001019 HC STATISTIC H-FISH PROCESS B/S: Performed by: FAMILY MEDICINE

## 2020-12-02 RX ADMIN — LIDOCAINE HYDROCHLORIDE 5 ML: 10 INJECTION, SOLUTION EPIDURAL; INFILTRATION; INTRACAUDAL; PERINEURAL at 11:32

## 2020-12-02 NOTE — DISCHARGE INSTRUCTIONS
Page 1 of 1  For informational purposes only. Not to replace the advice of your health care provider. Copyright   2010 [facility]. All rights reserved. [employer] [id number] - REV 02/16.  After Your Breast Biopsy   Bleeding or bruising  Slight bruising is normal. If you bleed through the bandage, put direct pressure on the breast for 10 minutes.   If the breast begins to swell, or you have a lot of bleeding after 10 minutes of pressure, call the doctor who ordered your exam. Or, go to the emergency room.   Bandages  Keep your bandage in place until tomorrow morning. Do not get it wet.   If you have small pieces of tape on the skin, leave them in place. They will fall off on their own, or you can remove them after 5 days.   Activity  You may shower the morning after the exam. No heavy activity (lifting, vacuuming) on the day of your exam. You may go back to normal activity the next day, unless you had a lot of bleeding or pain.  Discomfort  You may take Tylenol (acetaminophen) today for pain. Tomorrow, you may take an anti-inflammatory medicine (aspirin, ibuprofen, Motrin, Aleve, Advil), unless your doctor tells you not to.  Wear your bra overnight to support the breast. You may also use an ice pack: Place it over the area for 15-20 minutes several times a day.  Infection  Infection is rare. Symptoms include fever, redness, increasing pain and fluid draining from the biopsy site. If you have any of these symptoms, please call the doctor who ordered your exam.  Results  Results may take up to 5 business days. A nurse or doctor from the Breast Center will call with your results. We will also send the results to the doctor who ordered your biopsy.  If you have not heard your results in 5 days, please call the Breast Center.   Other instructions  ______________________________________________________________________________________________________________________________  Call your doctor if:    You have  bleeding that lasts more than 10 minutes.    You have pain that cannot be controlled.   You have signs of infection (fever, redness, drainage or other signs).   You have not received your results within 5 days.    Please call the Breast Center nurse navigator at [phone] if you have questions or concerns about your biopsy.

## 2020-12-03 ENCOUNTER — TELEPHONE (OUTPATIENT)
Dept: MAMMOGRAPHY | Facility: CLINIC | Age: 50
End: 2020-12-03

## 2020-12-03 NOTE — TELEPHONE ENCOUNTER
"  Pathology report reviewed with our breast radiologist Dr Marie, who confirmed the recent breast imaging is concordant with the final surgical pathology results below.    I phoned MsNahid Templeton, confirmed her full name, date of birth, and notified patient of Ultrasound Guided right Breast Biopsy results showing Patient Name: JAYASHREE TEMPLETON   MR#: [id number]   Specimen #: [id number]   Collected: 12/2/2020   Received: 12/2/2020   Reported: 12/3/2020 15:00   Ordering Phy(s): ANNELIESE FUENTES   Additional Phy(s): MARSHALL MARIE     For improved result formatting, select 'View Enhanced Report Format' under    Linked Documents section.     SPECIMEN(S):   Right ultrasound guided breast needle biopsy, 12:00, 2.0cm from nipple     FINAL DIAGNOSIS:   Breast, right/12:00 - 2 cm from nipple, ultrasound-guided core biopsy:   - Invasive ductal carcinoma, histologic grade 2.   - Estrogen and progesterone receptors by immunohistochemical stains and   HER2 by FISH ordered.  See addendum   and separate report for results.     Electronically signed out by:     Herbert Cool M.D.     CLINICAL HISTORY:   Right breast: 1.5 cm mass.  Intermediate level of suspicion.     GROSS:   The specimen is received in formalin, labeled with the patient's name and   date of birth, and designated \"right   breast ultrasound core biopsy, 12:00, 2.0 cm from nipple, 1.5 cm size\". It    consists of 3 yellow-tan fibrofatty   breast cores, ranging from 1.0-1.7 cm in length and averaging 0.1 cm in   diameter.  The specimen is wrapped and   entirely submitted in one cassette. (Dictated by: MOSHE Zhang([facility]) 12/2/2020 02:40 PM)     MICROSCOPIC:   Microscopic examination was performed.  Intradepartmental consultation was    obtained.  Correlation with   clinical and radiologic findings is recommended.     The technical component of this testing was completed at the [facility], " with the professional component performed    at the [facility]   [facility]atory, [address], [place]  [address]   ([phone])     CPT Codes:   A: 18896-EN3, 07625-GH, 51086-NZ, HFISH, SOH, SOH     COLLECTION SITE:   Client: [facility]   Location: [facility] (R) .      Patient states no problems with biopsy site.  Recommended follow up is surgical consult and MRI  Surgical Consult has been arranged with Dr Hart on 12/7/20 at 130pm.   Patient has directions and phone numbers.    Questions were answered and I explained my role as Breast Care Nurse Coordinator in assisting her with appointments, resources and social support.  New diagnosis information packet will be available for patient at surgical consult.  I will follow up with the patient. She has my phone number if she has further questions.  Patient verbalized understanding and agrees with the plan of care.  Ordering provider- Dr Mcdonnell has been notified of the results, recommendations for follow up and scheduled surgical consultation.  I will forward this note, along with the pathology results.      Makayla Gleason RN CBCN  Breast Care Nurse Coordinator  [facility]  [phone]

## 2020-12-07 ENCOUNTER — OFFICE VISIT (OUTPATIENT)
Dept: SURGERY | Facility: CLINIC | Age: 50
End: 2020-12-07
Payer: COMMERCIAL

## 2020-12-07 VITALS — WEIGHT: 114 LBS | HEIGHT: 64 IN | BODY MASS INDEX: 19.46 KG/M2 | RESPIRATION RATE: 16 BRPM

## 2020-12-07 DIAGNOSIS — C50.111 MALIGNANT NEOPLASM OF CENTRAL PORTION OF RIGHT FEMALE BREAST, UNSPECIFIED ESTROGEN RECEPTOR STATUS (H): Primary | ICD-10-CM

## 2020-12-07 LAB — COPATH REPORT: NORMAL

## 2020-12-07 PROCEDURE — 99244 OFF/OP CNSLTJ NEW/EST MOD 40: CPT | Performed by: SURGERY

## 2020-12-07 ASSESSMENT — MIFFLIN-ST. JEOR: SCORE: 1122.1

## 2020-12-07 NOTE — TELEPHONE ENCOUNTER
"Left message for patient \"just with support\" on her cell phone.  Looks like pt has appt with Dr. Omid Hart, surgery right now.  ---RAY   "
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
## 2020-12-07 NOTE — PATIENT INSTRUCTIONS
BILATERAL BREAST MRI    Date: 12-8-20  Time: 12:30 pm     Location: [place] [address] [address]  [address]        Please check in at 12:00 pm
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
## 2020-12-07 NOTE — PROGRESS NOTES
REVIEW OF SYSTEMS:    Constitutional: Negative    Cardiovascular: Negative    Respiratory: Negative    Endocrine:  Negative    Hematologic: Negative    Gastrointestinal: Negative    Genitourinary: Negative    Musculoskeletal: Negative      Integumentary / Breast : Negative    Neurologic: Negative                                                                   Breast Patients      BREAST PATIENTS (FEMALE)    At what age did your periods begin? 13    What was the date of your last menstrual period? 2019    Have you begun menopause? Yes  Age Menopause began:  50    Are you using hormone replacement therapy?  No    Number of full-term pregnancies: 2    Did you nurse your children? Yes    Are you pregnant now? No    Do you have breast implants? No         BREAST PATIENTS (ALL)    Have you had a previous breast biopsy? Yes  Side:   Date: 12/2/2020    Have you had previous Breast Cancer? No

## 2020-12-08 ENCOUNTER — HOSPITAL ENCOUNTER (OUTPATIENT)
Dept: MRI IMAGING | Facility: CLINIC | Age: 50
Discharge: HOME | End: 2020-12-08
Attending: SURGERY | Admitting: SURGERY
Payer: COMMERCIAL

## 2020-12-08 DIAGNOSIS — C50.111 MALIGNANT NEOPLASM OF CENTRAL PORTION OF RIGHT FEMALE BREAST, UNSPECIFIED ESTROGEN RECEPTOR STATUS (H): ICD-10-CM

## 2020-12-08 LAB — COPATH REPORT: NORMAL

## 2020-12-08 PROCEDURE — 77049 MRI BREAST C-+ W/CAD BI: CPT

## 2020-12-08 PROCEDURE — A9585 GADOBUTROL INJECTION: HCPCS | Performed by: SURGERY

## 2020-12-08 PROCEDURE — 255N000002 HC RX 255 OP 636: Performed by: SURGERY

## 2020-12-08 RX ORDER — GADOBUTROL 604.72 MG/ML
5 INJECTION INTRAVENOUS ONCE
Status: COMPLETED | OUTPATIENT
Start: 2020-12-08 | End: 2020-12-08

## 2020-12-08 RX ADMIN — GADOBUTROL 5 ML: 604.72 INJECTION INTRAVENOUS at 12:40

## 2020-12-18 ENCOUNTER — PREP FOR PROCEDURE (OUTPATIENT)
Dept: SURGERY | Facility: CLINIC | Age: 50
End: 2020-12-18

## 2020-12-18 ENCOUNTER — TELEPHONE (OUTPATIENT)
Dept: SURGERY | Facility: CLINIC | Age: 50
End: 2020-12-18

## 2020-12-18 DIAGNOSIS — C50.911 MALIGNANT NEOPLASM OF RIGHT FEMALE BREAST, UNSPECIFIED ESTROGEN RECEPTOR STATUS, UNSPECIFIED SITE OF BREAST (H): Primary | ICD-10-CM

## 2020-12-18 DIAGNOSIS — Z11.59 ENCOUNTER FOR SCREENING FOR OTHER VIRAL DISEASES: Primary | ICD-10-CM

## 2020-12-18 NOTE — TELEPHONE ENCOUNTER
Type of surgery: RIGHT BREAST WIRE LOCALIZED LUMPECTOMY, RIGHT SENTINEL NODE BIOPSY, POSSIBLE RIGHT AXILLARY NODE DISSECTION   Location of surgery: [facility]  Date and time of surgery: 12-29-20, 11:00 AM   Surgeon: DR. VERMA   Pre-Op Appt Date: PATIENT TO SCHEDULE   Post-Op Appt Date: PATIENT TO SCHEDULE    Packet sent out: Yes  Pre-cert/Authorization completed:  Not Applicable  Date: 12-18-20       RIGHT BREAST WIRE LOCALIZED LUMPECTOMY, RIGHT SENTINEL NODE BIOPSY, POSSIBLE RIGHT AXILLARY NODE DISSECTION   GENERAL   PT INST TO HAVE H&P WITH DR. FUENTES  90 MINS REQ   PA ASSIST DFB   ALW   R wire loc at 9 am  alw  R Sn inj at 10 am  alw

## 2020-12-18 NOTE — LETTER
[facility]    [address], [address]  [place], [place] [address]  Phone [phone]  Fax [phone] [address], [address]  [place] [address]  Phone [phone]  Fax [phone]    [email or web address]           You have been scheduled for a COVID-19 testing appointment at [facility].    12-26-20 at 1:00 PM     The clinic is located at:   [address] [place] [address]        Please wear a mask or face covering to the testing site.  Have your ID out and ready to show staff when you arrive.    Following your testing, you will be required to self-isolate until your surgery.  If you need a note for your employer due to this, please let us know.

## 2020-12-23 RX ORDER — CETIRIZINE HYDROCHLORIDE 10 MG/1
10 TABLET ORAL DAILY
COMMUNITY
End: 2022-10-05

## 2020-12-24 ENCOUNTER — OFFICE VISIT (OUTPATIENT)
Dept: FAMILY MEDICINE | Facility: CLINIC | Age: 50
End: 2020-12-24
Payer: COMMERCIAL

## 2020-12-24 VITALS
OXYGEN SATURATION: 97 % | WEIGHT: 113.8 LBS | HEIGHT: 65 IN | TEMPERATURE: 97.8 F | SYSTOLIC BLOOD PRESSURE: 100 MMHG | BODY MASS INDEX: 18.96 KG/M2 | HEART RATE: 91 BPM | DIASTOLIC BLOOD PRESSURE: 70 MMHG

## 2020-12-24 DIAGNOSIS — Z01.818 PREOP GENERAL PHYSICAL EXAM: Primary | ICD-10-CM

## 2020-12-24 DIAGNOSIS — C50.911 INVASIVE DUCTAL CARCINOMA OF BREAST, RIGHT (H): ICD-10-CM

## 2020-12-24 DIAGNOSIS — Z90.710 S/P LAPAROSCOPIC ASSISTED VAGINAL HYSTERECTOMY (LAVH): ICD-10-CM

## 2020-12-24 LAB — HGB BLD-MCNC: 15.7 G/DL (ref 11.7–15.7)

## 2020-12-24 PROCEDURE — 82565 ASSAY OF CREATININE: CPT | Performed by: FAMILY MEDICINE

## 2020-12-24 PROCEDURE — 99214 OFFICE O/P EST MOD 30 MIN: CPT | Performed by: FAMILY MEDICINE

## 2020-12-24 PROCEDURE — 36415 COLL VENOUS BLD VENIPUNCTURE: CPT | Performed by: FAMILY MEDICINE

## 2020-12-24 PROCEDURE — 93000 ELECTROCARDIOGRAM COMPLETE: CPT | Performed by: FAMILY MEDICINE

## 2020-12-24 PROCEDURE — 85018 HEMOGLOBIN: CPT | Performed by: FAMILY MEDICINE

## 2020-12-24 SDOH — ECONOMIC STABILITY: TRANSPORTATION INSECURITY
IN THE PAST 12 MONTHS, HAS THE LACK OF TRANSPORTATION KEPT YOU FROM MEDICAL APPOINTMENTS OR FROM GETTING MEDICATIONS?: NOT ASKED

## 2020-12-24 SDOH — ECONOMIC STABILITY: FOOD INSECURITY: WITHIN THE PAST 12 MONTHS, YOU WORRIED THAT YOUR FOOD WOULD RUN OUT BEFORE YOU GOT MONEY TO BUY MORE.: NOT ASKED

## 2020-12-24 SDOH — ECONOMIC STABILITY: TRANSPORTATION INSECURITY
IN THE PAST 12 MONTHS, HAS LACK OF TRANSPORTATION KEPT YOU FROM MEETINGS, WORK, OR FROM GETTING THINGS NEEDED FOR DAILY LIVING?: NOT ASKED

## 2020-12-24 SDOH — ECONOMIC STABILITY: INCOME INSECURITY: HOW HARD IS IT FOR YOU TO PAY FOR THE VERY BASICS LIKE FOOD, HOUSING, MEDICAL CARE, AND HEATING?: NOT ASKED

## 2020-12-24 SDOH — ECONOMIC STABILITY: FOOD INSECURITY: WITHIN THE PAST 12 MONTHS, THE FOOD YOU BOUGHT JUST DIDN'T LAST AND YOU DIDN'T HAVE MONEY TO GET MORE.: NOT ASKED

## 2020-12-24 ASSESSMENT — MIFFLIN-ST. JEOR: SCORE: 1129.13

## 2020-12-24 NOTE — LETTER
[facility] [address] [id number] [phone]       December 24, 2020    Jono Templeton  [facility] [facility] [address]    To Whom it May Concern:    The above patient is unable to attend work from now until 2/8/2021  due to a medical issue. Please contact me with questions or concerns.      Sincerely,       Giselle Mcdonnell M.D.
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
## 2020-12-24 NOTE — PATIENT INSTRUCTIONS
"  Preparing for Your Surgery  Getting started  A surgery nurse will call you to review your health history and instructions. They will give you an arrival time based on your scheduled surgery time.  Please be ready to share the following:    Your doctor's clinic name and phone number    Your medical, surgical and anesthesia history    A list of allergies and sensitivities    A list of medicines, including herbal treatments and over-the-counter drugs    Whether the patient has a legal guardian (ask how to send us the papers in advance)  If your child is having surgery, please ask for a copy of Preparing for Your Child's Surgery.    Preparing for surgery    Within 30 days of surgery: Have an exam at your family clinic (primary care clinic), or go to a pre-operative clinic. This exam is called a \"History and Physical,\" or H&P.    At your H&P exam, talk to your care team about all medicines you take. If you need to stop any medicines before surgery, ask when to start taking them again.  ? We do this for your safety. Many medicines can make you bleed too much during surgery. Some change how well surgery (anesthesia) drugs work.    Call your insurance company to see what it will and won't pay for. Ask if they need to pre-approve the surgery. (If no insurance, call [phone].)    Call your surgeon's clinic if there's any change in your health. This includes signs of a cold or flu (sore throat, runny nose, cough, rash, fever). It also includes a scrape or scratch near the surgery site.    If you have questions on the day of surgery, call your surgery center.  Eating and drinking guidelines  For your safety: Unless your surgeon tells you otherwise, follow the guidelines below.    Eat and drink as usual until 8 hours before surgery. After that, no food or milk.    Drink clear liquids until 2 hours before surgery. These are liquids you can see through, like water, Gatorade and Propel Water. You may also have black coffee " and tea (no cream or milk).    Nothing by mouth within 2 hours of surgery. This includes gum, candy and breath mints.    Stop alcohol the midnight before surgery.    If your family clinic tells you to take medicine on the morning of surgery, it's okay to take it with a sip of water.  Preventing infection    Shower or bathe the night before and morning of your surgery. Follow the instructions your clinic gave you. (If no instructions, use regular soap.)    Don't shave or clip hair near your surgery site. This can lead to skin infection.    Don't smoke the morning of surgery. Smoking increases the risk of infection. You may chew nicotine gum up to 2 hours before surgery. A nicotine patch is okay.  ? Note: Some surgeries require you to completely quit smoking and nicotine. Check with your surgeon.    Your care team will make every effort to keep you safe from infection. We will:  ? Clean our hands often with soap and water (or an alcohol-based hand rub).  ? Clean the skin at your surgery site with a special soap that kills germs. We'll also remove hair from the site as needed.  ? Wear special hair covers, masks, gowns and gloves during surgery.  ? Give antibiotic medicine, if prescribed. Not all surgeries need antibiotics.  What to bring on the day of surgery    Photo ID and insurance card    Copy of your health care directive, if you have one    Glasses and hearing aides (bring cases)  ? You can't wear contacts during surgery    Inhaler and eye drops, if you use them (tell us about these when you arrive)    CPAP machine or breathing device, if you use them    A few personal items, if spending the night    If you have . . .  ? A pacemaker or ICD (cardiac defibrillator): Bring the ID card.  ? An implanted stimulator: Bring the remote control.  ? A legal guardian: Bring a copy of the certified (court-stamped) guardianship papers.  Please remove any jewelry, including body piercings. Leave jewelry and other valuables at  "home.  If you're going home the day of surgery  Important: If you don't follow the rules below, we must cancel your surgery.     Arrange for someone to drive you home after surgery. You may not drive, take a taxi or take public transportation by yourself (unless you'll have local anesthesia only).    Arrange for a responsible adult to stay with you overnight. If you don't, we may keep you in the hospital overnight, and you may need to pay the costs yourself.  Questions?   If you have any questions for your care team, list them here: _________________________________________________________________________________________________________________________________________________________________________________________________________________________________________________________________________________________________________________________  For informational purposes only. Not to replace the advice of your health care provider. Copyright   4172-8558 [employer]. All rights reserved. Clinically reviewed by Asiya Hayden MD. [employer] 446155 - REV 07/19.    Preparing for Your Surgery  Getting started  A surgery nurse will call you to review your health history and instructions. They will give you an arrival time based on your scheduled surgery time.  Please be ready to share the following:    Your doctor's clinic name and phone number    Your medical, surgical and anesthesia history    A list of allergies and sensitivities    A list of medicines, including herbal treatments and over-the-counter drugs    Whether the patient has a legal guardian (ask how to send us the papers in advance)  If your child is having surgery, please ask for a copy of Preparing for Your Child's Surgery.    Preparing for surgery    Within 30 days of surgery: Have an exam at your family clinic (primary care clinic), or go to a pre-operative clinic. This exam is called a \"History and Physical,\" or H&P.    At your H&P exam, talk to " your care team about all medicines you take. If you need to stop any medicines before surgery, ask when to start taking them again.  ? We do this for your safety. Many medicines can make you bleed too much during surgery. Some change how well surgery (anesthesia) drugs work.    Call your insurance company to see what it will and won't pay for. Ask if they need to pre-approve the surgery. (If no insurance, call [phone].)    Call your surgeon's clinic if there's any change in your health. This includes signs of a cold or flu (sore throat, runny nose, cough, rash, fever). It also includes a scrape or scratch near the surgery site.    If you have questions on the day of surgery, call your surgery center.  Eating and drinking guidelines  For your safety: Unless your surgeon tells you otherwise, follow the guidelines below.    Eat and drink as usual until 8 hours before surgery. After that, no food or milk.    Drink clear liquids until 2 hours before surgery. These are liquids you can see through, like water, Gatorade and Propel Water. You may also have black coffee and tea (no cream or milk).    Nothing by mouth within 2 hours of surgery. This includes gum, candy and breath mints.    Stop alcohol the midnight before surgery.    If your family clinic tells you to take medicine on the morning of surgery, it's okay to take it with a sip of water.  Preventing infection    Shower or bathe the night before and morning of your surgery. Follow the instructions your clinic gave you. (If no instructions, use regular soap.)    Don't shave or clip hair near your surgery site. This can lead to skin infection.    Don't smoke the morning of surgery. Smoking increases the risk of infection. You may chew nicotine gum up to 2 hours before surgery. A nicotine patch is okay.  ? Note: Some surgeries require you to completely quit smoking and nicotine. Check with your surgeon.    Your care team will make every effort to keep you safe  from infection. We will:  ? Clean our hands often with soap and water (or an alcohol-based hand rub).  ? Clean the skin at your surgery site with a special soap that kills germs. We'll also remove hair from the site as needed.  ? Wear special hair covers, masks, gowns and gloves during surgery.  ? Give antibiotic medicine, if prescribed. Not all surgeries need antibiotics.  What to bring on the day of surgery    Photo ID and insurance card    Copy of your health care directive, if you have one    Glasses and hearing aides (bring cases)  ? You can't wear contacts during surgery    Inhaler and eye drops, if you use them (tell us about these when you arrive)    CPAP machine or breathing device, if you use them    A few personal items, if spending the night    If you have . . .  ? A pacemaker or ICD (cardiac defibrillator): Bring the ID card.  ? An implanted stimulator: Bring the remote control.  ? A legal guardian: Bring a copy of the certified (court-stamped) guardianship papers.  Please remove any jewelry, including body piercings. Leave jewelry and other valuables at home.  If you're going home the day of surgery  Important: If you don't follow the rules below, we must cancel your surgery.     Arrange for someone to drive you home after surgery. You may not drive, take a taxi or take public transportation by yourself (unless you'll have local anesthesia only).    Arrange for a responsible adult to stay with you overnight. If you don't, we may keep you in the hospital overnight, and you may need to pay the costs yourself.  Questions?   If you have any questions for your care team, list them here: _________________________________________________________________________________________________________________________________________________________________________________________________________________________________________________________________________________________________________________________  For  "informational purposes only. Not to replace the advice of your health care provider. Copyright   3250-3993 [facility]. All rights reserved. Clinically reviewed by Asiya Hayden MD. "[employer] [id number] - REV 07/19.    Before Your Procedure or Hospital Admission  Testing for COVID-19 (Coronavirus)  Thank you for choosing [facility] for your health care needs. The COVID-19 pandemic is a very challenging time for everyone.   Our goal is to keep you and our team here at [facility] safe and healthy. We've taken many steps to make this happen. For example:    We test and screen our staff, care teams and patients for COVID-19.    Everyone at [facility] must wear a mask and stay 6 feet apart.    We are limiting hospital and clinic visitors.  Before you come in  All patients must get tested for COVID-19. Your test needs to happen 3 to 4 days before you check in to the hospital or surgery site.   A clinic scheduler will call about a week in advance to set up a testing time at one of our labs. We'll take a swab of your nose or throat.  Note: If you go to a clinic or pharmacy like [facility] or [facility] for your test, make sure you get a test deep inside the nose. This is called a   naso-pharyngeal (NP) RT-PCR lab test. Don't get a \"rapid\" test or a saliva (spit) test. See \"Questions and Answers\" on the next page.  After the test, please stay at home and stay out of contact with other people. It will be harder for you to recover if you get COVID-19 before your treatment.  Please follow all current safety guidelines, including:    Limit trips outside your home.    Limit the number of people you see.    Always wear a mask outside your home.    Use social distancing. Stay 6 feet away from others whenever you can.    Wash your hands often.  If your test shows you have COVID-19  If your test is positive, we'll let you know. A positive test means that you have the virus.   We'll probably have to postpone your " admission, surgery or procedure. Your doctor will discuss this with you. After that, we'll let you know what to do and when you can re-schedule.   We may need to cancel your treatment on short notice for other reasons, too.  If your test shows you DON'T have COVID-19  Even if your test is negative, you can still get COVID-19. It's rare but, sometimes, the test result is wrong. You could also catch the virus after taking the test.   There's a very small chance that you could catch COVID-19 in the hospital or surgery center. [facility] has taken many steps to prevent this from happening.   Day of your surgery or procedure    Please come wearing a face covering that covers both your nose and mouth.    When you arrive, we'll ask you some questions to find out if you have any signs or symptoms of COVID-19.    Ask your care team if you can have visitors. All visitors must wear face coverings and will be screened for signs of COVID-19.  ? Even if no visitors are allowed, you can still have with you:    Your legal guardian or legal decision maker    A parent and one other visitor, if you are younger than 18 years old    A partner and a , if you are in labor  ? We might need to teach you about taking care of yourself after surgery. If so, a visitor can come into the hospital to learn about it, too.  ? The rules for visitors change often, depending on how much the virus is spreading. To learn more, see Visiting a Loved One in the Hospital during the COVID-19 Outbreak.  Please call your care team, hospital or surgery center if you have any questions. We thank you for your understanding and for choosing [facility] for your care.   Questions and Answers  Does it matter where I get tested for COVID-19?  Yes. We urge you to get tested at one of our [facility] COVID-19 testing sites. We process these tests in our lab and can get the results quickly. Your [facility] care team needs to get your  "results before you check in.  What should I do if I can't get tested at [facility]?  You can get tested somewhere else, but you'll need to take these extra steps:   1. Contact your family doctor or clinic to arrange your test.  2. Take the test within 4 days of your surgery or procedure. We can't accept tests older than 4 days.  3. Make sure you're getting a test deep inside the nose (a naso-pharyngeal, or NP, RT-PCR lab test). Many pharmacies use \"rapid\" tests or saliva (spit) tests. We don't accept those tests before surgery or procedures. For adults, tests deep inside the nose are the most accurate tests.  4. Make sure your doctor or clinic faxes your results to [facility] at [phone].  If we don't get your results in time, we may have to delay or cancel your treatment.  For informational purposes only. Not to replace the advice of your health care provider. Copyright   2020 [facility]. All rights reserved. Clinically reviewed by [facility] and the [facility] COVID-19 Clinical Team. [employer] [id number] - Rev 12/16/20.    How to Take Your Medication Before Surgery  - STOP taking all vitamins and herbal supplements 14 days before surgery.   Except your calcium and vitamin D     Don't take your sertraline the morning of surgery.     Thank you so much or choosing [facility]  for your Health Care. It was a pleasure seeing you at your visit today! Please contact us with any questions or concerns you may have.                   Giselle Mcdonnell MD                              To reach your [facility] care team after hours call:   [phone]    PLEASE NOTE OUR HOURS HAVE CHANGED secondary to COVID-19 coronavirus pandemic, as we are trying to minimize patient exposure to the virus,  which is now widespread in the [facility].  These hours may change with very little notice.  We apologize for any inconvenience.       Our " current clinic hours are:          Monday- Thursday   7:00am - 6:00pm  in person.      Friday  7:00am- 5:00pm                       Saturday and Sunday : Closed to in person and virtual visits        We have telephone and virtual visit times available between    7:00am - 6pm on Monday-Friday as well.                                                Phone:  [phone]      Our pharmacy hours:   Monday  9:00 am to 6:00 pm      Tuesday through Friday 9:00am to 5:00pm                        Saturday - 9:00 am to 12 noon       Sunday : Closed.              Phone:  [phone]    ###  Please note: at this time we are not accepting any walk-in visits. ###      There is also information available at our web site:  [email or web address]    If your provider ordered any lab tests and you do not receive the results within 10 business days, please call the clinic.    If you need a medication refill please contact your pharmacy.  Please allow 2 business days for your refill to be completed.    Our clinic offers telephone visits and e visits.  Please ask one of your team members to explain more.      Use [facility] (secure email communication and access to your chart) to send your primary care provider a message or make an appointment. Ask someone on your Team how to sign up for [facility].

## 2020-12-25 LAB
CREAT SERPL-MCNC: 0.67 MG/DL (ref 0.52–1.04)
GFR SERPL CREATININE-BSD FRML MDRD: >90 ML/MIN/{1.73_M2}

## 2020-12-26 DIAGNOSIS — Z11.59 ENCOUNTER FOR SCREENING FOR OTHER VIRAL DISEASES: ICD-10-CM

## 2020-12-26 PROCEDURE — U0003 INFECTIOUS AGENT DETECTION BY NUCLEIC ACID (DNA OR RNA); SEVERE ACUTE RESPIRATORY SYNDROME CORONAVIRUS 2 (SARS-COV-2) (CORONAVIRUS DISEASE [COVID-19]), AMPLIFIED PROBE TECHNIQUE, MAKING USE OF HIGH THROUGHPUT TECHNOLOGIES AS DESCRIBED BY CMS-2020-01-R: HCPCS | Performed by: SURGERY

## 2020-12-27 LAB
SARS-COV-2 RNA SPEC QL NAA+PROBE: NORMAL
SPECIMEN SOURCE: NORMAL

## 2020-12-28 LAB
LABORATORY COMMENT REPORT: NORMAL
SARS-COV-2 RNA SPEC QL NAA+PROBE: NEGATIVE
SPECIMEN SOURCE: NORMAL

## 2020-12-29 ENCOUNTER — TRANSFERRED RECORDS (OUTPATIENT)
Dept: HEALTH INFORMATION MANAGEMENT | Facility: CLINIC | Age: 50
End: 2020-12-29

## 2020-12-29 ENCOUNTER — ANESTHESIA (OUTPATIENT)
Dept: SURGERY | Facility: CLINIC | Age: 50
End: 2020-12-29
Payer: COMMERCIAL

## 2020-12-29 ENCOUNTER — ANESTHESIA EVENT (OUTPATIENT)
Dept: SURGERY | Facility: CLINIC | Age: 50
End: 2020-12-29
Payer: COMMERCIAL

## 2020-12-29 ENCOUNTER — HOSPITAL ENCOUNTER (OUTPATIENT)
Dept: MAMMOGRAPHY | Facility: CLINIC | Age: 50
End: 2020-12-29
Attending: SURGERY | Admitting: SURGERY
Payer: COMMERCIAL

## 2020-12-29 ENCOUNTER — HOSPITAL ENCOUNTER (OUTPATIENT)
Dept: ULTRASOUND IMAGING | Facility: CLINIC | Age: 50
End: 2020-12-29
Attending: SURGERY | Admitting: SURGERY
Payer: COMMERCIAL

## 2020-12-29 ENCOUNTER — APPOINTMENT (OUTPATIENT)
Dept: SURGERY | Facility: PHYSICIAN GROUP | Age: 50
End: 2020-12-29
Payer: COMMERCIAL

## 2020-12-29 ENCOUNTER — HOSPITAL ENCOUNTER (OUTPATIENT)
Dept: NUCLEAR MEDICINE | Facility: CLINIC | Age: 50
Setting detail: NUCLEAR MEDICINE
Discharge: HOME | End: 2020-12-29
Attending: SURGERY | Admitting: RADIOLOGY
Payer: COMMERCIAL

## 2020-12-29 ENCOUNTER — HOSPITAL ENCOUNTER (OUTPATIENT)
Facility: CLINIC | Age: 50
Discharge: HOME | End: 2020-12-29
Attending: SURGERY | Admitting: SURGERY
Payer: COMMERCIAL

## 2020-12-29 VITALS
OXYGEN SATURATION: 95 % | DIASTOLIC BLOOD PRESSURE: 65 MMHG | HEIGHT: 64 IN | WEIGHT: 113 LBS | SYSTOLIC BLOOD PRESSURE: 110 MMHG | TEMPERATURE: 98.4 F | BODY MASS INDEX: 19.29 KG/M2 | RESPIRATION RATE: 14 BRPM | HEART RATE: 75 BPM

## 2020-12-29 DIAGNOSIS — C50.911 MALIGNANT NEOPLASM OF RIGHT FEMALE BREAST, UNSPECIFIED ESTROGEN RECEPTOR STATUS, UNSPECIFIED SITE OF BREAST (H): ICD-10-CM

## 2020-12-29 DIAGNOSIS — C50.911 INVASIVE DUCTAL CARCINOMA OF BREAST, RIGHT (H): Primary | ICD-10-CM

## 2020-12-29 PROCEDURE — 250N000011 HC RX IP 250 OP 636: Performed by: ANESTHESIOLOGY

## 2020-12-29 PROCEDURE — 360N000021 HC SURGERY LEVEL 3 EA 15 ADDTL MIN: Performed by: SURGERY

## 2020-12-29 PROCEDURE — 88360 TUMOR IMMUNOHISTOCHEM/MANUAL: CPT | Mod: 26 | Performed by: PATHOLOGY

## 2020-12-29 PROCEDURE — 258N000003 HC RX IP 258 OP 636: Performed by: ANESTHESIOLOGY

## 2020-12-29 PROCEDURE — 250N000011 HC RX IP 250 OP 636: Performed by: SURGERY

## 2020-12-29 PROCEDURE — 250N000013 HC RX MED GY IP 250 OP 250 PS 637: Performed by: SURGERY

## 2020-12-29 PROCEDURE — 250N000011 HC RX IP 250 OP 636: Performed by: NURSE ANESTHETIST, CERTIFIED REGISTERED

## 2020-12-29 PROCEDURE — 999N001020 HC STATISTIC H-SEND OUTS PREP: Performed by: SURGERY

## 2020-12-29 PROCEDURE — 88331 PATH CONSLTJ SURG 1 BLK 1SPC: CPT | Mod: 26 | Performed by: PATHOLOGY

## 2020-12-29 PROCEDURE — 258N000003 HC RX IP 258 OP 636: Performed by: SURGERY

## 2020-12-29 PROCEDURE — 999N001019 HC STATISTIC H-FISH PROCESS B/S: Performed by: SURGERY

## 2020-12-29 PROCEDURE — 38792 RA TRACER ID OF SENTINL NODE: CPT

## 2020-12-29 PROCEDURE — 88377 M/PHMTRC ALYS ISHQUANT/SEMIQ: CPT | Mod: 26 | Performed by: MEDICAL GENETICS

## 2020-12-29 PROCEDURE — 88377 M/PHMTRC ALYS ISHQUANT/SEMIQ: CPT | Mod: TC | Performed by: PATHOLOGY

## 2020-12-29 PROCEDURE — 250N000009 HC RX 250: Performed by: NURSE ANESTHETIST, CERTIFIED REGISTERED

## 2020-12-29 PROCEDURE — 761N000002 HC RECOVERY PHASE 1 LEVEL 1 EA ADDTL HR: Performed by: SURGERY

## 2020-12-29 PROCEDURE — 761N000001 HC RECOVERY PHASE 1 LEVEL 1 FIRST HR: Performed by: SURGERY

## 2020-12-29 PROCEDURE — 999N000065 MA POST PROCEDURE RIGHT

## 2020-12-29 PROCEDURE — 88331 PATH CONSLTJ SURG 1 BLK 1SPC: CPT | Mod: TC | Performed by: SURGERY

## 2020-12-29 PROCEDURE — 360N000024 HC SURGERY LEVEL 3 W FLUORO 1ST 30 MIN: Performed by: SURGERY

## 2020-12-29 PROCEDURE — 88307 TISSUE EXAM BY PATHOLOGIST: CPT | Mod: TC | Performed by: SURGERY

## 2020-12-29 PROCEDURE — 343N000001 HC RX 343: Performed by: SURGERY

## 2020-12-29 PROCEDURE — 88307 TISSUE EXAM BY PATHOLOGIST: CPT | Mod: 26 | Performed by: PATHOLOGY

## 2020-12-29 PROCEDURE — 999N000104 MA BREAST SPECIMEN RIGHT OR

## 2020-12-29 PROCEDURE — 88360 TUMOR IMMUNOHISTOCHEM/MANUAL: CPT | Mod: TC | Performed by: SURGERY

## 2020-12-29 PROCEDURE — 19285 PERQ DEV BREAST 1ST US IMAG: CPT | Mod: 59,RT

## 2020-12-29 PROCEDURE — 370N000001 HC ANESTHESIA TECHNICAL FEE, 1ST 30 MIN: Performed by: SURGERY

## 2020-12-29 PROCEDURE — 370N000002 HC ANESTHESIA TECHNICAL FEE, EACH ADDTL 15 MIN: Performed by: SURGERY

## 2020-12-29 PROCEDURE — 272N000001 HC OR GENERAL SUPPLY STERILE: Performed by: SURGERY

## 2020-12-29 PROCEDURE — 250N000009 HC RX 250: Performed by: RADIOLOGY

## 2020-12-29 PROCEDURE — 258N000003 HC RX IP 258 OP 636: Performed by: NURSE ANESTHETIST, CERTIFIED REGISTERED

## 2020-12-29 PROCEDURE — A9520 TC99 TILMANOCEPT DIAG 0.5MCI: HCPCS | Performed by: SURGERY

## 2020-12-29 PROCEDURE — 761N000007 HC RECOVERY PHASE 2 EACH 15 MINS: Performed by: SURGERY

## 2020-12-29 RX ORDER — LABETALOL 20 MG/4 ML (5 MG/ML) INTRAVENOUS SYRINGE
10
Status: DISCONTINUED | OUTPATIENT
Start: 2020-12-29 | End: 2020-12-29 | Stop reason: HOSPADM

## 2020-12-29 RX ORDER — PROPOFOL 10 MG/ML
INJECTION, EMULSION INTRAVENOUS PRN
Status: DISCONTINUED | OUTPATIENT
Start: 2020-12-29 | End: 2020-12-29

## 2020-12-29 RX ORDER — NALOXONE HYDROCHLORIDE 0.4 MG/ML
0.2 INJECTION, SOLUTION INTRAMUSCULAR; INTRAVENOUS; SUBCUTANEOUS
Status: DISCONTINUED | OUTPATIENT
Start: 2020-12-29 | End: 2020-12-29 | Stop reason: HOSPADM

## 2020-12-29 RX ORDER — CEFAZOLIN SODIUM 2 G/100ML
2 INJECTION, SOLUTION INTRAVENOUS
Status: COMPLETED | OUTPATIENT
Start: 2020-12-29 | End: 2020-12-29

## 2020-12-29 RX ORDER — PROPOFOL 10 MG/ML
INJECTION, EMULSION INTRAVENOUS CONTINUOUS PRN
Status: DISCONTINUED | OUTPATIENT
Start: 2020-12-29 | End: 2020-12-29

## 2020-12-29 RX ORDER — SODIUM CHLORIDE, SODIUM LACTATE, POTASSIUM CHLORIDE, CALCIUM CHLORIDE 600; 310; 30; 20 MG/100ML; MG/100ML; MG/100ML; MG/100ML
INJECTION, SOLUTION INTRAVENOUS CONTINUOUS
Status: DISCONTINUED | OUTPATIENT
Start: 2020-12-29 | End: 2020-12-29 | Stop reason: HOSPADM

## 2020-12-29 RX ORDER — LIDOCAINE 40 MG/G
CREAM TOPICAL
Status: DISCONTINUED | OUTPATIENT
Start: 2020-12-29 | End: 2020-12-29 | Stop reason: HOSPADM

## 2020-12-29 RX ORDER — ONDANSETRON 4 MG/1
4-8 TABLET, ORALLY DISINTEGRATING ORAL EVERY 8 HOURS PRN
Qty: 8 TABLET | Refills: 0 | Status: SHIPPED | OUTPATIENT
Start: 2020-12-29 | End: 2021-01-11

## 2020-12-29 RX ORDER — HYDROMORPHONE HYDROCHLORIDE 1 MG/ML
.3-.5 INJECTION, SOLUTION INTRAMUSCULAR; INTRAVENOUS; SUBCUTANEOUS EVERY 10 MIN PRN
Status: DISCONTINUED | OUTPATIENT
Start: 2020-12-29 | End: 2020-12-29 | Stop reason: HOSPADM

## 2020-12-29 RX ORDER — MEPERIDINE HYDROCHLORIDE 25 MG/ML
12.5 INJECTION INTRAMUSCULAR; INTRAVENOUS; SUBCUTANEOUS
Status: DISCONTINUED | OUTPATIENT
Start: 2020-12-29 | End: 2020-12-29 | Stop reason: HOSPADM

## 2020-12-29 RX ORDER — SODIUM CHLORIDE, SODIUM LACTATE, POTASSIUM CHLORIDE, CALCIUM CHLORIDE 600; 310; 30; 20 MG/100ML; MG/100ML; MG/100ML; MG/100ML
INJECTION, SOLUTION INTRAVENOUS CONTINUOUS PRN
Status: DISCONTINUED | OUTPATIENT
Start: 2020-12-29 | End: 2020-12-29

## 2020-12-29 RX ORDER — NALOXONE HYDROCHLORIDE 0.4 MG/ML
0.4 INJECTION, SOLUTION INTRAMUSCULAR; INTRAVENOUS; SUBCUTANEOUS
Status: DISCONTINUED | OUTPATIENT
Start: 2020-12-29 | End: 2020-12-29 | Stop reason: HOSPADM

## 2020-12-29 RX ORDER — FENTANYL CITRATE 50 UG/ML
25-50 INJECTION, SOLUTION INTRAMUSCULAR; INTRAVENOUS
Status: DISCONTINUED | OUTPATIENT
Start: 2020-12-29 | End: 2020-12-29 | Stop reason: HOSPADM

## 2020-12-29 RX ORDER — CEFAZOLIN SODIUM 1 G/3ML
1 INJECTION, POWDER, FOR SOLUTION INTRAMUSCULAR; INTRAVENOUS SEE ADMIN INSTRUCTIONS
Status: DISCONTINUED | OUTPATIENT
Start: 2020-12-29 | End: 2020-12-29 | Stop reason: HOSPADM

## 2020-12-29 RX ORDER — ONDANSETRON 4 MG/1
4 TABLET, ORALLY DISINTEGRATING ORAL EVERY 30 MIN PRN
Status: DISCONTINUED | OUTPATIENT
Start: 2020-12-29 | End: 2020-12-29 | Stop reason: HOSPADM

## 2020-12-29 RX ORDER — ONDANSETRON 2 MG/ML
INJECTION INTRAMUSCULAR; INTRAVENOUS PRN
Status: DISCONTINUED | OUTPATIENT
Start: 2020-12-29 | End: 2020-12-29

## 2020-12-29 RX ORDER — BUPIVACAINE HYDROCHLORIDE 2.5 MG/ML
INJECTION, SOLUTION EPIDURAL; INFILTRATION; INTRACAUDAL PRN
Status: DISCONTINUED | OUTPATIENT
Start: 2020-12-29 | End: 2020-12-29 | Stop reason: HOSPADM

## 2020-12-29 RX ORDER — OXYCODONE HYDROCHLORIDE 5 MG/1
5-10 TABLET ORAL EVERY 4 HOURS PRN
Qty: 20 TABLET | Refills: 0 | Status: SHIPPED | OUTPATIENT
Start: 2020-12-29 | End: 2021-01-11

## 2020-12-29 RX ORDER — ONDANSETRON 2 MG/ML
4 INJECTION INTRAMUSCULAR; INTRAVENOUS EVERY 30 MIN PRN
Status: DISCONTINUED | OUTPATIENT
Start: 2020-12-29 | End: 2020-12-29 | Stop reason: HOSPADM

## 2020-12-29 RX ORDER — GLYCOPYRROLATE 0.2 MG/ML
INJECTION, SOLUTION INTRAMUSCULAR; INTRAVENOUS PRN
Status: DISCONTINUED | OUTPATIENT
Start: 2020-12-29 | End: 2020-12-29

## 2020-12-29 RX ORDER — DEXAMETHASONE SODIUM PHOSPHATE 4 MG/ML
INJECTION, SOLUTION INTRA-ARTICULAR; INTRALESIONAL; INTRAMUSCULAR; INTRAVENOUS; SOFT TISSUE PRN
Status: DISCONTINUED | OUTPATIENT
Start: 2020-12-29 | End: 2020-12-29

## 2020-12-29 RX ORDER — LIDOCAINE HYDROCHLORIDE 10 MG/ML
INJECTION, SOLUTION INFILTRATION; PERINEURAL PRN
Status: DISCONTINUED | OUTPATIENT
Start: 2020-12-29 | End: 2020-12-29

## 2020-12-29 RX ORDER — FENTANYL CITRATE 50 UG/ML
INJECTION, SOLUTION INTRAMUSCULAR; INTRAVENOUS PRN
Status: DISCONTINUED | OUTPATIENT
Start: 2020-12-29 | End: 2020-12-29

## 2020-12-29 RX ORDER — OXYCODONE HYDROCHLORIDE 5 MG/1
5 TABLET ORAL
Status: COMPLETED | OUTPATIENT
Start: 2020-12-29 | End: 2020-12-29

## 2020-12-29 RX ADMIN — SODIUM CHLORIDE, POTASSIUM CHLORIDE, SODIUM LACTATE AND CALCIUM CHLORIDE: 600; 310; 30; 20 INJECTION, SOLUTION INTRAVENOUS at 12:43

## 2020-12-29 RX ADMIN — LIDOCAINE HYDROCHLORIDE 5 ML: 10 INJECTION, SOLUTION EPIDURAL; INFILTRATION; INTRACAUDAL; PERINEURAL at 09:18

## 2020-12-29 RX ADMIN — OXYCODONE HYDROCHLORIDE 5 MG: 5 TABLET ORAL at 13:40

## 2020-12-29 RX ADMIN — PROCHLORPERAZINE EDISYLATE 10 MG: 5 INJECTION INTRAMUSCULAR; INTRAVENOUS at 13:01

## 2020-12-29 RX ADMIN — PROPOFOL 40 MCG/KG/MIN: 10 INJECTION, EMULSION INTRAVENOUS at 11:25

## 2020-12-29 RX ADMIN — CEFAZOLIN SODIUM 2 G: 2 INJECTION, SOLUTION INTRAVENOUS at 11:15

## 2020-12-29 RX ADMIN — LIDOCAINE HYDROCHLORIDE 50 MG: 10 INJECTION, SOLUTION INFILTRATION; PERINEURAL at 11:11

## 2020-12-29 RX ADMIN — FENTANYL CITRATE 100 MCG: 50 INJECTION, SOLUTION INTRAMUSCULAR; INTRAVENOUS at 11:11

## 2020-12-29 RX ADMIN — PROPOFOL 200 MG: 10 INJECTION, EMULSION INTRAVENOUS at 11:11

## 2020-12-29 RX ADMIN — ONDANSETRON HYDROCHLORIDE 4 MG: 2 INJECTION, SOLUTION INTRAVENOUS at 11:11

## 2020-12-29 RX ADMIN — GLYCOPYRROLATE 0.2 MG: 0.2 INJECTION, SOLUTION INTRAMUSCULAR; INTRAVENOUS at 11:11

## 2020-12-29 RX ADMIN — FENTANYL CITRATE 50 MCG: 50 INJECTION, SOLUTION INTRAMUSCULAR; INTRAVENOUS at 11:57

## 2020-12-29 RX ADMIN — MIDAZOLAM 2 MG: 1 INJECTION INTRAMUSCULAR; INTRAVENOUS at 11:06

## 2020-12-29 RX ADMIN — FENTANYL CITRATE 50 MCG: 50 INJECTION, SOLUTION INTRAMUSCULAR; INTRAVENOUS at 12:40

## 2020-12-29 RX ADMIN — FENTANYL CITRATE 50 MCG: 50 INJECTION, SOLUTION INTRAMUSCULAR; INTRAVENOUS at 12:55

## 2020-12-29 RX ADMIN — SODIUM CHLORIDE, POTASSIUM CHLORIDE, SODIUM LACTATE AND CALCIUM CHLORIDE: 600; 310; 30; 20 INJECTION, SOLUTION INTRAVENOUS at 10:09

## 2020-12-29 RX ADMIN — TILMANOCEPT 0.52 MILLICURIE: KIT at 09:56

## 2020-12-29 RX ADMIN — DEXAMETHASONE SODIUM PHOSPHATE 4 MG: 4 INJECTION, SOLUTION INTRA-ARTICULAR; INTRALESIONAL; INTRAMUSCULAR; INTRAVENOUS; SOFT TISSUE at 11:11

## 2020-12-29 ASSESSMENT — ENCOUNTER SYMPTOMS
DYSRHYTHMIAS: 0
SEIZURES: 0
STRIDOR: 0

## 2020-12-29 ASSESSMENT — COPD QUESTIONNAIRES: COPD: 0

## 2020-12-29 ASSESSMENT — LIFESTYLE VARIABLES: TOBACCO_USE: 0

## 2020-12-29 ASSESSMENT — MIFFLIN-ST. JEOR: SCORE: 1117.56

## 2020-12-29 NOTE — DISCHARGE INSTRUCTIONS
GENERAL ANESTHESIA OR SEDATION ADULT DISCHARGE INSTRUCTIONS   SPECIAL PRECAUTIONS FOR 24 HOURS AFTER SURGERY    IT IS NOT UNUSUAL TO FEEL LIGHT-HEADED OR FAINT, UP TO 24 HOURS AFTER SURGERY OR WHILE TAKING PAIN MEDICATION.  IF YOU HAVE THESE SYMPTOMS; SIT FOR A FEW MINUTES BEFORE STANDING AND HAVE SOMEONE ASSIST YOU WHEN YOU GET UP TO WALK OR USE THE BATHROOM.    YOU SHOULD REST AND RELAX FOR THE NEXT 24 HOURS AND YOU MUST MAKE ARRANGEMENTS TO HAVE SOMEONE STAY WITH YOU FOR AT LEAST 24 HOURS AFTER YOUR DISCHARGE.  AVOID HAZARDOUS AND STRENUOUS ACTIVITIES.  DO NOT MAKE IMPORTANT DECISIONS FOR 24 HOURS.    DO NOT DRIVE ANY VEHICLE OR OPERATE MECHANICAL EQUIPMENT FOR 24 HOURS FOLLOWING THE END OF YOUR SURGERY.  EVEN THOUGH YOU MAY FEEL NORMAL, YOUR REACTIONS MAY BE AFFECTED BY THE MEDICATION YOU HAVE RECEIVED.    DO NOT DRINK ALCOHOLIC BEVERAGES FOR 24 HOURS FOLLOWING YOUR SURGERY.    DRINK CLEAR LIQUIDS (APPLE JUICE, GINGER ALE, 7-UP, BROTH, ETC.).  PROGRESS TO YOUR REGULAR DIET AS YOU FEEL ABLE.    YOU MAY HAVE A DRY MOUTH, A SORE THROAT, MUSCLES ACHES OR TROUBLE SLEEPING.  THESE SHOULD GO AWAY AFTER 24 HOURS.    CALL YOUR DOCTOR FOR ANY OF THE FOLLOWING:  SIGNS OF INFECTION (FEVER, GROWING TENDERNESS AT THE SURGERY SITE, A LARGE AMOUNT OF DRAINAGE OR BLEEDING, SEVERE PAIN, FOUL-SMELLING DRAINAGE, REDNESS OR SWELLING.    IT HAS BEEN OVER 8 TO 10 HOURS SINCE SURGERY AND YOU ARE STILL NOT ABLE TO URINATE (PASS WATER).     LUMPECTOMY DISCHARGE INSTRUCTIONS  CHAPO Pacheco, ARIEL Brown. OSIRIS Ramon,   & MAULIK Unger    APPOINTMENT WITH YOUR SURGEON  ALL PATIENTS ARE TO SCHEDULE A POST-OP APPOINTMENT WITH THEIR GENERAL SURGEON.  ONCE YOU ARE HOME, CALL THE OFFICE TO SCHEDULE THE APPOINTMENT FOR 2-3 WEEKS FROM THE DATE OF YOUR SURGERY.  YOU MAY NEED TO BE SEEN SOONER IF YOU HAVE A DRAIN IN PLACE.    APPOINTMENTS TO SEE YOUR GENERAL SURGEON AT ANY OF OUR LOCATIONS CAN BE MADE BY CALLING:   #[phone].    YOU WILL RECEIVE A PHONE CALL FROM YOUR SURGEON WITH THE RESULTS.  YOU WILL BE ABLE TO ASK QUESTIONS AND RECEIVE MORE IN-DEPTH EXPLANATION AT YOUR POST-OP APPOINTMENT.  THIS APPOINTMENT WILL ALSO BE WHEN YOU DISCUSS FURTHER EVALUATION, TREATMENT AND REFERRAL TO ONCOLOGY AND/OR RADIATION ONCOLOGY IF THIS IS NECESSARY.  OCCASIONALLY SPECIAL TESTING MUST BE DONE ON THE SURGICAL SPECIMEN WHICH MAY DELAY THE POSTING OF YOUR FINAL PATHOLOGY REPORT.  YOU MAY CALL FOR YOUR FINAL PATHOLOGY REPORT AFTER 1 P.M. THREE WORKING DAYS AFTER SURGERY TO CHECK ON ITS STATUS.    SUPPORT  WEAR A BRA FOR FOR SUPPORT AND COMFORT FOR 7 DAYS, DAY AND NIGHT.     DRAIN  IF YOU HAVE DRAIN IN PLACE, YOU WILL NEED A SEPARATE APPOINTMENT WITH A NURSE IN THE OFFICE TO HAVE THIS REMOVED.  YOU WILL HAVE A FORM TO KEEP TRACK OF THE OUTPUT OF YOUR DRAIN.  WHEN THE OUTPUT REACHES A POINT WHERE THE TOTAL FOR A 24 HOUR PERIOD IS LESS THAN 30cc'S, YOU ARE READY TO HAVE THE DRAIN REMOVED.  AT THAT POINT YOU CAN CALL THE OFFICE AND TALK TO THE NURSE TO ARRANGE COMING INTO THE OFFICE TO HAVE THIS DONE.  IF YOU HAVE MORE THAN ONE DRAIN IN PLACE, THEY MAY NOT ALL BE REMOVED AT THE SAME TIME, AS THE OUTPUTS CAN BE VERY DIFFERENT FROM EACH.  THE NURSE WILL HELP YOU TO MANAGE THIS AND HELP DECIDE WHEN THE REMAINING DRAINS WILL BE TAKEN OUT.    INCISION CARE  -IF YOU HAVE A DRESSING IN PLACE, KEEP CLEAN AND DRY FOR 48 HOURS; YOU MAY REPLACE THE GAUZE IF IT BECOMES SOILED.  -AFTER 48 HOURS YOU MAY REMOVE THE DRESSING AND SHOWER.  DO NOT SUBMERSE INCISION IN WATER FOR 1 WEEK.  -IF YOU HAVE A DERMABOND DRESSING (A TYPE OF SKIN GLUE), YOU MAY SHOWER IMMEDIATELY.  -SUTURES WILL ABSORB AND DO NOT NEED TO BE REMOVED.  -IF PRESENT, LEAVE THE STERI-STRIPS (WHITE PAPER TAPES) IN PLACE FOR 14 DAYS AFTER SURGERY-IF PRESENT, LEAVE DERMABOND GLUE IN PLACE UNTIL IT WEARS/FLAKES OFF.  -YOU MAY EXPECT A SMALL AMOUNT OF DRAINAGE FROM YOUR INCISION.  -A LUMP/RIDGE  "UNDER THE INCISION IS NORMAL AND WILL GRADUALLY RESOLVE.    BATHING  YOU ARE ALLOWED TO BATH (SHALLOW WATER IN A TUB ONLY) OR SHOWER 24-48 HOURS AFTER YOUR SURGERY.  MILD SOAP IS OK TO USE NEAR THESE SITES.  WHEN BATHING, DO NOT ALLOW THE INCISION OR DRAIN SITE TO BECOME SUBMERSED IN WATER BECAUSE THIS MAY INCREASE THE RISK OF INFECTION.    ACTIVITIES  CAUTIOUSLY RESUME EXERCISE AND STRENUOUS ACTIVITIES LIKE JOGGING, TENNIS, AEROBICS, ETC.  ALSO, BE CAREFUL OF STRETCHING ACTIVITIES WITH THE OPERATIVE SIDE FOR TWO WEEKS.    IF YOU HAD A \"SENTINEL NODE BIOPSY\" AT THE TIME OF YOUR SURGERY:  YOU HAVE NO RESTRICTIONS IN ADDITION TO THOSE NOTED ABOVE.    IF YOU HAD AN \"AXILLARY NODE DISSECTION\" AT THE TIME OF YOUR SURGERY:  YOU ARE RECOMMENDED TO RESTRICT THE ACTIVITY OF THE ARM ON THE SIDE THE DISSECTION WAS DONE ON.  THIS MEANS:  NO REACHING OVERHEAD UNTIL CLEARED TO DO SO BY YOUR SURGEON, NO CARRYING WEIGHT ON THAT SIDE GREATER THAN A COUPLE POUNDS, NO INJECTIONS/VACCINATIONS/BLOOD SAMPLES FROM THAT SIDE, AND NO BLOOD PRESSURE MEASUREMENTS ON THAT SIDE.  IT IS ALSO RECOMMENDED TO ELEVATE THE ARM ON PILLOWS SEVERAL TIMES A DAY TO DECREASE/MINIMIZE SWELLING AND AVOID SLEEPING ON THAT ARM.    DIET  NO RESTRICTIONS.  INCREASED FLUID INTAKE IS RECOMMENDED.  WHILE TAKING PAIN MEDICATIONS, INCREASE DIETARY FIBER OR ADD A FIBER SUPPLEMENTATION LIKE METAMUCIL OR CITRUCEL TO HELP PREVENT CONSTIPATION - A POSSIBLE SIDE EFFECT OF PAIN MEDICATIONS.    DISCOMFORT  LOCAL ANESTHETIC PLACED AT SURGERY SHOULD PROVIDE RELIEF FOR 4-8 HOURS.  BEGIN TAKING PAIN PILLS BEFORE DISCOMFORT IS SEVERE.  TAKE THE PAIN MEDICATION WITH SOME FOOD, WHEN POSSIBLE, TO MINIMIZE SIDE EFFECTS.  INTERMITTENT USE OF ICE PACKS MAY HELP DURING THE FIRST 48 HOURS.  EXPECT GRADUAL IMPROVEMENT.    RETURN APPOINTMENT  SCHEDULE A FOLLOW-UP VISIT 2-3 WEEKS POST-OP.  OFFICE PHONE:  [phone]    CONTACT US IF THE FOLLOWING DEVELOPS  1.  A FEVER THAT IS ABOVE 101 " "DEGREES F.  2.  IF THERE IS A LARGE AMOUNT OF DRAINAGE, BLEEDING OR SWELLING.  3.  SEVERE PAIN THAT IS NOT RELIEVED BY YOUR PRESCRIPTION.  4.  DRAINAGE THAT IS THICK, CLOUDY, YELLOW, GREEN OR WHITE.  5.  ANY OTHER QUESTIONS NOT ANSWERED BY \"FREQUENTLY ASKED QUESTIONS\" SHEET.      "

## 2020-12-29 NOTE — PROGRESS NOTES
SBAR Wire Localization     SITUATION:  Patient to breast imaging center for imaging guided wire localizations before breast lumpectomy or excision biopsy with sentinel node injection.    BACKGROUND:  Breast imaging cancer, breast abnormality  Ordered procedure completed: Yes  Special needs identified: No     ASSESSMENT:  SBAR report called to patient care unit because of unexpected event in radiology: No  Allergies and medication list reviewed prior to procedure. Yes  Skin cleansed with ChloraPrep One-Step.  Anesthesia: approximately 5ml of 1% Lidocaine injection subcutaneous before wire insertion administered by the radiologist.   Gauze dressing over insertion site(s).  Post procedure mammogram completed: Yes    Patient tolerance: Patient tolerated ultrasound guided right breast wire localization well.    RECOMMENDATIONS:  Patient transferred to [facility] in stable condition via wheelchair with Transport Services.    Please call [facility] [place] [phone] if there are any questions.

## 2020-12-29 NOTE — PROGRESS NOTES
Spoke with patient after wire localization procedure.  Patient voices feeling good, denies pain or feeling faint.  Education provided regarding home care after procedure.  Reviewed after ROM exercises teaching sheet.  Patient transported via Nursing support to pre op. Denies concerns or further questions.

## 2020-12-29 NOTE — ANESTHESIA CARE TRANSFER NOTE
Patient: Jono Temlpeton    Procedure(s):  RIGHT BREAST WIRE LOCALIZED LUMPECTOMY, RIGHT SENTINEL NODE BIOPSY    Diagnosis: Malignant neoplasm of right female breast, unspecified estrogen receptor status, unspecified site of breast (H) [C50.911]  Diagnosis Additional Information: No value filed.    Anesthesia Type:   General     Note:  Airway :Face Mask  Patient transferred to:PACU  Comments: To PACU, report to RN, oxygen per face mask.      Vitals: (Last set prior to Anesthesia Care Transfer)    CRNA VITALS  12/29/2020 1201 - 12/29/2020 1236      12/29/2020             EKG:  NSR                Electronically Signed By: ALICIA Lima CRNA  December 29, 2020  12:36 PM

## 2020-12-29 NOTE — ANESTHESIA PROCEDURE NOTES
Airway   Date/Time: 12/29/2020 11:13 AM   Patient location during procedure: OR    Staff -   CRNA: Brown Barone APRN CRNA  Performed By: CRNA    Consent for Airway   Urgency: elective    Indications and Patient Condition  Indications for airway management: kristal-procedural  Induction type:intravenousMask difficulty assessment: 1 - vent by mask    Final Airway Details  Final airway type: supraglottic airway    Endotracheal Airway Details   Secured with: plastic tape    Post intubation assessment   Placement verified by: capnometry, equal breath sounds and chest rise   Number of attempts at approach: 1  Number of other approaches attempted: 0  Secured with:plastic tape  Ease of procedure: easy  Dentition: Intact and Unchanged

## 2020-12-29 NOTE — PROGRESS NOTES
Injected 520 uCi (microcuries) 99mTc-LYMPHOSEEK in the RIGHT Breast at the 10 o'clock position above the areola INTRADERMALLY for [place] Node Biopsy. Injection Completed @ 10:02am. JOSE MARIA

## 2020-12-29 NOTE — ANESTHESIA POSTPROCEDURE EVALUATION
Patient: Jono Templeton    Procedure(s):  RIGHT BREAST WIRE LOCALIZED LUMPECTOMY, RIGHT SENTINEL NODE BIOPSY    Diagnosis:Malignant neoplasm of right female breast, unspecified estrogen receptor status, unspecified site of breast (H) [C50.911]  Diagnosis Additional Information: No value filed.    Anesthesia Type:  General    Note:  Anesthesia Post Evaluation    Patient location during evaluation: PACU  Patient participation: Able to fully participate in evaluation  Level of consciousness: sleepy but conscious  Pain management: adequate  Airway patency: patent  Cardiovascular status: acceptable  Respiratory status: acceptable  Hydration status: acceptable  PONV: controlled     Anesthetic complications: None          Last vitals:  Vitals:    12/29/20 1330 12/29/20 1345 12/29/20 1400   BP: 119/77 113/84 120/82   Pulse: 67 67 76   Resp: 10 10 12   Temp: 97.1  F (36.2  C)     SpO2: 100% 96%          Electronically Signed By: Lionel Ocampo MD  December 29, 2020  2:16 PM

## 2020-12-29 NOTE — OP NOTE
General Surgery Operative Note    Pre-operative diagnosis:  Malignant neoplasm of right female breast, unspecified estrogen receptor status, unspecified site of breast (H) [C50.911]   Post-operative diagnosis: same   Procedure: Right Breast Lumpectomy, 12 o'clock, Wire-localized; right sentinel node biopsy   Surgeon: Randolph Hart MD   Assistant(s): Gale Thomas PA-C  - the PA's assistance was medically necessary in providing adequate exposure in the operating field, maintaining hemostasis, cuttting suture, clamping and ligating blood vessels, and visualization of the anatomic structures throughout the surgical procedure.   Anesthesia: General    Estimated blood loss: 5 cc's   Drains placed: None   Complications:  None   Findings:   [place] node x1 - by frozen section for malignancy.  Specimen mammogram revealed the marking clip, the lesion and the wire within the specimen.  The lumpectomy specimen was taken all the way down to the chest wall for maximal deep margins.     Indications for operation: This is a 50-year-old woman who was recently diagnosed with a right breast cancer.  We discussed the surgical options and the patient elected for wire localized lumpectomy with sentinel node biopsy.  We discussed the procedure, along with its risks and complications, in detail.  The patient agreed to proceed.    Details of the operation: After informed consent, the patient underwent placement of the localizing wire in the radiology suite.  She was then brought to the preoperative holding area where she underwent injection of the radioactive tracing agent.  The patient was then brought to the operating room where she underwent satisfactory induction of general anesthesia.  2 and half cc of dilute methylene blue were injected in the subareolar area.  The patient was now sterilely prepped and draped and an incision was made over a right axillary hotspot.  Dissection was carried through the subcutaneous tissue using  electrocautery.  We entered the axillary space and a blue, radioactive lymph node was identified.  This was dissected free from surrounding tissue using the harmonic scalpel.  It was removed a sentinel lymph node #1 and yielded counts of 1640.  This was sent for frozen section.  No additional blue or radioactive lymph nodes were identified.  The remainder of the axilla was at background levels of radiation.    We now proceeded to perform the lumpectomy.  A circumareolar incision was made over the palpable mass.  Dissection was carried down all the way to the chest wall circumferentially around the area which was both palpable and marked by the wire.  Care was taken to remove the area posterior and medial to the lesion, as there had been a question of a satellite lesion on MRI.  There was very dense tissue in the region, but no obvious malignancy was seen at margins.  Specimen mammogram revealed that the marking clip, the wire and the lesion were present within the specimen.  Frozen section analysis came back showing no evidence of malignancy within the axillary node.  Both incisions were now irrigated out.  Marking clips were placed within the lumpectomy cavity.  Local anesthetic was injected.  Skin incisions were closed using 3-0 Vicryl subdermal sutures followed by skin adhesive.    The patient tolerated the procedure well and was transferred to the recovery room in satisfactory condition.  Sponge and needle counts were correct at the close of the case.    Specimens:   ID Type Source Tests Collected by Time Destination   A : Right Breast [place] Node #1 Tissue Breast, Right SURGICAL PATHOLOGY EXAM Randolph Hart MD 12/29/2020 11:44 AM    B : Right Breast Lumpectomy Tissue Breast, Right SURGICAL PATHOLOGY EXAM Randolph Hart MD 12/29/2020 11:58 AM            Randolph Hart MD
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
## 2021-01-05 ENCOUNTER — TRANSFERRED RECORDS (OUTPATIENT)
Dept: HEALTH INFORMATION MANAGEMENT | Facility: CLINIC | Age: 51
End: 2021-01-05

## 2021-01-06 ENCOUNTER — TRANSFERRED RECORDS (OUTPATIENT)
Dept: SURGERY | Facility: CLINIC | Age: 51
End: 2021-01-06

## 2021-01-07 LAB
COPATH REPORT: NORMAL
COPATH REPORT: NORMAL

## 2021-01-11 ENCOUNTER — OFFICE VISIT (OUTPATIENT)
Dept: SURGERY | Facility: CLINIC | Age: 51
End: 2021-01-11
Payer: COMMERCIAL

## 2021-01-11 VITALS
BODY MASS INDEX: 19.29 KG/M2 | DIASTOLIC BLOOD PRESSURE: 82 MMHG | HEART RATE: 83 BPM | SYSTOLIC BLOOD PRESSURE: 114 MMHG | RESPIRATION RATE: 16 BRPM | HEIGHT: 64 IN | WEIGHT: 113 LBS | OXYGEN SATURATION: 97 %

## 2021-01-11 DIAGNOSIS — C50.911 INVASIVE DUCTAL CARCINOMA OF BREAST, RIGHT (H): ICD-10-CM

## 2021-01-11 DIAGNOSIS — Z09 SURGICAL FOLLOWUP VISIT: Primary | ICD-10-CM

## 2021-01-11 PROCEDURE — 99024 POSTOP FOLLOW-UP VISIT: CPT | Performed by: SURGERY

## 2021-01-11 ASSESSMENT — MIFFLIN-ST. JEOR: SCORE: 1117.56

## 2021-01-11 NOTE — LETTER
2021    RE: Jono Templeton, : 1970      The patient returns today to follow-up regarding her recent right lumpectomy and sentinel node biopsy.  Pathology revealed 2 separate lesions, a 1.3 cm ductal carcinoma and a 5 mm invasive lobular carcinoma.  The posterior margin was very close, but was taken maximally at the time of lumpectomy.  The sentinel lymph node was negative for malignancy.  At this point, genetic testing and Oncotype DX are pending.  Patient has no complaints relating to her incisions are healing.     The patient's incisions are healing nicely.  She did indicate that she wondered whether there was something on the left lateral breast, but I do not feel any obvious masses in that location.     Pathology results were again reviewed with the patient.     The patient is doing well.  At this point, we are awaiting genetic testing on both the patient and the tumor.  From a surgical standpoint, she does not need any further surgery relating to her lumpectomy.  I think she will require radiation therapy in any case, even if she were to choose a mastectomy, due to the close posterior margin.     Randolph Hart MD  [facility]

## 2021-01-11 NOTE — PROGRESS NOTES
The patient returns today to follow-up regarding her recent right lumpectomy and sentinel node biopsy.  Pathology revealed 2 separate lesions, a 1.3 cm ductal carcinoma and a 5 mm invasive lobular carcinoma.  The posterior margin was very close, but was taken maximally at the time of lumpectomy.  The sentinel lymph node was negative for malignancy.  At this point, genetic testing and Oncotype DX are pending.  Patient has no complaints relating to her incisions are healing.    The patient's incisions are healing nicely.  She did indicate that she wondered whether there was something on the left lateral breast, but I do not feel any obvious masses in that location.    Pathology results were again reviewed with the patient.    The patient is doing well.  At this point, we are awaiting genetic testing on both the patient and the tumor.  From a surgical standpoint, she does not need any further surgery relating to her lumpectomy.  I think she will require radiation therapy in any case, even if she were to choose a mastectomy, due to the close posterior margin.    Randolph Hart MD  [facility]    Please route or send letter to:  Primary Care Provider (PCP) and Dr. Leoncio Boss

## 2021-01-20 ENCOUNTER — TRANSFERRED RECORDS (OUTPATIENT)
Dept: HEALTH INFORMATION MANAGEMENT | Facility: CLINIC | Age: 51
End: 2021-01-20

## 2021-01-20 ENCOUNTER — TRANSFERRED RECORDS (OUTPATIENT)
Dept: SURGERY | Facility: CLINIC | Age: 51
End: 2021-01-20

## 2021-01-25 ENCOUNTER — TELEPHONE (OUTPATIENT)
Dept: FAMILY MEDICINE | Facility: CLINIC | Age: 51
End: 2021-01-25

## 2021-01-25 NOTE — TELEPHONE ENCOUNTER
Completed forms faxed back to [facility] at [phone].   Originals sent to be scanned.       Valarie Bustillo

## 2021-01-25 NOTE — TELEPHONE ENCOUNTER
Reason for Call:  Form, our goal is to have forms completed with 72 hours, however, some forms may require a visit or additional information.    Type of letter, form or note:  medical    Who is the form from?: [facility] (if other please explain)    Where did the form come from: form was faxed in    What clinic location was the form placed at?: [place]    Where the form was placed: [facility] Box/Folder    What number is listed as a contact on the form?: Fax to [phone]       Call taken on 1/25/2021 at 6:56 AM by Ada Reese

## 2021-02-12 ENCOUNTER — TRANSFERRED RECORDS (OUTPATIENT)
Dept: HEALTH INFORMATION MANAGEMENT | Facility: CLINIC | Age: 51
End: 2021-02-12

## 2021-02-12 ENCOUNTER — TRANSFERRED RECORDS (OUTPATIENT)
Dept: SURGERY | Facility: CLINIC | Age: 51
End: 2021-02-12

## 2021-02-15 ENCOUNTER — TRANSFERRED RECORDS (OUTPATIENT)
Dept: SURGERY | Facility: CLINIC | Age: 51
End: 2021-02-15

## 2021-02-15 ENCOUNTER — HOSPITAL ENCOUNTER (OUTPATIENT)
Facility: CLINIC | Age: 51
End: 2021-02-15
Attending: SURGERY | Admitting: SURGERY
Payer: COMMERCIAL

## 2021-02-15 ENCOUNTER — TELEPHONE (OUTPATIENT)
Dept: SURGERY | Facility: CLINIC | Age: 51
End: 2021-02-15

## 2021-02-15 ENCOUNTER — PREP FOR PROCEDURE (OUTPATIENT)
Dept: SURGERY | Facility: CLINIC | Age: 51
End: 2021-02-15

## 2021-02-15 DIAGNOSIS — C50.919 BREAST CANCER (H): Primary | ICD-10-CM

## 2021-02-15 DIAGNOSIS — C50.919 BREAST CANCER (H): ICD-10-CM

## 2021-02-15 RX ORDER — CEFAZOLIN SODIUM 1 G/3ML
1 INJECTION, POWDER, FOR SOLUTION INTRAMUSCULAR; INTRAVENOUS SEE ADMIN INSTRUCTIONS
Status: CANCELLED | OUTPATIENT
Start: 2021-02-15

## 2021-02-15 RX ORDER — CEFAZOLIN SODIUM 2 G/100ML
2 INJECTION, SOLUTION INTRAVENOUS
Status: CANCELLED | OUTPATIENT
Start: 2021-02-15

## 2021-02-15 NOTE — TELEPHONE ENCOUNTER
Type of surgery: POWER PORT A CATHETER PLACMENT    Location of surgery: [facility]  Date and time of surgery: 3/9/2021 @ 10:00 AM   Surgeon: MIRIAM VERMA MD   Pre-Op Appt Date: PATIENT TO SCHEDULE    Post-Op Appt Date: PATIENT TO SCHEDULE     Packet sent out: Yes  Pre-cert/Authorization completed:  Not Applicable  Date: 2/15/2021      POWER PORT A CATHETER PLACMENT    MAC PT INST TO HAVE H&P WITH DR FUENTES 60 MIN REQ NON DFB NMS

## 2021-03-02 ENCOUNTER — TELEPHONE (OUTPATIENT)
Dept: SURGERY | Facility: CLINIC | Age: 51
End: 2021-03-02

## 2021-03-02 DIAGNOSIS — Z11.59 ENCOUNTER FOR SCREENING FOR OTHER VIRAL DISEASES: ICD-10-CM

## 2021-03-02 NOTE — TELEPHONE ENCOUNTER
Type of surgery: POWER PORT A CATHETER PLACMENT    Location of surgery: [facility]  Date and time of surgery: 3/9/2021 @ 1:30 AM   Surgeon: Colleen Reynoso MD   Pre-Op Appt Date: PATIENT TO SCHEDULE    Post-Op Appt Date: PATIENT TO SCHEDULE     Packet sent out: Yes 2/15/2021   Pre-cert/Authorization completed:  Not Applicable  Date: 3/2/2021      POWER PORT A CATHETER PLACMENT    [facility] PT INST TO HAVE H&P WITH DR FUENTES 60 MIN REQ NON DFB NMS

## 2021-03-04 ENCOUNTER — OFFICE VISIT (OUTPATIENT)
Dept: FAMILY MEDICINE | Facility: CLINIC | Age: 51
End: 2021-03-04
Payer: COMMERCIAL

## 2021-03-04 VITALS
OXYGEN SATURATION: 98 % | DIASTOLIC BLOOD PRESSURE: 62 MMHG | HEIGHT: 64 IN | TEMPERATURE: 98.1 F | BODY MASS INDEX: 19.46 KG/M2 | SYSTOLIC BLOOD PRESSURE: 106 MMHG | HEART RATE: 88 BPM | WEIGHT: 114 LBS

## 2021-03-04 DIAGNOSIS — C50.911 MALIGNANT NEOPLASM OF RIGHT FEMALE BREAST, UNSPECIFIED ESTROGEN RECEPTOR STATUS, UNSPECIFIED SITE OF BREAST (H): ICD-10-CM

## 2021-03-04 DIAGNOSIS — Z01.818 PRE-OP EXAM: Primary | ICD-10-CM

## 2021-03-04 DIAGNOSIS — C50.911 INVASIVE DUCTAL CARCINOMA OF BREAST, RIGHT (H): ICD-10-CM

## 2021-03-04 DIAGNOSIS — F41.1 GENERALIZED ANXIETY DISORDER: ICD-10-CM

## 2021-03-04 DIAGNOSIS — J45.20 MILD INTERMITTENT ASTHMA WITHOUT COMPLICATION: ICD-10-CM

## 2021-03-04 PROBLEM — D25.9 UTERINE LEIOMYOMA, UNSPECIFIED LOCATION: Status: RESOLVED | Noted: 2018-05-10 | Resolved: 2021-03-04

## 2021-03-04 PROBLEM — C50.919 BREAST CANCER (H): Status: RESOLVED | Noted: 2021-02-15 | Resolved: 2021-03-04

## 2021-03-04 PROBLEM — N81.2 FIRST DEGREE UTERINE PROLAPSE: Status: RESOLVED | Noted: 2018-02-02 | Resolved: 2021-03-04

## 2021-03-04 PROBLEM — N92.0 EXCESSIVE OR FREQUENT MENSTRUATION: Status: RESOLVED | Noted: 2018-05-10 | Resolved: 2021-03-04

## 2021-03-04 PROCEDURE — 99214 OFFICE O/P EST MOD 30 MIN: CPT | Performed by: PHYSICIAN ASSISTANT

## 2021-03-04 RX ORDER — LORAZEPAM 0.5 MG/1
0.5 TABLET ORAL
COMMUNITY
Start: 2021-02-18 | End: 2022-10-05

## 2021-03-04 RX ORDER — PROCHLORPERAZINE MALEATE 10 MG
10 TABLET ORAL
COMMUNITY
Start: 2021-02-18 | End: 2021-09-03

## 2021-03-04 RX ORDER — ONDANSETRON 4 MG/1
4 TABLET, ORALLY DISINTEGRATING ORAL
COMMUNITY
Start: 2020-12-29 | End: 2021-03-05

## 2021-03-04 RX ORDER — DEXAMETHASONE 4 MG/1
4 TABLET ORAL DAILY
COMMUNITY
Start: 2021-02-18 | End: 2021-09-03

## 2021-03-04 ASSESSMENT — MIFFLIN-ST. JEOR: SCORE: 1122.1
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
## 2021-03-05 DIAGNOSIS — Z11.59 ENCOUNTER FOR SCREENING FOR OTHER VIRAL DISEASES: ICD-10-CM

## 2021-03-05 LAB
LABORATORY COMMENT REPORT: NORMAL
SARS-COV-2 RNA RESP QL NAA+PROBE: NEGATIVE
SARS-COV-2 RNA RESP QL NAA+PROBE: NORMAL
SPECIMEN SOURCE: NORMAL
SPECIMEN SOURCE: NORMAL

## 2021-03-05 PROCEDURE — U0003 INFECTIOUS AGENT DETECTION BY NUCLEIC ACID (DNA OR RNA); SEVERE ACUTE RESPIRATORY SYNDROME CORONAVIRUS 2 (SARS-COV-2) (CORONAVIRUS DISEASE [COVID-19]), AMPLIFIED PROBE TECHNIQUE, MAKING USE OF HIGH THROUGHPUT TECHNOLOGIES AS DESCRIBED BY CMS-2020-01-R: HCPCS | Performed by: SURGERY

## 2021-03-05 PROCEDURE — U0005 INFEC AGEN DETEC AMPLI PROBE: HCPCS | Performed by: SURGERY

## 2021-03-05 ASSESSMENT — MIFFLIN-ST. JEOR: SCORE: 1117.56

## 2021-03-08 ENCOUNTER — MYC MEDICAL ADVICE (OUTPATIENT)
Dept: FAMILY MEDICINE | Facility: CLINIC | Age: 51
End: 2021-03-08

## 2021-03-08 DIAGNOSIS — J45.20 MILD INTERMITTENT ASTHMA IN ADULT WITHOUT COMPLICATION: ICD-10-CM

## 2021-03-08 DIAGNOSIS — J45.20 MILD INTERMITTENT ASTHMA WITHOUT COMPLICATION: Primary | ICD-10-CM

## 2021-03-08 NOTE — LETTER
My Asthma Action Plan    Name: Jono Templeton   YOB: 1970  Date: 3/10/2021   My doctor: Joselo Marino MD   My clinic: [facility]        My Rescue Medicine:   Albuterol inhaler (Proair/Ventolin/Proventil HFA)  2-4 puffs EVERY 4 HOURS as needed. Use a spacer if recommended by your provider.   My Asthma Severity:   Intermittent / Exercise Induced  Know your asthma triggers: smoke, upper respiratory infections, dust mites, pollens, animal dander, insects/rodents, mold, humidity, aspirin, strong odors and fumes, occupational exposure, exercise or sports, emotions, cold air and Gastric Reflux             GREEN ZONE   Good Control    I feel good    No cough or wheeze    Can work, sleep and play without asthma symptoms       Take your asthma control medicine every day.     1. If exercise triggers your asthma, take your rescue medication    15 minutes before exercise or sports, and    During exercise if you have asthma symptoms  2. Spacer to use with inhaler: If you have a spacer, make sure to use it with your inhaler             YELLOW ZONE Getting Worse  I have ANY of these:    I do not feel good    Cough or wheeze    Chest feels tight    Wake up at night   1. Keep taking your Green Zone medications  2. Start taking your rescue medicine:    every 20 minutes for up to 1 hour. Then every 4 hours for 24-48 hours.  3. If you stay in the Yellow Zone for more than 12-24 hours, contact your doctor.  4. If you do not return to the Green Zone in 12-24 hours or you get worse, start taking your oral steroid medicine if prescribed by your provider.           RED ZONE Medical Alert - Get Help  I have ANY of these:    I feel awful    Medicine is not helping    Breathing getting harder    Trouble walking or talking    Nose opens wide to breathe       1. Take your rescue medicine NOW  2. If your provider has prescribed an oral steroid medicine, start taking it NOW  3. Call your doctor NOW  4. If  you are still in the Red Zone after 20 minutes and you have not reached your doctor:    Take your rescue medicine again and    Call 911 or go to the emergency room right away    See your regular doctor within 2 weeks of an Emergency Room or Urgent Care visit for follow-up treatment.          Annual Reminders:  Meet with Asthma Educator,  Flu Shot in the Fall, consider Pneumonia Vaccination for patients with asthma (aged 19 and older).    Pharmacy:    [facility] - [place], [place] - [address] [facility] [id number]  [place], [place] - [address] [address]    Electronically signed by Joselo Marino MD   Date: 03/10/21                    Asthma Triggers  How To Control Things That Make Your Asthma Worse    Triggers are things that make your asthma worse.  Look at the list below to help you find your triggers and   what you can do about them. You can help prevent asthma flare-ups by staying away from your triggers.      Trigger                                                          What you can do   Cigarette Smoke  Tobacco smoke can make asthma worse. Do not allow smoking in your home, car or around you.  Be sure no one smokes at a child s day care or school.  If you smoke, ask your health care provider for ways to help you quit.  Ask family members to quit too.  Ask your health care provider for a referral to Quit Plan to help you quit smoking, or call 2-415-270-PLAN.     Colds, Flu, Bronchitis  These are common triggers of asthma. Wash your hands often.  Don t touch your eyes, nose or mouth.  Get a flu shot every year.     Dust Mites  These are tiny bugs that live in cloth or carpet. They are too small to see. Wash sheets and blankets in hot water every week.   Encase pillows and mattress in dust mite proof covers.  Avoid having carpet if you can. If you have carpet, vacuum weekly.   Use a dust mask and HEPA vacuum.   Pollen and Outdoor Mold  Some people are allergic to trees, grass, or  weed pollen, or molds. Try to keep your windows closed.  Limit time out doors when pollen count is high.   Ask you health care provider about taking medicine during allergy season.     Animal Dander  Some people are allergic to skin flakes, urine or saliva from pets with fur or feathers. Keep pets with fur or feathers out of your home.    If you can t keep the pet outdoors, then keep the pet out of your bedroom.  Keep the bedroom door closed.  Keep pets off cloth furniture and away from stuffed toys.     Mice, Rats, and Cockroaches  Some people are allergic to the waste from these pests.   Cover food and garbage.  Clean up spills and food crumbs.  Store grease in the refrigerator.   Keep food out of the bedroom.   Indoor Mold  This can be a trigger if your home has high moisture. Fix leaking faucets, pipes, or other sources of water.   Clean moldy surfaces.  Dehumidify basement if it is damp and smelly.   Smoke, Strong Odors, and Sprays  These can reduce air quality. Stay away from strong odors and sprays, such as perfume, powder, hair spray, paints, smoke incense, paint, cleaning products, candles and new carpet.   Exercise or Sports  Some people with asthma have this trigger. Be active!  Ask your doctor about taking medicine before sports or exercise to prevent symptoms.    Warm up for 5-10 minutes before and after sports or exercise.     Other Triggers of Asthma  Cold air:  Cover your nose and mouth with a scarf.  Sometimes laughing or crying can be a trigger.  Some medicines and food can trigger asthma.

## 2021-03-09 ENCOUNTER — APPOINTMENT (OUTPATIENT)
Dept: GENERAL RADIOLOGY | Facility: CLINIC | Age: 51
End: 2021-03-09
Attending: SURGERY
Payer: COMMERCIAL

## 2021-03-09 ENCOUNTER — ANESTHESIA EVENT (OUTPATIENT)
Dept: SURGERY | Facility: CLINIC | Age: 51
End: 2021-03-09
Payer: COMMERCIAL

## 2021-03-09 ENCOUNTER — HOSPITAL ENCOUNTER (OUTPATIENT)
Facility: CLINIC | Age: 51
Discharge: HOME | End: 2021-03-09
Attending: SURGERY | Admitting: SURGERY
Payer: COMMERCIAL

## 2021-03-09 ENCOUNTER — APPOINTMENT (OUTPATIENT)
Dept: SURGERY | Facility: PHYSICIAN GROUP | Age: 51
End: 2021-03-09
Payer: COMMERCIAL

## 2021-03-09 ENCOUNTER — ANESTHESIA (OUTPATIENT)
Dept: SURGERY | Facility: CLINIC | Age: 51
End: 2021-03-09
Payer: COMMERCIAL

## 2021-03-09 VITALS
TEMPERATURE: 99.1 F | HEART RATE: 87 BPM | SYSTOLIC BLOOD PRESSURE: 109 MMHG | DIASTOLIC BLOOD PRESSURE: 64 MMHG | HEIGHT: 64 IN | OXYGEN SATURATION: 98 % | BODY MASS INDEX: 19.29 KG/M2 | RESPIRATION RATE: 16 BRPM | WEIGHT: 113 LBS

## 2021-03-09 DIAGNOSIS — C50.911 INVASIVE DUCTAL CARCINOMA OF BREAST, RIGHT (H): Primary | ICD-10-CM

## 2021-03-09 PROCEDURE — 370N000017 HC ANESTHESIA TECHNICAL FEE, PER MIN: Performed by: SURGERY

## 2021-03-09 PROCEDURE — 250N000011 HC RX IP 250 OP 636: Performed by: SURGERY

## 2021-03-09 PROCEDURE — 250N000009 HC RX 250: Performed by: NURSE ANESTHETIST, CERTIFIED REGISTERED

## 2021-03-09 PROCEDURE — C1788 PORT, INDWELLING, IMP: HCPCS | Performed by: SURGERY

## 2021-03-09 PROCEDURE — 999N000141 HC STATISTIC PRE-PROCEDURE NURSING ASSESSMENT: Performed by: SURGERY

## 2021-03-09 PROCEDURE — 999N000065 XR CHEST PORT 1 VW

## 2021-03-09 PROCEDURE — 272N000001 HC OR GENERAL SUPPLY STERILE: Performed by: SURGERY

## 2021-03-09 PROCEDURE — 258N000003 HC RX IP 258 OP 636: Performed by: ANESTHESIOLOGY

## 2021-03-09 PROCEDURE — 250N000011 HC RX IP 250 OP 636: Performed by: PHYSICIAN ASSISTANT

## 2021-03-09 PROCEDURE — 250N000009 HC RX 250: Performed by: SURGERY

## 2021-03-09 PROCEDURE — 710N000012 HC RECOVERY PHASE 2, PER MINUTE: Performed by: SURGERY

## 2021-03-09 PROCEDURE — 360N000082 HC SURGERY LEVEL 2 W/ FLUORO, PER MIN: Performed by: SURGERY

## 2021-03-09 PROCEDURE — 999N000179 XR SURGERY CARM FLUORO LESS THAN 5 MIN W STILLS: Mod: TC

## 2021-03-09 PROCEDURE — 250N000011 HC RX IP 250 OP 636: Performed by: NURSE ANESTHETIST, CERTIFIED REGISTERED

## 2021-03-09 DEVICE — CATH PORT MRI POWERPORT 8FR SL 1808000
Type: IMPLANTABLE DEVICE | Site: CHEST  WALL | Status: NON-FUNCTIONAL
Removed: 2021-09-07

## 2021-03-09 RX ORDER — ONDANSETRON 2 MG/ML
4 INJECTION INTRAMUSCULAR; INTRAVENOUS EVERY 30 MIN PRN
Status: CANCELLED | OUTPATIENT
Start: 2021-03-09

## 2021-03-09 RX ORDER — PROPOFOL 10 MG/ML
INJECTION, EMULSION INTRAVENOUS PRN
Status: DISCONTINUED | OUTPATIENT
Start: 2021-03-09 | End: 2021-03-09

## 2021-03-09 RX ORDER — OXYCODONE HYDROCHLORIDE 5 MG/1
5 TABLET ORAL
Status: CANCELLED | OUTPATIENT
Start: 2021-03-09

## 2021-03-09 RX ORDER — FENTANYL CITRATE 50 UG/ML
INJECTION, SOLUTION INTRAMUSCULAR; INTRAVENOUS PRN
Status: DISCONTINUED | OUTPATIENT
Start: 2021-03-09 | End: 2021-03-09

## 2021-03-09 RX ORDER — CEFAZOLIN SODIUM 2 G/100ML
2 INJECTION, SOLUTION INTRAVENOUS
Status: DISCONTINUED | OUTPATIENT
Start: 2021-03-09 | End: 2021-03-09 | Stop reason: HOSPADM

## 2021-03-09 RX ORDER — HYDRALAZINE HYDROCHLORIDE 20 MG/ML
2.5-5 INJECTION INTRAMUSCULAR; INTRAVENOUS EVERY 10 MIN PRN
Status: CANCELLED | OUTPATIENT
Start: 2021-03-09

## 2021-03-09 RX ORDER — NALOXONE HYDROCHLORIDE 0.4 MG/ML
0.2 INJECTION, SOLUTION INTRAMUSCULAR; INTRAVENOUS; SUBCUTANEOUS
Status: CANCELLED | OUTPATIENT
Start: 2021-03-09 | End: 2021-03-10

## 2021-03-09 RX ORDER — MEPERIDINE HYDROCHLORIDE 25 MG/ML
12.5 INJECTION INTRAMUSCULAR; INTRAVENOUS; SUBCUTANEOUS EVERY 5 MIN PRN
Status: CANCELLED | OUTPATIENT
Start: 2021-03-09

## 2021-03-09 RX ORDER — ONDANSETRON 4 MG/1
4-8 TABLET, ORALLY DISINTEGRATING ORAL EVERY 8 HOURS PRN
Qty: 4 TABLET | Refills: 0 | Status: SHIPPED | OUTPATIENT
Start: 2021-03-09 | End: 2021-09-03

## 2021-03-09 RX ORDER — LIDOCAINE 40 MG/G
CREAM TOPICAL
Status: DISCONTINUED | OUTPATIENT
Start: 2021-03-09 | End: 2021-03-09 | Stop reason: HOSPADM

## 2021-03-09 RX ORDER — SODIUM CHLORIDE, SODIUM LACTATE, POTASSIUM CHLORIDE, CALCIUM CHLORIDE 600; 310; 30; 20 MG/100ML; MG/100ML; MG/100ML; MG/100ML
INJECTION, SOLUTION INTRAVENOUS CONTINUOUS
Status: DISCONTINUED | OUTPATIENT
Start: 2021-03-09 | End: 2021-03-09 | Stop reason: HOSPADM

## 2021-03-09 RX ORDER — ONDANSETRON 2 MG/ML
INJECTION INTRAMUSCULAR; INTRAVENOUS PRN
Status: DISCONTINUED | OUTPATIENT
Start: 2021-03-09 | End: 2021-03-09

## 2021-03-09 RX ORDER — ONDANSETRON 4 MG/1
4 TABLET, ORALLY DISINTEGRATING ORAL EVERY 30 MIN PRN
Status: CANCELLED | OUTPATIENT
Start: 2021-03-09

## 2021-03-09 RX ORDER — HEPARIN SODIUM (PORCINE) LOCK FLUSH IV SOLN 100 UNIT/ML 100 UNIT/ML
SOLUTION INTRAVENOUS PRN
Status: DISCONTINUED | OUTPATIENT
Start: 2021-03-09 | End: 2021-03-09 | Stop reason: HOSPADM

## 2021-03-09 RX ORDER — DIAZEPAM 10 MG/2ML
2.5 INJECTION, SOLUTION INTRAMUSCULAR; INTRAVENOUS
Status: CANCELLED | OUTPATIENT
Start: 2021-03-09

## 2021-03-09 RX ORDER — NALOXONE HYDROCHLORIDE 0.4 MG/ML
0.4 INJECTION, SOLUTION INTRAMUSCULAR; INTRAVENOUS; SUBCUTANEOUS
Status: CANCELLED | OUTPATIENT
Start: 2021-03-09 | End: 2021-03-10

## 2021-03-09 RX ORDER — SODIUM CHLORIDE, SODIUM LACTATE, POTASSIUM CHLORIDE, CALCIUM CHLORIDE 600; 310; 30; 20 MG/100ML; MG/100ML; MG/100ML; MG/100ML
INJECTION, SOLUTION INTRAVENOUS CONTINUOUS
Status: CANCELLED | OUTPATIENT
Start: 2021-03-09

## 2021-03-09 RX ORDER — LABETALOL HYDROCHLORIDE 5 MG/ML
10 INJECTION, SOLUTION INTRAVENOUS
Status: CANCELLED | OUTPATIENT
Start: 2021-03-09

## 2021-03-09 RX ORDER — ACETAMINOPHEN 325 MG/1
975 TABLET ORAL ONCE
Status: CANCELLED | OUTPATIENT
Start: 2021-03-09 | End: 2021-03-09

## 2021-03-09 RX ORDER — CEFAZOLIN SODIUM 2 G/100ML
2 INJECTION, SOLUTION INTRAVENOUS SEE ADMIN INSTRUCTIONS
Status: DISCONTINUED | OUTPATIENT
Start: 2021-03-09 | End: 2021-03-09 | Stop reason: HOSPADM

## 2021-03-09 RX ORDER — DIMENHYDRINATE 50 MG/ML
25 INJECTION, SOLUTION INTRAMUSCULAR; INTRAVENOUS
Status: CANCELLED | OUTPATIENT
Start: 2021-03-09

## 2021-03-09 RX ORDER — FENTANYL CITRATE 50 UG/ML
25-50 INJECTION, SOLUTION INTRAMUSCULAR; INTRAVENOUS
Status: CANCELLED | OUTPATIENT
Start: 2021-03-09

## 2021-03-09 RX ORDER — OXYCODONE HYDROCHLORIDE 5 MG/1
5-10 TABLET ORAL EVERY 4 HOURS PRN
Qty: 12 TABLET | Refills: 0 | Status: SHIPPED | OUTPATIENT
Start: 2021-03-09 | End: 2021-08-26

## 2021-03-09 RX ORDER — HYDROMORPHONE HYDROCHLORIDE 1 MG/ML
.3-.5 INJECTION, SOLUTION INTRAMUSCULAR; INTRAVENOUS; SUBCUTANEOUS EVERY 5 MIN PRN
Status: CANCELLED | OUTPATIENT
Start: 2021-03-09

## 2021-03-09 RX ORDER — HEPARIN SODIUM,PORCINE 10 UNIT/ML
5 VIAL (ML) INTRAVENOUS
Status: DISCONTINUED | OUTPATIENT
Start: 2021-03-09 | End: 2021-03-09 | Stop reason: HOSPADM

## 2021-03-09 RX ORDER — PROPOFOL 10 MG/ML
INJECTION, EMULSION INTRAVENOUS CONTINUOUS PRN
Status: DISCONTINUED | OUTPATIENT
Start: 2021-03-09 | End: 2021-03-09

## 2021-03-09 RX ORDER — HEPARIN SODIUM,PORCINE 10 UNIT/ML
VIAL (ML) INTRAVENOUS PRN
Status: DISCONTINUED | OUTPATIENT
Start: 2021-03-09 | End: 2021-03-09 | Stop reason: HOSPADM

## 2021-03-09 RX ADMIN — ONDANSETRON HYDROCHLORIDE 4 MG: 2 INJECTION, SOLUTION INTRAVENOUS at 13:22

## 2021-03-09 RX ADMIN — PROPOFOL 30 MG: 10 INJECTION, EMULSION INTRAVENOUS at 13:22

## 2021-03-09 RX ADMIN — CEFAZOLIN SODIUM 2 G: 2 INJECTION, SOLUTION INTRAVENOUS at 13:17

## 2021-03-09 RX ADMIN — SODIUM CHLORIDE, POTASSIUM CHLORIDE, SODIUM LACTATE AND CALCIUM CHLORIDE: 600; 310; 30; 20 INJECTION, SOLUTION INTRAVENOUS at 13:17

## 2021-03-09 RX ADMIN — MIDAZOLAM 2 MG: 1 INJECTION INTRAMUSCULAR; INTRAVENOUS at 13:22

## 2021-03-09 RX ADMIN — FENTANYL CITRATE 100 MCG: 50 INJECTION, SOLUTION INTRAMUSCULAR; INTRAVENOUS at 13:22

## 2021-03-09 RX ADMIN — PROPOFOL 75 MCG/KG/MIN: 10 INJECTION, EMULSION INTRAVENOUS at 13:22

## 2021-03-09 ASSESSMENT — MIFFLIN-ST. JEOR: SCORE: 1117.56

## 2021-03-09 NOTE — TELEPHONE ENCOUNTER
Question #1 = 5  Question #2 = 3  Question #3 = 2  Question #4 =4  Question #5 =4  Number of Emergency Room visits related to asthma in the last 12 months = 0  Number of hospitalizations related  to asthma in the last 12 months = 0    ACT updated, routing to PCP to review and advise of request for Asthma to be put on problems list.    Tonio AYALA RN   [facility]

## 2021-03-09 NOTE — ANESTHESIA CARE TRANSFER NOTE
Patient: Jono Templeton    Procedure(s):  LEFT SUBCLAVIAN POWER PORT A CATHETER PLACEMENT    Diagnosis: Breast cancer (H) [C50.919]  Diagnosis Additional Information: No value filed.    Anesthesia Type:   MAC     Note:    Oropharynx: spontaneously breathing  Level of Consciousness: awake  Oxygen Supplementation: room air    Independent Airway: airway patency satisfactory and stable  Dentition: dentition unchanged  Vital Signs Stable: post-procedure vital signs reviewed and stable  Report to RN Given: handoff report given  Patient transferred to: Phase II  Comments: To Phase 2, report to RN.        Vitals: (Last set prior to Anesthesia Care Transfer)  CRNA VITALS  3/9/2021 1338 - 3/9/2021 1413      3/9/2021             Pulse:  89    SpO2:  100 %        Electronically Signed By: ALCIIA Lima CRNA  March 9, 2021  2:13 PM

## 2021-03-09 NOTE — DISCHARGE INSTRUCTIONS
GENERAL ANESTHESIA OR SEDATION ADULT DISCHARGE INSTRUCTIONS   SPECIAL PRECAUTIONS FOR 24 HOURS AFTER SURGERY    IT IS NOT UNUSUAL TO FEEL LIGHT-HEADED OR FAINT, UP TO 24 HOURS AFTER SURGERY OR WHILE TAKING PAIN MEDICATION.  IF YOU HAVE THESE SYMPTOMS; SIT FOR A FEW MINUTES BEFORE STANDING AND HAVE SOMEONE ASSIST YOU WHEN YOU GET UP TO WALK OR USE THE BATHROOM.    YOU SHOULD REST AND RELAX FOR THE NEXT 24 HOURS AND YOU MUST MAKE ARRANGEMENTS TO HAVE SOMEONE STAY WITH YOU FOR AT LEAST 24 HOURS AFTER YOUR DISCHARGE.  AVOID HAZARDOUS AND STRENUOUS ACTIVITIES.  DO NOT MAKE IMPORTANT DECISIONS FOR 24 HOURS.    DO NOT DRIVE ANY VEHICLE OR OPERATE MECHANICAL EQUIPMENT FOR 24 HOURS FOLLOWING THE END OF YOUR SURGERY.  EVEN THOUGH YOU MAY FEEL NORMAL, YOUR REACTIONS MAY BE AFFECTED BY THE MEDICATION YOU HAVE RECEIVED.    DO NOT DRINK ALCOHOLIC BEVERAGES FOR 24 HOURS FOLLOWING YOUR SURGERY.    DRINK CLEAR LIQUIDS (APPLE JUICE, GINGER ALE, 7-UP, BROTH, ETC.).  PROGRESS TO YOUR REGULAR DIET AS YOU FEEL ABLE.    YOU MAY HAVE A DRY MOUTH, A SORE THROAT, MUSCLES ACHES OR TROUBLE SLEEPING.  THESE SHOULD GO AWAY AFTER 24 HOURS.    CALL YOUR DOCTOR FOR ANY OF THE FOLLOWING:  SIGNS OF INFECTION (FEVER, GROWING TENDERNESS AT THE SURGERY SITE, A LARGE AMOUNT OF DRAINAGE OR BLEEDING, SEVERE PAIN, FOUL-SMELLING DRAINAGE, REDNESS OR SWELLING.    IT HAS BEEN OVER 8 TO 10 HOURS SINCE SURGERY AND YOU ARE STILL NOT ABLE TO URINATE (PASS WATER).     HOME CARE FOLLOWING MINOR SURGERY  CHAPO Pacheco, ISI Kuhn R. O Donnell, MAULIK Unger    RESULTS:  If a biopsy of tissue was done, you may call for your final pathology report after 1p.m. two working days after surgery.  Otherwise, this will be reviewed with you at time of phone follow-up (described below).    INCISIONAL CARE:    If you have a dressing in place, keep clean and dry for 48 hours; you may replace the gauze if it becomes soiled.    After 48 hours you may remove  the dressing and shower.  Do not submerse incision in water for 1 week.    If you have a Dermabond dressing (a type of skin glue), you may shower immediately.    Sutures which are beneath the skin will absorb and do not need to be removed.    Sutures you can see should be removed at your surgeon's office near 2 weeks postop, unless otherwise instructed.    If present, leave the steri-strips (white paper tapes) in place for 14 days after surgery.    If present, leave Dermabond glue in place until it wears/flakes off.    You may expect a small amount of drainage from your incision.    A lump/ridge under the incision is normal and will gradually resolve.  If it becomes red or very uncomfortable, contact the nurse at your surgeon's office to discuss whether this needs to be evaluated.    ACTIVITY:  Cautiously resume exercise and strenuous activities such as jogging, tennis, aerobics, etc. Also, be careful of stretching activities which affect the area of surgery for two weeks.    DIET:  Start with liquids and gradually resume your regular diet as tolerated.  Increased fluid intake is recommended. While taking pain medications, consider use of a stool softener, increase your fiber in your diet, or add a fiber supplement (like Metamucil, Citrucel) to help prevent constipation - a possible side effect of pain medications.    DISCOMFORT:  Local anesthetic placed at surgery should provide relief for 4-8 hours.  Begin taking pain pills before discomfort is severe.  Take the pain medication with some food, when possible, to minimize side effects.  Intermittent use of ice packs may help during the first 1-3 weeks after surgery.  Expect gradual improvement.    Over-the-counter anti-inflammatory medications (i.e. Ibuprofen/Advil/Motrin or Naprosyn/Aleve) may be used per package instructions in addition to or while tapering off the narcotic pain medications to decrease swelling and sensitivity.  DO NOT TAKE these Anti-inflammatory  medications if your primary physician has advised against doing so, or if you have acid reflux, ulcer, or bleeding disorder, or take blood-thinner medications.  Call your primary physician or the surgery office if you have medication questions.      FOLLOW-UP AFTER SURGERY:  -Our office will contact you approximately 2-3 weeks after surgery to check on your progress and answer any questions you may have.  If you are doing well, you will not need to return for an office appointment.  If any concerns are identified over the phone, we will help you make an appointment to see a provider.    -If you have not received a phone call, have any questions or concerns, or would like to be seen, please call us at [phone].  We are located at: [address], [address]; [place] [address]    -CONTACT US IF THE FOLLOWING DEVELOPS:   1. A fever that is above 101     2. Increased redness, warmth, drainage, bleeding, or swelling.   3. Pain that is not relieved by rest/ice and your prescription.   4.  Increasing pain after 48 hours.   5. Drainage that is thick, cloudy, yellow, green or white.   6. Any other questions or concerns.      FREQUENTLY ASKED QUESTIONS:    Q:  How should my incision look?    A:  Normally your incision will appear slightly swollen with light redness directly along the incision itself as it heals.  It may feel like a bump or ridge as the healing/scarring happens, and over time (3-4 months) this bump or ridge feeling should slowly go away.  In general, clear or pink watery drainage can be normal at first as your incision heals, but should decrease over time.    Q:  How do I know if my incision is infected?  A:  Look at your incision for signs of infection, like redness around the incision spreading to surrounding skin, or drainage of cloudy or foul-smelling drainage.  If you feel warm, check your temperature to see if you are running a fever.    **If any of these things occur, please notify the nurse  at our office.  We may need you to come into the office for an incision check.      Q:  How do I take care of my incision?  A:  If you have a dressing in place - Starting the day after surgery, replace the dressing 1-2 times a day until there is no further drainage from the incision.  At that time, a dressing is no longer needed.  Try to minimize tape on the skin if irritation is occurring at the tape sites.  If you have significant irritation from tape on the skin, please call the office to discuss other method of dressing your incision.    Small pieces of tape called  steri-strips  may be present directly overlying your incision; these may be removed 10 days after surgery unless otherwise specified by your surgeon.  If these tapes start to loosen at the ends, you may trim them back until they fall off or are removed.    A:  If you had  Dermabond  tissue glue used as a dressing (this causes your incision to look shiny with a clear covering over it) - This type of dressing wears off with time and does not require more dressings over the top unless it is draining around the glue as it wears off.  Do not apply ointments or lotions over the incisions until the glue has completely worn off.    Q:  There is a piece of tape or a sticky  lead  still on my skin.  Can I remove this?  A:  Sometimes the sticky  leads  used for monitoring during surgery or for evaluation in the emergency department are not all removed while you are in the hospital.  These sometimes have a tab or metal dot on them.  You can easily remove these on your own, like taking off a band-aid.  If there is a gel substance under the  lead , simply wipe/clean it off with a washcloth or paper towel.      Q:  What can I do to minimize constipation (very hard stools, or lack of stools)?  A:  Stay well hydrated.  Increase your dietary fiber intake or take a fiber supplement -with plenty of water.  Walk around frequently.  You may consider an over-the-counter  stool-softener.  Your Pharmacist can assist you with choosing one that is stocked at your pharmacy.  Constipation is also one of the most common side effects of pain medication.  If you are using pain medication, be pro-active and try to PREVENT problems with constipation by taking the steps above BEFORE constipation becomes a problem.    Q:  What do I do if I need more pain medications?  A:  Call the office to receive refills.  Be aware that certain pain meds cannot be called into a pharmacy and actually require a paper prescription.  A change may be made in your pain med as you progress thru your recovery period or if you have side effects to certain meds.    --Pain meds are NOT refilled after 5pm on weekdays, and NOT AT ALL on the weekends, so please look ahead to prevent problems.      Q:  Why am I having a hard time sleeping now that I am at home?  A:  Many medications you receive while you are in the hospital can impact your sleep for a number of days after your surgery/hospitalization.  Decreased level of activity and naps during the day may also make sleeping at night difficult.  Try to minimize day-time naps, and get up frequently during the day to walk around your home during your recovery time.  Sleep aides may be of some help, but are not recommended for long-term use.      Q:  I am having some back discomfort.  What should I do?  A:  This may be related to certain positioning that was required for your surgery, extended periods of time in bed, or other changes in your overall activity level.  You may try ice, heat, acetaminophen, or ibuprofen to treat this temporarily.  Note that many pain medications have acetaminophen in them and would state this on the prescription bottle.  Be sure not to exceed the maximum of 4000mg per day of acetaminophen.     **If the pain you are having does not resolve, is severe, or is a flare of back pain you have had on other occasions prior to surgery, please contact your  primary physician for further recommendations or for an appointment to be examined at their office.    Q:  Why am I having headaches?  A:  Headaches can be caused by many things:  caffeine withdrawal, use of pain meds, dehydration, high blood pressure, lack of sleep, over-activity/exhaustion, flare-up of usual migraine headaches.  If you feel this is related to muscle tension (a band-like feeling around the head, or a pressure at the low-back of the head) you may try ice or heat to this area.  You may need to drink more fluids (try electrolyte drink like Gatorade), rest, or take your usual migraine medications.   **If your headaches do not resolve, worsen, are accompanied by other symptoms, or if your blood pressure is high, please call your primary physician for recommendation and/or examination.    Q:  I am unable to urinate.  What do I do?  A:  A small percentage of people can have difficulty urinating initially after surgery.  This includes being able to urinate only a very small amount at a time and feeling discomfort or pressure in the very low abdomen.  This is called  urinary retention , and is actually an urgent situation.  Proceed to your nearest Emergency department for evaluation (not an Urgent Care Center).  Sometimes the bladder does not work correctly after certain medications you receive during surgery, or related to certain procedures.  You may need to have a catheter placed until your bladder recovers.  When planning to go to an Emergency department, it may help to call the ER to let them know you are coming in for this problem after a surgery.  This may help you get in quicker to be evaluated.  **If you have symptoms of a urinary tract infection, please contact your primary physician for the proper evaluation and treatment.        If you have other questions, please call the office Monday thru Friday between 8am and 5pm to discuss with the nurse or physician assistant.  [phone]    There is  a surgeon ON CALL on weekday evenings and over the weekend in case of urgent need only, and may be contacted at the same number.    If you are having an emergency, call 911 or proceed to your nearest emergency department.
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
## 2021-03-09 NOTE — ANESTHESIA POSTPROCEDURE EVALUATION
Patient: Jono Templeton    Procedure(s):  LEFT SUBCLAVIAN POWER PORT A CATHETER PLACEMENT    Diagnosis:Breast cancer (H) [C50.919]  Diagnosis Additional Information: No value filed.    Anesthesia Type:  MAC    Note:  Disposition: Outpatient   Postop Pain Control: Uneventful            Sign Out: Well controlled pain   PONV: No   Neuro/Psych: Uneventful            Sign Out: Acceptable/Baseline neuro status   Airway/Respiratory: Uneventful            Sign Out: Acceptable/Baseline resp. status   CV/Hemodynamics: Uneventful            Sign Out: Acceptable CV status   Other NRE: NONE   DID A NON-ROUTINE EVENT OCCUR? No         Last vitals:  Vitals:    03/09/21 1500 03/09/21 1515 03/09/21 1557   BP: 102/65 110/67 102/66   Pulse: 70 68 79   Resp: 12 12 16   Temp:   99  F (37.2  C)   SpO2:   94%       Last vitals prior to Anesthesia Care Transfer:  CRNA VITALS  3/9/2021 1338 - 3/9/2021 1438      3/9/2021             Pulse:  89    SpO2:  100 %          Electronically Signed By: Joselo Weaver MD  March 9, 2021  3:58 PM

## 2021-03-09 NOTE — OP NOTE
General Surgery Operative Note    Pre-operative diagnosis:  Breast cancer (H) [C50.919]   Post-operative diagnosis: same   Procedure:  Left subclavian PowerPort placement   Surgeon: Randolph Hart MD   Assistant(s): NONE   Anesthesia: Local with MAC    Estimated blood loss: 5 cc's   Drains placed: None   Complications:  None   Findings:   Left subclavian PowerPort placed without obvious complication.  The catheter was found to flush and aspirate well.  Chest x-ray is pending at the time of this note.     Indications for operation: This is a 50-year-old woman recently diagnosed with right breast cancer.  I have been asked to place a PowerPort for chemotherapy.  We discussed the procedure, along with its risks and complications, in detail.  The patient has agreed to proceed.    Details of the operation: After informed consent, the patient was taken to the operating room where she was sterilely prepped and draped.  The left subclavian region was infiltrated with a one-to-one mixture of 1% lidocaine and 0.25% Marcaine.  I did inspect for an external jugular vein, but this seemed to be a very small caliber.  I therefore elected for a subclavian approach.  A skin incision was made in the left subclavian region.  A chest wall pocket was created using electrocautery and blunt dissection.  The subclavian vein was now accessed utilizing a large bore needle through this incision.  A wire was passed without difficulty and the position of the wire was confirmed with C arm.  The sheath dilator assembly was now passed over the wire and the catheter passed through the sheath.  The sheath was then peeled away.  The position of the catheter was adjusted under C arm guidance.  The catheter was cut to length and attached to the port.  The port was placed into the chest wall pocket and the course and positioning of the catheter confirmed with C arm.  The hub of the port was now sutured to the chest wall using a 3-0 Vicryl suture.   The subdermal tissues were approximated using interrupted 3-0 Vicryl sutures and skin was closed using skin adhesive.  The catheter was now flushed with 6 cc of 100 units/cc heparin solution.    The patient tolerated the procedure well and was transferred to the recovery room in satisfactory condition.  Sponge and needle counts were correct at the close of the case.  Chest x-ray is pending at the time of this note.    Specimens: * No specimens in log *        Randolph Hart MD

## 2021-03-10 PROBLEM — J45.20 MILD INTERMITTENT ASTHMA IN ADULT WITHOUT COMPLICATION: Status: ACTIVE | Noted: 2021-03-10

## 2021-03-10 NOTE — PATIENT INSTRUCTIONS
"  Patient Education   Living with Asthma  What is asthma?   Asthma is a disease that affects breathing. There is no cure for asthma, but you will learn to control it.   The exact cause of asthma is not known. It appears to be more common in people who have allergies. It seems to run in families.  If you have asthma, you will always have some swelling in the lining of your airways. The muscles around the airways can tighten, making the airways smaller. The airways also produce more mucus, which makes them more likely to clog.  Normal, open airway Restricted airway       Asthma may occur at any age. Most children with asthma have their first symptoms before age 5. About half appear to \"outgrow\" their asthma, having fewer symptoms in their teen years. For some of these children, their asthma will return later in life.  What are the symptoms?  Symptoms of asthma include:     Coughing, especially at night or for several weeks after a cold, flu or other infection    Wheezing    Breathing faster than normal when at rest    Tightness or pain in the chest    Trouble breathing    Often feeling tired or out of breath    Trouble sleeping  In young children, it can be hard to tell if symptoms are from asthma or from another illness that causes coughing or wheezing.  What are some things that might trigger an asthma attack?  Asthma triggers are the things that cause asthma symptoms or make them worse. Triggers are different for each person.  Put a check next to the triggers that make asthma worse for you or your child. Then, read about how to control these triggers.    Smoking: Avoid second-hand smoke, incense and smoke from a fireplace or campfire. Use a HEPA air purifier in your bedroom. If anyone in your home smokes, now is the time to quit. For help quitting call 5-416-020-PLAN or go to www.[employer]/164000.pdf.    Cold air: Cover the nose and mouth with a scarf.    Mold: Keep the humidity in your home between 30 and 50 " percent. (You may need to buy a dehumidifier.) Fix leaky faucets, pipes and other water problems. Clean moldy surfaces with a  that contains bleach.    Pet hair or dander: Keep furred and feathered pets out of your home if you can. Be sure to keep them away from sleeping areas. Remove carpets and cloth-covered furniture. Use a HEPA air purifier in the bedroom.    Colds and flu: Hand-washing is the best way to prevent colds and other illnesses. You might also use hand . Be sure to get a yearly flu shot as well.    Strong odors: Use only unscented products (shampoo, soap, lotion, toilet paper, , etc.). Avoid perfume, cologne, scented aftershave, hair spray and air fresheners.    Dust and dust mites: Every week, wash bedding, blankets and stuffed animals in hot water. Remove carpets from the bedroom. Vacuum carpet, rugs and hardwood once or twice a week. (Be sure your vacuum  has a HEPA filter. Ideally, those with asthma should stay away for a while after rooms have been vacuumed.) Put pillows and mattresses inside a dust-proof cover. Keep the humidity in your home between 30 and 50 percent. (You may need to buy a dehumidifier.)    Cockroaches: Always keep food and garbage in closed containers. Use poison baits, powders, gels or paste (for example, boric acid). You can also use traps. If you use gates spray, those with asthma should stay away until the odor is gone.    Certain medicines: Tell your doctor about all the medicines you take. Include aspirin, cold medicines, vitamins and eye drops.    Pollen: Keep windows closed when there's a lot of pollen in the air. An air conditioner will help, too. Try to stay indoors from late morning to afternoon, when pollen is at its worst. Ask your doctor if you need to take or increase medicine before allergy season starts.    Exhaust from cars and buses: Try to stay indoors when air quality is poor. (For current quality levels, go to  "www.airnow.gov.) If you live near a street, keep windows closed.  Adapted from \"Asthma Action Plan,\"   [facility] Publication No. .  What should I do during an asthma attack?  At the first sign of an attack, follow your asthma action plan. This is the plan you received from your care team. It will tell you exactly what to do. Your action plan may include:    Medicine to improve breathing.    Calling a doctor or 911.  Call 911 if:    Breathing is very hard or fast.    The person cannot talk or walk.    You see \"sucking in\" between the ribs (called retractions).    The lips are turning blue.  How can I prevent an asthma attack?  As you learn to control asthma, life starts to feel normal again. You or your child can go back to regular activities, including sports. You have more energy for work, play, school and family. There will be fewer missed days at work or school. And, everyone in your home will sleep better at night.  But safety is the most important reason to control asthma. When asthma is well controlled, there is a much lower risk of dying from an asthma attack.   To control asthma:    Take your medicine every day, even when you feel well. This helps keep the airways open. For more about medicines, see page 4.    Avoid or control the things that cause an asthma attack.    See the doctor regularly, not just after an attack.    Get to know the earliest signs of an asthma attack. If an attack occurs, follow your action plan right away.    If your child has asthma, share their action plan with all caregivers. (This includes babysitters,  providers, teachers and school nurses.) Everyone who cares for your child should know what to do during an asthma attack. By a certain age, your child should learn what to do as well.  Asthma is under control when you:     Need an inhaler less than three times a week (except for exercise).    Wake up at night with symptoms fewer than twice a month.  To bring asthma under " control, you will need to work closely with your care team. It may take more than one try to find a plan that works for you.  Is it safe to exercise?  Yes--as long as asthma is well controlled.   People with asthma can play any sport and get any amount of exercise. This is true even if exercise has caused an asthma attack in the past. In fact, athletes with asthma regularly compete at the highest levels.   We all need exercise to stay healthy and strong.   If asthma is well controlled, you or your child can exercise safely. Follow these safety tips.    Ask your doctor if you need medicine before exercise. Take all medicines as directed.    Warm up before exercise. Try 5 to 10 minutes of walking or light stretching.    Don't exercise outdoors when asthma triggers are present (pollen, air pollution, cold air, etc.).    If your child as asthma, talk to your child's teachers or coaches. Be sure they know the signs of an asthma attack and how to treat it.  Is it safe to travel?   You can travel safely with asthma, but you need to plan ahead.    Carry extra medicine with you. Never keep your medicine in a checked bag.    Set aside a few days' worth of medicine in another place--in a handbag or with a travel partner--in case the items you carry are lost or stolen.    Tell the people you will travel with that you have asthma. Be sure they know the signs of an asthma attack and what to do if you need help.    Find out how and where to get medical care where you will be traveling.    Pack wisely. You will want to bring:  ? Asthma medicine (both relievers and controllers)  ? Written prescriptions, in case you lose your medicine  ? A spacer or chamber  ? A nebulizer or MDI  ? Copies of your asthma action plan  ? Your doctor's name, phone number and fax number  ? Your own address and phone number, as well as an emergency contact  ? Medical ID (bracelet, card or necklace) that states you have asthma  ? Your insurance card  Asthma  "in children and teens  As soon as they're old enough, children should learn to manage their asthma and make healthy choices. Ask the doctor to help your child set specific goals for self-care. Then, follow up on these at each doctor's visit. Your child's goals will change as your child grows.  Many children do a great job controlling their asthma. But when they enter their teen years, some children begin to have trouble. They don't like feeling \"different\" from their friends, so they may stop taking their medicines. They might worry about having an asthma attack in public. Some begin to smoke, or they spend time with friends who smoke.  When children and teens can't manage their care well, parents and other caregivers need to take charge. Check in each day to be sure your children are taking their medicines.  Asthma medicine  Remember, never leave asthma medicine within the reach of young children. A responsible adult should help your child take their medicine until the child is able to do it alone. After that, your child should carry an inhaler at all times. Do not leave the inhaler with the school nurse.  Children as young as three can learn to use an inhaler with a spacer. Those who have trouble may find a nebulizer helpful. (A nebulizer is a small machine that converts asthma medicine into a mist. The child breathes the mist in through a mask.)   Sometimes, an inhaler won't help during an asthma attack--your child may not be able to breathe in enough air to get the medicine into the lungs. If you think this may be happening, call 911 right away.  At school or   All of your child's teachers and coaches should know that your child has asthma.  At the start of each school year, try to meet with your child's teachers, coaches, principal and the school nurse. Be sure to give each person a copy of your child's asthma action plan. You should also:    Explain what do to if your child has an asthma " "attack.    Discuss your child's triggers and warning signs.    Show them how to use your child's medicines.    Leave important phone numbers (parents, doctor) in case your child has trouble breathing.  A note to teens  Asthma can be a special challenge in the teen years. Even if you've easily managed your asthma in the past, you may start to have more problems with symptoms. This could be due to hormonal changes, or it could result from changes in your lifestyle. Follow these tips:    Keep taking your medicines as directed. If you don't want to take controller medicine in front of your friends, take it in the morning or at night. You won't notice the effects of this medicine, but if you stop taking it, it could be harmful--even fatal.    Don't smoke, not even a little bit. Smoking and second-hand smoke can cause sudden, severe asthma attacks. If you smoke or spend time with smokers, it will be much harder to control your asthma. Smokers 14 to 19 years old can get help quitting from the American Lung Association's \"Not On Tobacco\" program. You can also get free phone counseling through the Lung Helpline ([facility]).    Stay active. You don't need to give up sports and other activities. Just be sure that your coaches each have a copy of your asthma action plan.    If you have concerns, talk to your family or doctor. Maybe you're nervous about having an asthma attack in public. Or maybe there's an asthma trigger at a friend's house and you're not sure what do to. Remember, you're not in this alone. Your support network is ready to help brainstorm solutions, so keep the lines of communication open.  For patients  To help control my asthma:    I will work with my care team.    I will follow my asthma action plan.    I will take all of my medicines.    I will know about my asthma triggers and how to avoid them.    I will know the early signs of an asthma attack.    I will know how to tell when my asthma is getting " "worse.    I will know when to call my doctor or 911.    If I am a smoker, I will quit smoking.  When my asthma is under control, I:    Need an inhaler less than three times a week (except for exercise)    Sleep through the night better    Can run and exercise    Miss fewer days of work    Other: __________________________  For parents  To help control my child's asthma:    I will work with my child's care team.    I will help my child follow their asthma action plan.    I will share the action plan with anyone who cares for my child.    I will make sure my child takes all of their medicines.    I will help my child avoid their asthma triggers.    I will know the early signs of an asthma attack.    I will know how to tell when my child's asthma is getting worse.    I will know when to call the doctor or 911.    If I am a smoker, I will quit smoking.  When my child's asthma is under control, they:    Need an inhaler less than three times a week (except for exercise)    Sleep through the night better    Can run and play    Miss fewer days at school    Other: __________________________  Resources  Air Now (for current information about air quality)  www.airnow.gov  American Lung Association in [place]  168.115.3163  www.lung.org/about-us/local-associations/[place].html  Centers for Disease Control and Prevention  413.260.4786  www.cdc.gov/asthma  [facility] -- Asthma  www.pbskids.org/jian/health/asthma&#047;  [facility]  kidshealth.org/parent/centers/asthma_center.html  [facility]  489.898.1680  www.health.[facility].mn.us  (search \"asthma\")  Preventing Asthma Attacks in Children  www.noattacks.org  [place] Environmental Protection Agency  www.epa.gov/asthma  For informational purposes only. Not to replace the advice of your health care provider.   Copyright   2014 [employer] [facility]. All rights reserved. 951057 - REV 02/20.       "

## 2021-03-11 ASSESSMENT — ASTHMA QUESTIONNAIRES: ACT_TOTALSCORE: 21

## 2021-03-24 ENCOUNTER — DOCUMENTATION ONLY (OUTPATIENT)
Dept: OTHER | Facility: CLINIC | Age: 51
End: 2021-03-24

## 2021-03-30 ENCOUNTER — TRANSFERRED RECORDS (OUTPATIENT)
Dept: HEALTH INFORMATION MANAGEMENT | Facility: CLINIC | Age: 51
End: 2021-03-30

## 2021-03-30 ENCOUNTER — TRANSFERRED RECORDS (OUTPATIENT)
Dept: SURGERY | Facility: CLINIC | Age: 51
End: 2021-03-30

## 2021-05-12 ENCOUNTER — TRANSFERRED RECORDS (OUTPATIENT)
Dept: HEALTH INFORMATION MANAGEMENT | Facility: CLINIC | Age: 51
End: 2021-05-12

## 2021-05-12 ENCOUNTER — TRANSFERRED RECORDS (OUTPATIENT)
Dept: SURGERY | Facility: CLINIC | Age: 51
End: 2021-05-12

## 2021-05-12 DIAGNOSIS — F41.1 ANXIETY STATE: ICD-10-CM

## 2021-05-12 RX ORDER — SERTRALINE HYDROCHLORIDE 100 MG/1
100 TABLET, FILM COATED ORAL DAILY
Qty: 90 TABLET | Refills: 1 | Status: SHIPPED | OUTPATIENT
Start: 2021-05-12 | End: 2021-08-26

## 2021-05-15 DIAGNOSIS — F41.1 ANXIETY STATE: ICD-10-CM

## 2021-05-18 RX ORDER — SERTRALINE HYDROCHLORIDE 100 MG/1
100 TABLET, FILM COATED ORAL DAILY
Qty: 90 TABLET | Refills: 1 | OUTPATIENT
Start: 2021-05-18

## 2021-05-18 NOTE — TELEPHONE ENCOUNTER
#90 x 1 refill sent 5/12/21  No pharmacy change  Rx denied. Pharmacy notified.       Samira Ram RN  [facility]

## 2021-05-20 ENCOUNTER — HOSPITAL ENCOUNTER (OUTPATIENT)
Dept: ULTRASOUND IMAGING | Facility: CLINIC | Age: 51
End: 2021-05-20
Attending: INTERNAL MEDICINE
Payer: COMMERCIAL

## 2021-05-20 ENCOUNTER — HOSPITAL ENCOUNTER (OUTPATIENT)
Dept: MAMMOGRAPHY | Facility: CLINIC | Age: 51
End: 2021-05-20
Attending: INTERNAL MEDICINE
Payer: COMMERCIAL

## 2021-05-20 DIAGNOSIS — N64.4 MASTODYNIA OF RIGHT BREAST: ICD-10-CM

## 2021-05-20 DIAGNOSIS — N63.0 BREAST LUMP: ICD-10-CM

## 2021-05-20 DIAGNOSIS — C50.811 MALIGNANT NEOPLASM OF OVERLAPPING SITES OF RIGHT FEMALE BREAST, UNSPECIFIED ESTROGEN RECEPTOR STATUS (H): ICD-10-CM

## 2021-05-20 PROCEDURE — G0279 TOMOSYNTHESIS, MAMMO: HCPCS

## 2021-05-20 PROCEDURE — 76642 ULTRASOUND BREAST LIMITED: CPT | Mod: RT

## 2021-05-27 ENCOUNTER — NURSE TRIAGE (OUTPATIENT)
Dept: FAMILY MEDICINE | Facility: CLINIC | Age: 51
End: 2021-05-27

## 2021-05-27 ENCOUNTER — MYC MEDICAL ADVICE (OUTPATIENT)
Dept: FAMILY MEDICINE | Facility: CLINIC | Age: 51
End: 2021-05-27

## 2021-05-27 NOTE — PROGRESS NOTES
Assessment & Plan     Shortness of breath  Invasive ductal carcinoma of breast, right (H)-invasive ductal carcinoma, grade 2, ER +, [place] +, [id number] negative     Nonspecific abnormal electrocardiogram (ECG) (EKG)  Reassuring vital signs and examination in clinic today however she is at high risk for blood clots given chemotherapy.  Did discuss with her I think this is important that we rule this out.  Other etiologies do also exist.    Low suspicion of Covid she is fully vaccinated we will still check Covid today.  Discussed case with [facility] and they are willing to accept her today-she will go there from here.  Would like troponin added as she has a mild change in EKG with negative T waves in both V1 V2.  This is new from her December EKG.  She denies any chest pain.  Written education provided.  Red flag symptoms discussed and if these occur present to the emergency room or call 911.  Jono verbalizes understanding of plan of care and is in agreement.   - EKG 12-lead complete w/read - Clinics  - Symptomatic COVID-19 Virus (Coronavirus) by PCR  - Referral to Acute and Diagnostic Services (Day of diagnostic / First order acute)    Return today (on 5/28/2021) for [facility] now.    Gayle Lee, [facility]   Viet is a 51 year old who presents for the following health issues    HPI     Breathing concerns    Reason for Disposition    MILD difficulty breathing (e.g., minimal/no SOB at rest, SOB with walking, pulse < 100) of new onset or worse than normal    Additional Information    Negative: Breathing stopped and hasn't returned    Negative: Choking on something    Negative: SEVERE difficulty breathing (e.g., struggling for each breath, speaks in single words, pulse > 120)    Negative: Bluish (or gray) lips or face    Negative: Difficult to awaken or acting confused (e.g., disoriented, slurred speech)    Negative: Passed out (i.e., fainted, collapsed and was not responding)     "Negative: Wheezing started suddenly after medicine, an allergic food, or bee sting    Negative: Stridor    Negative: Slow, shallow and weak breathing    Negative: Sounds like a life-threatening emergency to the triager    Negative: Chest pain    Negative: Wheezing (high pitched whistling sound) and previous asthma attacks or use of asthma medicines    Negative: Difficulty breathing and only present when coughing    Negative: Difficulty breathing and only from stuffy or runny nose    Negative: Difficulty breathing and within 14 days of COVID-19 Exposure    Negative: MODERATE difficulty breathing (e.g., speaks in phrases, SOB even at rest, pulse 100-120) of new onset or worse than normal    Negative: Wheezing can be heard across the room    Negative: Drooling or spitting out saliva (because can't swallow)    Negative: Any history of prior \"blood clot\" in leg or lungs    Negative: Illness requiring prolonged bedrest in past month (e.g., immobilization, long hospital stay)    Negative: Hip or leg fracture (broken bone) in past month (or had cast on leg or ankle in past month)    Negative: Major surgery in the past month    Negative: Long-distance travel in past month (e.g., car, bus, train, plane; with trip lasting 6 or more hours)    Negative: Extra heart beats OR irregular heart beating   (i.e., \"palpitations\")    Negative: Fever > 103 F (39.4 C)    Negative: Fever > 101 F (38.3 C) and over 60 years of age    Negative: Fever > 100.0 F (37.8 C) and bedridden (e.g., nursing home patient, stroke, chronic illness, recovering from surgery)    Negative: Fever > 100.0 F (37.8 C) and diabetes mellitus or weak immune system (e.g., HIV positive, cancer chemo, splenectomy, organ transplant, chronic steroids)    Negative: Periods where breathing stops and then resumes normally and bedridden (e.g., nursing home patient, CVA)    Negative: Pregnant or postpartum (from 0 to 6 weeks after delivery)    Negative: Patient sounds very " "sick or weak to the triager    Answer Assessment - Initial Assessment Questions  1. RESPIRATORY STATUS: \"Describe your breathing?\" (e.g., wheezing, shortness of breath, unable to speak, severe coughing)       Short of breath with activities    2. ONSET: \"When did this breathing problem begin?\"       5/24    3. PATTERN \"Does the difficult breathing come and go, or has it been constant since it started?\"       With acitivity    4. SEVERITY: \"How bad is your breathing?\" (e.g., mild, moderate, severe)     - MILD: No SOB at rest, mild SOB with walking, speaks normally in sentences, can lay down, no retractions, pulse < 100.     - MODERATE: SOB at rest, SOB with minimal exertion and prefers to sit, cannot lie down flat, speaks in phrases, mild retractions, audible wheezing, pulse 100-120.     - SEVERE: Very SOB at rest, speaks in single words, struggling to breathe, sitting hunched forward, retractions, pulse > 120       mild    5. RECURRENT SYMPTOM: \"Have you had difficulty breathing before?\" If so, ask: \"When was the last time?\" and \"What happened that time?\"       no    6. CARDIAC HISTORY: \"Do you have any history of heart disease?\" (e.g., heart attack, angina, bypass surgery, angioplasty)       no    7. LUNG HISTORY: \"Do you have any history of lung disease?\"  (e.g., pulmonary embolus, asthma, emphysema)      Asthma - inhaler    8. CAUSE: \"What do you think is causing the breathing problem?\"       Unsure,    9. OTHER SYMPTOMS: \"Do you have any other symptoms? (e.g., dizziness, runny nose, cough, chest pain, fever)      No dizziness, coughing some, no chest pain or fever. Not coughing very often, mostly at night, couple times during the day, sometimes phlegm, unsure of color. Coughing started Monday as well. Has not been tested for covid recently. Not aware of any exposure. Is vaccinated. No headache, loss of taste and smell since 5 weeks ago from chemo. No muscle aches or fatigue.     10. PREGNANCY: \"Is there any " "chance you are pregnant?\" \"When was your last menstrual period?\"        No    11. TRAVEL: \"Have you traveled out of the country in the last month?\" (e.g., travel history, exposures)        no     Of important note just finished chemotherapy for breast cancer.  Denies calf pain chest pain.  Shortness of breath described is inability to take a deep breath in worsening with activity.  Reports some nasal congestion intermittent cough when she is lying flat.  Covid vaccinated.    Transfer to Acute and Diagnostic Services  I have contacted the staff at the [facility] at [phone] ([place]) to confirm patient acceptance.  Special Needs: None    Discussed transition to Acute & Diagnostic Services Clinic, and patient agrees with next level of care.  Patient transportation will be provided by the patient.    Review of Systems   Constitutional, HEENT, cardiovascular, pulmonary, GI, , musculoskeletal, neuro, skin, endocrine and psych systems are negative, except as otherwise noted in the HPI.      Objective    BP 92/60   Pulse 89   Temp 95.9  F (35.5  C)   Resp 20   Ht 1.626 m (5' 4\")   Wt 49.4 kg (109 lb)   LMP 05/08/2018 (Exact Date)   SpO2 99%   BMI 18.71 kg/m    Body mass index is 18.71 kg/m .  Physical Exam   GENERAL: healthy, alert and no distress  RESP: no rales, rhonchi or wheezes; lungs clear to auscultation but she reports feeling like she cannot take a full breath in-denies that this is painful  CV: regular rate and rhythm, normal S1 S2, no S3 or S4, no murmur, click or rub, no peripheral edema and peripheral pulses strong  ABDOMEN: soft, nontender, no hepatosplenomegaly, no masses and bowel sounds normal  MS: no gross musculoskeletal defects noted, no edema  SKIN: no suspicious lesions or rashes  NEURO: Normal strength and tone, mentation intact and speech normal  PSYCH: mentation appears normal, affect normal/bright    EKG - Reviewed and interpreted by me " appears normal, NSR, normal axis, normal intervals, no acute ST/T changes c/w ischemia, no LVH by voltage criteria, ST depression in V1 and V2, nonspecific ST-T change from previous EKG.

## 2021-05-27 NOTE — TELEPHONE ENCOUNTER
Patient sent [employer] message describing shortness of breath with activity and unable to take a deep breath. Called and spoke with patient. No  used, patient declined. Denies chest pain or pain with a deep breath, just describes her lung capacity being reduced. Also has been coughing a little. Symptoms started on Monday 5/24. Finished chemo on 5/12.     Appointment made for tomorrow morning.  Next 5 appointments (look out 90 days)    May 28, 2021  8:30 AM  SHORT with ALICIA Gallo CNP  [facility] ([facility] ) [address] [place] [id number]  [phone]         Message handled by Nurse Triage with [employer] - provider name: Dr. Mcdonnell.    CALL BACK OR GO TO ED IF:  * Severe difficulty breathing occurs  * Fever more than 100.4 F (38.0 C)  * You become worse    Deirdre Covarrubias RN  [facility]      Reason for Disposition    MILD difficulty breathing (e.g., minimal/no SOB at rest, SOB with walking, pulse < 100) of new onset or worse than normal    Additional Information    Negative: Breathing stopped and hasn't returned    Negative: Choking on something    Negative: SEVERE difficulty breathing (e.g., struggling for each breath, speaks in single words, pulse > 120)    Negative: Bluish (or gray) lips or face    Negative: Difficult to awaken or acting confused (e.g., disoriented, slurred speech)    Negative: Passed out (i.e., fainted, collapsed and was not responding)    Negative: Wheezing started suddenly after medicine, an allergic food, or bee sting    Negative: Stridor    Negative: Slow, shallow and weak breathing    Negative: Sounds like a life-threatening emergency to the triager    Negative: Chest pain    Negative: Wheezing (high pitched whistling sound) and previous asthma attacks or use of asthma medicines    Negative: Difficulty breathing and only present when coughing    Negative: Difficulty  "breathing and only from stuffy or runny nose    Negative: Difficulty breathing and within 14 days of COVID-19 Exposure    Negative: MODERATE difficulty breathing (e.g., speaks in phrases, SOB even at rest, pulse 100-120) of new onset or worse than normal    Negative: Wheezing can be heard across the room    Negative: Drooling or spitting out saliva (because can't swallow)    Negative: Any history of prior \"blood clot\" in leg or lungs    Negative: Illness requiring prolonged bedrest in past month (e.g., immobilization, long hospital stay)    Negative: Hip or leg fracture (broken bone) in past month (or had cast on leg or ankle in past month)    Negative: Major surgery in the past month    Negative: Long-distance travel in past month (e.g., car, bus, train, plane; with trip lasting 6 or more hours)    Negative: Extra heart beats OR irregular heart beating   (i.e., \"palpitations\")    Negative: Fever > 103 F (39.4 C)    Negative: Fever > 101 F (38.3 C) and over 60 years of age    Negative: Fever > 100.0 F (37.8 C) and bedridden (e.g., nursing home patient, stroke, chronic illness, recovering from surgery)    Negative: Fever > 100.0 F (37.8 C) and diabetes mellitus or weak immune system (e.g., HIV positive, cancer chemo, splenectomy, organ transplant, chronic steroids)    Negative: Periods where breathing stops and then resumes normally and bedridden (e.g., nursing home patient, CVA)    Negative: Pregnant or postpartum (from 0 to 6 weeks after delivery)    Negative: Patient sounds very sick or weak to the triager    Answer Assessment - Initial Assessment Questions  1. RESPIRATORY STATUS: \"Describe your breathing?\" (e.g., wheezing, shortness of breath, unable to speak, severe coughing)       Short of breath with activities    2. ONSET: \"When did this breathing problem begin?\"       5/24    3. PATTERN \"Does the difficult breathing come and go, or has it been constant since it started?\"       With acitivity    4. SEVERITY: " "\"How bad is your breathing?\" (e.g., mild, moderate, severe)     - MILD: No SOB at rest, mild SOB with walking, speaks normally in sentences, can lay down, no retractions, pulse < 100.     - MODERATE: SOB at rest, SOB with minimal exertion and prefers to sit, cannot lie down flat, speaks in phrases, mild retractions, audible wheezing, pulse 100-120.     - SEVERE: Very SOB at rest, speaks in single words, struggling to breathe, sitting hunched forward, retractions, pulse > 120       mild    5. RECURRENT SYMPTOM: \"Have you had difficulty breathing before?\" If so, ask: \"When was the last time?\" and \"What happened that time?\"       no    6. CARDIAC HISTORY: \"Do you have any history of heart disease?\" (e.g., heart attack, angina, bypass surgery, angioplasty)       no    7. LUNG HISTORY: \"Do you have any history of lung disease?\"  (e.g., pulmonary embolus, asthma, emphysema)      Asthma - inhaler    8. CAUSE: \"What do you think is causing the breathing problem?\"       Unsure,    9. OTHER SYMPTOMS: \"Do you have any other symptoms? (e.g., dizziness, runny nose, cough, chest pain, fever)      No dizziness, coughing some, no chest pain or fever. Not coughing very often, mostly at night, couple times during the day, sometimes phlegm, unsure of color. Coughing started Monday as well. Has not been tested for covid recently. Not aware of any exposure. Is vaccinated. No headache, loss of taste and smell since 5 weeks ago from chemo. No muscle aches or fatigue.     10. PREGNANCY: \"Is there any chance you are pregnant?\" \"When was your last menstrual period?\"        No    11. TRAVEL: \"Have you traveled out of the country in the last month?\" (e.g., travel history, exposures)        no    Protocols used: BREATHING DIFFICULTY-A-OH      "

## 2021-05-28 ENCOUNTER — OFFICE VISIT (OUTPATIENT)
Dept: PEDIATRICS | Facility: CLINIC | Age: 51
End: 2021-05-28
Attending: NURSE PRACTITIONER
Payer: COMMERCIAL

## 2021-05-28 ENCOUNTER — OFFICE VISIT (OUTPATIENT)
Dept: FAMILY MEDICINE | Facility: CLINIC | Age: 51
End: 2021-05-28
Payer: COMMERCIAL

## 2021-05-28 ENCOUNTER — HOSPITAL ENCOUNTER (OUTPATIENT)
Dept: CT IMAGING | Facility: CLINIC | Age: 51
Discharge: HOME | End: 2021-05-28
Attending: NURSE PRACTITIONER | Admitting: NURSE PRACTITIONER
Payer: COMMERCIAL

## 2021-05-28 VITALS
WEIGHT: 109 LBS | RESPIRATION RATE: 20 BRPM | DIASTOLIC BLOOD PRESSURE: 60 MMHG | TEMPERATURE: 95.9 F | HEIGHT: 64 IN | BODY MASS INDEX: 18.61 KG/M2 | OXYGEN SATURATION: 99 % | SYSTOLIC BLOOD PRESSURE: 92 MMHG | HEART RATE: 89 BPM

## 2021-05-28 VITALS
OXYGEN SATURATION: 97 % | WEIGHT: 109 LBS | RESPIRATION RATE: 18 BRPM | HEART RATE: 82 BPM | BODY MASS INDEX: 18.71 KG/M2 | DIASTOLIC BLOOD PRESSURE: 61 MMHG | TEMPERATURE: 97.9 F | SYSTOLIC BLOOD PRESSURE: 97 MMHG

## 2021-05-28 DIAGNOSIS — R06.02 SHORTNESS OF BREATH: ICD-10-CM

## 2021-05-28 DIAGNOSIS — C50.911 INVASIVE DUCTAL CARCINOMA OF BREAST, RIGHT (H): ICD-10-CM

## 2021-05-28 DIAGNOSIS — R06.02 SHORTNESS OF BREATH: Primary | ICD-10-CM

## 2021-05-28 DIAGNOSIS — R94.31 NONSPECIFIC ABNORMAL ELECTROCARDIOGRAM (ECG) (EKG): ICD-10-CM

## 2021-05-28 LAB
ALBUMIN SERPL-MCNC: 3.6 G/DL (ref 3.4–5)
ALP SERPL-CCNC: 112 U/L (ref 40–150)
ALT SERPL W P-5'-P-CCNC: 23 U/L (ref 0–50)
ANION GAP SERPL CALCULATED.3IONS-SCNC: 5 MMOL/L (ref 3–14)
AST SERPL W P-5'-P-CCNC: 16 U/L (ref 0–45)
BASOPHILS # BLD AUTO: 0.1 10E9/L (ref 0–0.2)
BASOPHILS NFR BLD AUTO: 0.8 %
BILIRUB SERPL-MCNC: 0.5 MG/DL (ref 0.2–1.3)
BUN SERPL-MCNC: 6 MG/DL (ref 7–30)
CALCIUM SERPL-MCNC: 8.9 MG/DL (ref 8.5–10.1)
CHLORIDE SERPL-SCNC: 107 MMOL/L (ref 94–109)
CO2 SERPL-SCNC: 26 MMOL/L (ref 20–32)
CREAT SERPL-MCNC: 0.6 MG/DL (ref 0.52–1.04)
D DIMER PPP FEU-MCNC: 0.8 UG/ML FEU (ref 0–0.5)
DIFFERENTIAL METHOD BLD: ABNORMAL
EOSINOPHIL # BLD AUTO: 0.1 10E9/L (ref 0–0.7)
EOSINOPHIL NFR BLD AUTO: 1 %
ERYTHROCYTE [DISTWIDTH] IN BLOOD BY AUTOMATED COUNT: 15 % (ref 10–15)
GFR SERPL CREATININE-BSD FRML MDRD: >90 ML/MIN/{1.73_M2}
GLUCOSE SERPL-MCNC: 82 MG/DL (ref 70–99)
HCT VFR BLD AUTO: 36.3 % (ref 35–47)
HGB BLD-MCNC: 11.5 G/DL (ref 11.7–15.7)
IMM GRANULOCYTES # BLD: 0.1 10E9/L (ref 0–0.4)
IMM GRANULOCYTES NFR BLD: 0.7 %
LABORATORY COMMENT REPORT: NORMAL
LYMPHOCYTES # BLD AUTO: 0.8 10E9/L (ref 0.8–5.3)
LYMPHOCYTES NFR BLD AUTO: 8.6 %
MCH RBC QN AUTO: 30.3 PG (ref 26.5–33)
MCHC RBC AUTO-ENTMCNC: 31.7 G/DL (ref 31.5–36.5)
MCV RBC AUTO: 96 FL (ref 78–100)
MONOCYTES # BLD AUTO: 0.5 10E9/L (ref 0–1.3)
MONOCYTES NFR BLD AUTO: 5.4 %
NEUTROPHILS # BLD AUTO: 7.4 10E9/L (ref 1.6–8.3)
NEUTROPHILS NFR BLD AUTO: 83.5 %
NRBC # BLD AUTO: 0 10*3/UL
NRBC BLD AUTO-RTO: 0 /100
PLATELET # BLD AUTO: 272 10E9/L (ref 150–450)
POTASSIUM SERPL-SCNC: 3.8 MMOL/L (ref 3.4–5.3)
PROT SERPL-MCNC: 7.3 G/DL (ref 6.8–8.8)
RBC # BLD AUTO: 3.79 10E12/L (ref 3.8–5.2)
SARS-COV-2 RNA RESP QL NAA+PROBE: NEGATIVE
SARS-COV-2 RNA RESP QL NAA+PROBE: NORMAL
SODIUM SERPL-SCNC: 138 MMOL/L (ref 133–144)
SPECIMEN SOURCE: NORMAL
SPECIMEN SOURCE: NORMAL
TROPONIN I SERPL-MCNC: <0.015 UG/L (ref 0–0.04)
WBC # BLD AUTO: 8.8 10E9/L (ref 4–11)

## 2021-05-28 PROCEDURE — U0005 INFEC AGEN DETEC AMPLI PROBE: HCPCS | Performed by: NURSE PRACTITIONER

## 2021-05-28 PROCEDURE — 99215 OFFICE O/P EST HI 40 MIN: CPT | Performed by: NURSE PRACTITIONER

## 2021-05-28 PROCEDURE — 99417 PROLNG OP E/M EACH 15 MIN: CPT | Performed by: NURSE PRACTITIONER

## 2021-05-28 PROCEDURE — 93000 ELECTROCARDIOGRAM COMPLETE: CPT | Performed by: NURSE PRACTITIONER

## 2021-05-28 PROCEDURE — 85379 FIBRIN DEGRADATION QUANT: CPT | Performed by: NURSE PRACTITIONER

## 2021-05-28 PROCEDURE — 85025 COMPLETE CBC W/AUTO DIFF WBC: CPT | Performed by: NURSE PRACTITIONER

## 2021-05-28 PROCEDURE — 84484 ASSAY OF TROPONIN QUANT: CPT | Performed by: NURSE PRACTITIONER

## 2021-05-28 PROCEDURE — U0003 INFECTIOUS AGENT DETECTION BY NUCLEIC ACID (DNA OR RNA); SEVERE ACUTE RESPIRATORY SYNDROME CORONAVIRUS 2 (SARS-COV-2) (CORONAVIRUS DISEASE [COVID-19]), AMPLIFIED PROBE TECHNIQUE, MAKING USE OF HIGH THROUGHPUT TECHNOLOGIES AS DESCRIBED BY CMS-2020-01-R: HCPCS | Performed by: NURSE PRACTITIONER

## 2021-05-28 PROCEDURE — 80053 COMPREHEN METABOLIC PANEL: CPT | Performed by: NURSE PRACTITIONER

## 2021-05-28 PROCEDURE — 99207 PR FIRST ORDER ACUTE REFERRAL: CPT | Performed by: NURSE PRACTITIONER

## 2021-05-28 PROCEDURE — 250N000011 HC RX IP 250 OP 636: Performed by: NURSE PRACTITIONER

## 2021-05-28 PROCEDURE — 71275 CT ANGIOGRAPHY CHEST: CPT

## 2021-05-28 PROCEDURE — 36415 COLL VENOUS BLD VENIPUNCTURE: CPT | Performed by: NURSE PRACTITIONER

## 2021-05-28 PROCEDURE — 250N000009 HC RX 250: Performed by: NURSE PRACTITIONER

## 2021-05-28 RX ORDER — IOPAMIDOL 755 MG/ML
100 INJECTION, SOLUTION INTRAVASCULAR ONCE
Status: COMPLETED | OUTPATIENT
Start: 2021-05-28 | End: 2021-05-28

## 2021-05-28 RX ADMIN — SODIUM CHLORIDE 71 ML: 9 INJECTION, SOLUTION INTRAVENOUS at 10:29

## 2021-05-28 RX ADMIN — IOPAMIDOL 50 ML: 755 INJECTION, SOLUTION INTRAVENOUS at 10:26

## 2021-05-28 ASSESSMENT — ENCOUNTER SYMPTOMS
NEUROLOGICAL NEGATIVE: 1
HEARTBURN: 0
FATIGUE: 1
CHILLS: 0
DIAPHORESIS: 1
CHEST TIGHTNESS: 1
WHEEZING: 0
DIARRHEA: 0
HEMATOLOGIC/LYMPHATIC NEGATIVE: 1
UNEXPECTED WEIGHT CHANGE: 0
MUSCULOSKELETAL NEGATIVE: 1
HEMATOCHEZIA: 0
FEVER: 0
SHORTNESS OF BREATH: 1
PALPITATIONS: 0
COUGH: 1
NAUSEA: 0
ABDOMINAL PAIN: 0
EYES NEGATIVE: 1
VOMITING: 0
CONSTIPATION: 0

## 2021-05-28 ASSESSMENT — MIFFLIN-ST. JEOR: SCORE: 1094.42

## 2021-05-28 NOTE — PATIENT INSTRUCTIONS
Patient Education     Shortness of Breath (Dyspnea)  Shortness of breath is the feeling that you can't catch your breath or get enough air. It is also known as dyspnea.  Dyspnea can be caused by many different conditions. They include:    Acute asthma attack    Worsening of chronic lung diseases such as chronic bronchitis and emphysema    Heart failure. This is when weak heart muscle allows extra fluid to collect in the lungs.    Panic attacks or anxiety. Fear can cause rapid breathing (hyperventilation).    Pneumonia, or an infection in the lung tissue    Exposure to toxic substances, fumes, smoke, or certain medicines    Blood clot in the lung (pulmonary embolism). This is often from a piece of blood clot in a deep vein of the leg (deep vein thrombosis) that breaks off and travels to the lungs.    Heart attack or heart-related chest pain (angina)    Anemia    Collapsed lung (pneumothorax)    Dehydration    Pregnancy  Based on your visit today, the exact cause of your shortness of breath is not certain. Your tests don t show any of the serious causes of dyspnea. You may need other tests to find out if you have a serious problem. It s important to watch for any new symptoms or symptoms that get worse. Follow up with your healthcare provider as directed.  Home care  Follow these tips to take care of yourself at home:    When your symptoms are better, go back to your usual activities.    If you smoke, you should stop. Join a quit-smoking program or ask your healthcare provider for help.    Eat a healthy diet and get plenty of sleep.    Get regular exercise. Talk with your healthcare provider before starting to exercise, especially if you have other medical problems.    Cut down on the amount of caffeine and stimulants you consume.  Follow-up care  Follow up with your healthcare provider, or as advised.  If tests were done, you will be told if your treatment needs to be changed. You can call as directed for the  results.  If an X-ray was taken, a specialist will review it. You will be notified of any new findings that may affect your care.  Call 911  Shortness of breath may be a sign of a serious medical problem. For example, it may be a problem with your heart or lungs. Call 911 if you have worsening shortness of breath or trouble breathing, especially with any of the symptoms below:    Confusion or difficulty waking    Fainting or loss of consciousness.    Fast or irregular heartbeat    Coughing up blood    Pain in your chest, arm, shoulder, neck, or upper back    Sweating  When to seek medical advice  Call your healthcare provider right away if any of these occur:    Slight shortness of breath or wheezing    Redness, pain or swelling in your leg, arm, or other body area    Swelling in both legs or ankles    Fast weight gain    Dizziness or weakness    Fever of 100.4 F (38 C) or higher, or as directed by your healthcare provider  Deepa last reviewed this educational content on 6/1/2018 2000-2021 [employer]. All rights reserved. This information is not intended as a substitute for professional medical care. Always follow your healthcare professional's instructions.

## 2021-05-28 NOTE — PATIENT INSTRUCTIONS
Your scan was negative for any clots in the lungs. This is good news. The rest of your labs looked good with the exception of your hemoglobin being slightly lower, but not critical.     Could be your shortness of breath is due to a lower hemoglobin due to the chemo and this will improve over time. If you change your mind about supplemental iron, you can take a multivitamin with Iron daily.    Your COVID test is still pending. This result will be available on My Chart. You will be call if the test is positive, not if it is negative though.     You have a recheck on Tuesday with Arlene Matamoros PA-C for a recheck on today's visit. You can revisit the possibility of getting an echocardiogram should you still feel short of breath.     If you have any chest pain, increased shortness of breath, pain or swelling in the calves this weekend, please go to the ER.

## 2021-05-28 NOTE — PROGRESS NOTES
Assessment & Plan     Shortness of breath    CT chest negative for any clots today.     D-dimer mildly elevated most likely due to recent chemo    HGB 11.5 today that is down from 5 months ago when it was 15.7. We discussed starting some Iron, but she declined. Can take multivitamin with Iron daily should she change her mind. Lower HGB could be aiding in her JACOBSEN.     We discussed getting a echo cardiogram and she declined. Can readdress this on Tuesday when she has a follow up appointment with Arlene Matamoros. EKG done in clinic prior to [facility] with slightly depressed T waves in leads I and II, but had this on EKG back in 2018 as well. She has had no chest pain and Troponin today was negative.     COVID test pending. Not a high suspicion for this     - Referral to Acute and Diagnostic Services (Day of diagnostic / First order acute)  - CBC with platelets differential  - Comprehensive metabolic panel  - D dimer quantitative  - Troponin I  - CT Chest Pulmonary Embolism w Contrast; Future  - IV access  - sodium chloride (PF) 0.9% PF flush 3 mL    Invasive ductal carcinoma of breast, right (H)-invasive ductal carcinoma, grade 2, ER +, CT +, [id number] negative       - Referral to Acute and Diagnostic Services (Day of diagnostic / First order acute)  - CBC with platelets differential  - Comprehensive metabolic panel  - D dimer quantitative  - Troponin I  - CT Chest Pulmonary Embolism w Contrast; Future  - IV access  - sodium chloride (PF) 0.9% PF flush 3 mL    Review of the result(s) of each unique test - Troponin, D-dimer, CMP and CBC  Ordering of each unique test  90 minutes spent on the date of the encounter doing chart review, review of test results, interpretation of tests, patient visit and documentation        See Patient Instructions    Return for Follow up on Tuesday as scheduled. .    Melinda Ferrell, [facility] JUANITA Llanos is a 51 year old who presents for the following  "health issues     HPI     Concern - Shortness of Breath  Onset: X 5 days  Description: SOB started 5 days ago, denies any chest pain  Intensity: moderate  Progression of Symptoms:  same  Accompanying Signs & Symptoms: intermittent cough, dry and productive, worse at night. No fevers or chills but occasional sweats. No history of asthma.   Previous history of similar problem: none  Precipitating factors:        Worsened by: walking, stairs  Alleviating factors:        Improved by: relaxing, not moving  Therapies tried and outcome: Seen at clinic today and was referred to us at [facility] for further testing. Had COVID swab and EKG done in clinic. EKG with mildly depressed T waves in leads I and II otherwise normal.   Feels she can not take a deep breath \"lungs feel tight\". Had noticed dyspnea with exertion like walking up a flight of stairs 3 weeks ago. Now it is with rest as well.   Just finished 6 weeks of chemo and will start radiation in 1-2 weeks for Invasive ductal carcinoma of the right breast- had lumpectomy.       Review of Systems   Constitutional: Positive for diaphoresis and fatigue. Negative for chills, fever and unexpected weight change.   HENT: Negative.    Eyes: Negative.    Respiratory: Positive for cough, chest tightness and shortness of breath. Negative for wheezing.    Cardiovascular: Negative for chest pain, palpitations and peripheral edema.   Gastrointestinal: Negative for abdominal pain, constipation, diarrhea, heartburn, hematochezia, nausea and vomiting.   Genitourinary: Negative.    Musculoskeletal: Negative.    Neurological: Negative.    Hematological: Negative.             Objective    Wt 49.4 kg (109 lb)   LMP 05/08/2018 (Exact Date)   BMI 18.71 kg/m    Body mass index is 18.71 kg/m .  Physical Exam  Vitals signs reviewed.   Constitutional:       General: She is not in acute distress.  HENT:      Head: Normocephalic and atraumatic.   Eyes:      Extraocular Movements: Extraocular movements " intact.      Conjunctiva/sclera: Conjunctivae normal.      Pupils: Pupils are equal, round, and reactive to light.   Neck:      Musculoskeletal: Normal range of motion and neck supple. No muscular tenderness.   Cardiovascular:      Rate and Rhythm: Normal rate and regular rhythm.      Pulses: Normal pulses.      Heart sounds: Normal heart sounds. No murmur.      Comments: No calf swelling or tenderness. Negative homans sign bilaterally.   Pulmonary:      Effort: Pulmonary effort is normal. No respiratory distress.      Breath sounds: Normal breath sounds.   Chest:      Chest wall: No tenderness.   Abdominal:      General: Abdomen is flat. Bowel sounds are normal.      Palpations: Abdomen is soft. There is no mass.      Tenderness: There is no abdominal tenderness. There is no right CVA tenderness or left CVA tenderness.   Musculoskeletal: Normal range of motion.   Lymphadenopathy:      Cervical: No cervical adenopathy.   Skin:     General: Skin is warm and dry.      Capillary Refill: Capillary refill takes less than 2 seconds.   Neurological:      General: No focal deficit present.      Mental Status: She is alert and oriented to person, place, and time.      Motor: No weakness.      Coordination: Coordination normal.      Gait: Gait normal.      Deep Tendon Reflexes: Reflexes normal.   Psychiatric:         Mood and Affect: Mood normal.         Thought Content: Thought content normal.          Results for orders placed or performed in visit on 05/28/21 (from the past 24 hour(s))   CBC with platelets differential   Result Value Ref Range    WBC 8.8 4.0 - 11.0 10e9/L    RBC Count 3.79 (L) 3.8 - 5.2 10e12/L    Hemoglobin 11.5 (L) 11.7 - 15.7 g/dL    Hematocrit 36.3 35.0 - 47.0 %    MCV 96 78 - 100 fl    MCH 30.3 26.5 - 33.0 pg    MCHC 31.7 31.5 - 36.5 g/dL    RDW 15.0 10.0 - 15.0 %    Platelet Count 272 150 - 450 10e9/L    Diff Method Automated Method     % Neutrophils 83.5 %    % Lymphocytes 8.6 %    % Monocytes 5.4 %     % Eosinophils 1.0 %    % Basophils 0.8 %    % Immature Granulocytes 0.7 %    Nucleated RBCs 0 0 /100    Absolute Neutrophil 7.4 1.6 - 8.3 10e9/L    Absolute Lymphocytes 0.8 0.8 - 5.3 10e9/L    Absolute Monocytes 0.5 0.0 - 1.3 10e9/L    Absolute Eosinophils 0.1 0.0 - 0.7 10e9/L    Absolute Basophils 0.1 0.0 - 0.2 10e9/L    Abs Immature Granulocytes 0.1 0 - 0.4 10e9/L    Absolute Nucleated RBC 0.0    Comprehensive metabolic panel   Result Value Ref Range    Sodium 138 133 - 144 mmol/L    Potassium 3.8 3.4 - 5.3 mmol/L    Chloride 107 94 - 109 mmol/L    Carbon Dioxide 26 20 - 32 mmol/L    Anion Gap 5 3 - 14 mmol/L    Glucose 82 70 - 99 mg/dL    Urea Nitrogen 6 (L) 7 - 30 mg/dL    Creatinine 0.60 0.52 - 1.04 mg/dL    GFR Estimate >90 >60 mL/min/[1.73_m2]    GFR Estimate If Black >90 >60 mL/min/[1.73_m2]    Calcium 8.9 8.5 - 10.1 mg/dL    Bilirubin Total 0.5 0.2 - 1.3 mg/dL    Albumin 3.6 3.4 - 5.0 g/dL    Protein Total 7.3 6.8 - 8.8 g/dL    Alkaline Phosphatase 112 40 - 150 U/L    ALT 23 0 - 50 U/L    AST 16 0 - 45 U/L   D dimer quantitative   Result Value Ref Range    D Dimer 0.8 (H) 0.0 - 0.50 ug/ml FEU   Troponin I   Result Value Ref Range    Troponin I ES <0.015 0.000 - 0.045 ug/L

## 2021-05-31 NOTE — RESULT ENCOUNTER NOTE
Dear Viet,    Here is a summary of your recent test results:    COVID is negative.    For additional lab test information, labtestsonline.org is an excellent reference.    In addition, here is a list of due or overdue Health Maintenance reminders:    ANNUAL REVIEW OF  ORDERS Never done  Pneumococcal Vaccine(1 of 2 - PPSV23) Never done  Colorectal Cancer Screening Never done  HIV Screening Never done  Zoster (Shingles) Vaccine(1 of 2) Never done  PHQ-2 due on 01/01/2021  Anxiety Assessment due on 05/18/2021  Depression Assessment due on 05/18/2021    Please call us at [phone] (or use [facility]) to address the above recommendations if needed.    Thank you for choosing [facility].  It was an honor and a privilege to participate in your care.       Healthy regards,    Gayle Lee, NANCY  [facility]

## 2021-06-11 ENCOUNTER — TRANSFERRED RECORDS (OUTPATIENT)
Dept: HEALTH INFORMATION MANAGEMENT | Facility: CLINIC | Age: 51
End: 2021-06-11

## 2021-08-06 ENCOUNTER — MYC MEDICAL ADVICE (OUTPATIENT)
Dept: FAMILY MEDICINE | Facility: CLINIC | Age: 51
End: 2021-08-06

## 2021-08-12 ENCOUNTER — TRANSFERRED RECORDS (OUTPATIENT)
Dept: HEALTH INFORMATION MANAGEMENT | Facility: CLINIC | Age: 51
End: 2021-08-12

## 2021-08-13 NOTE — TELEPHONE ENCOUNTER
Made appointment for 8/26 at 3:40pm.  Patient is on vacation through the 22nd.        Valarie Bustillo

## 2021-08-13 NOTE — TELEPHONE ENCOUNTER
Recommend virtual visit to discuss - need to taper off sertraline and discuss effexor ( venlafaxine).   Please assist pt in making appt(s) for the above.   In the next week or so.

## 2021-08-23 ENCOUNTER — TRANSFERRED RECORDS (OUTPATIENT)
Dept: SURGERY | Facility: CLINIC | Age: 51
End: 2021-08-23

## 2021-08-24 ASSESSMENT — ANXIETY QUESTIONNAIRES
GAD7 TOTAL SCORE: 0
6. BECOMING EASILY ANNOYED OR IRRITABLE: NOT AT ALL
5. BEING SO RESTLESS THAT IT IS HARD TO SIT STILL: NOT AT ALL
1. FEELING NERVOUS, ANXIOUS, OR ON EDGE: NOT AT ALL
8. IF YOU CHECKED OFF ANY PROBLEMS, HOW DIFFICULT HAVE THESE MADE IT FOR YOU TO DO YOUR WORK, TAKE CARE OF THINGS AT HOME, OR GET ALONG WITH OTHER PEOPLE?: NOT DIFFICULT AT ALL
GAD7 TOTAL SCORE: 0
7. FEELING AFRAID AS IF SOMETHING AWFUL MIGHT HAPPEN: NOT AT ALL
7. FEELING AFRAID AS IF SOMETHING AWFUL MIGHT HAPPEN: NOT AT ALL
4. TROUBLE RELAXING: NOT AT ALL
3. WORRYING TOO MUCH ABOUT DIFFERENT THINGS: NOT AT ALL
2. NOT BEING ABLE TO STOP OR CONTROL WORRYING: NOT AT ALL
GAD7 TOTAL SCORE: 0

## 2021-08-24 ASSESSMENT — PATIENT HEALTH QUESTIONNAIRE - PHQ9
SUM OF ALL RESPONSES TO PHQ QUESTIONS 1-9: 4
10. IF YOU CHECKED OFF ANY PROBLEMS, HOW DIFFICULT HAVE THESE PROBLEMS MADE IT FOR YOU TO DO YOUR WORK, TAKE CARE OF THINGS AT HOME, OR GET ALONG WITH OTHER PEOPLE: NOT DIFFICULT AT ALL
SUM OF ALL RESPONSES TO PHQ QUESTIONS 1-9: 4

## 2021-08-25 ENCOUNTER — TELEPHONE (OUTPATIENT)
Dept: SURGERY | Facility: CLINIC | Age: 51
End: 2021-08-25

## 2021-08-25 ENCOUNTER — PREP FOR PROCEDURE (OUTPATIENT)
Dept: SURGERY | Facility: CLINIC | Age: 51
End: 2021-08-25

## 2021-08-25 DIAGNOSIS — Z11.59 ENCOUNTER FOR SCREENING FOR OTHER VIRAL DISEASES: ICD-10-CM

## 2021-08-25 DIAGNOSIS — C50.919 BREAST CANCER (H): Primary | ICD-10-CM

## 2021-08-25 ASSESSMENT — ANXIETY QUESTIONNAIRES: GAD7 TOTAL SCORE: 0

## 2021-08-25 ASSESSMENT — PATIENT HEALTH QUESTIONNAIRE - PHQ9: SUM OF ALL RESPONSES TO PHQ QUESTIONS 1-9: 4

## 2021-08-25 NOTE — TELEPHONE ENCOUNTER
Type of surgery: POWER PORT A CATHETER REMOVAL       Location of surgery: [facility]  Date and time of surgery: 9/7/2021 @ 1:30 PM   Surgeon: MIRIAM VERMA MD   Pre-Op Appt Date: PATIENT TO SCHEDULE    Post-Op Appt Date: PATIENT TO SCHEDULE     Packet sent out: Yes  Pre-cert/Authorization completed:  Not Applicable  Date:   8/25/2021       POWER PORT A CATHETER REMOVAL       MAC  PT INST TO HAVE H&P WITH DR FUENTES 30 MIN REQ NON DFB NMS

## 2021-08-26 ENCOUNTER — VIRTUAL VISIT (OUTPATIENT)
Dept: FAMILY MEDICINE | Facility: CLINIC | Age: 51
End: 2021-08-26
Payer: COMMERCIAL

## 2021-08-26 DIAGNOSIS — C50.911 INVASIVE DUCTAL CARCINOMA OF BREAST, RIGHT (H): ICD-10-CM

## 2021-08-26 DIAGNOSIS — Z98.890 S/P LUMPECTOMY, RIGHT BREAST: ICD-10-CM

## 2021-08-26 DIAGNOSIS — Z90.710 S/P LAPAROSCOPIC ASSISTED VAGINAL HYSTERECTOMY (LAVH): ICD-10-CM

## 2021-08-26 DIAGNOSIS — F41.1 GENERALIZED ANXIETY DISORDER: ICD-10-CM

## 2021-08-26 DIAGNOSIS — F41.1 ANXIETY STATE: Primary | ICD-10-CM

## 2021-08-26 DIAGNOSIS — C50.911 MALIGNANT NEOPLASM OF RIGHT FEMALE BREAST, UNSPECIFIED ESTROGEN RECEPTOR STATUS, UNSPECIFIED SITE OF BREAST (H): ICD-10-CM

## 2021-08-26 PROCEDURE — 96127 BRIEF EMOTIONAL/BEHAV ASSMT: CPT | Mod: 95 | Performed by: FAMILY MEDICINE

## 2021-08-26 PROCEDURE — 99214 OFFICE O/P EST MOD 30 MIN: CPT | Mod: 95 | Performed by: FAMILY MEDICINE

## 2021-08-26 RX ORDER — SERTRALINE HYDROCHLORIDE 25 MG/1
25 TABLET, FILM COATED ORAL DAILY
Qty: 10 TABLET | Refills: 0 | Status: SHIPPED | OUTPATIENT
Start: 2021-08-26 | End: 2021-09-03

## 2021-08-26 RX ORDER — SERTRALINE HYDROCHLORIDE 100 MG/1
50 TABLET, FILM COATED ORAL DAILY
Qty: 5 TABLET | Refills: 1 | COMMUNITY
Start: 2021-08-26 | End: 2021-09-03

## 2021-08-26 RX ORDER — VENLAFAXINE HYDROCHLORIDE 37.5 MG/1
37.5 TABLET, EXTENDED RELEASE ORAL DAILY
Qty: 60 TABLET | Refills: 3 | Status: SHIPPED | OUTPATIENT
Start: 2021-08-26 | End: 2021-09-02

## 2021-08-26 NOTE — PROGRESS NOTES
Viet is a 51 year old who is being evaluated via a billable telephone visit.      What phone number would you like to be contacted at? [phone] (home)    How would you like to obtain your AVS? MyChart    Assessment & Plan      ICD-10-CM    1. Anxiety state  F41.1 sertraline (ZOLOFT) 25 MG tablet     venlafaxine (EFFEXOR-ER) 37.5 MG 24 hr tablet     sertraline (ZOLOFT) 100 MG tablet   2. Malignant neoplasm of right female breast, unspecified estrogen receptor status, unspecified site of breast (H) - starting tamoxifen-second CA identified same breast; lobular carcinoma  C50.911 DX Hip/Pelvis/Spine   3. Generalized anxiety disorder  F41.1    4. Invasive ductal carcinoma of breast, right (H)  C50.911    5. S/P laparoscopic assisted vaginal hysterectomy (LAVH)- 5/21/2018 - ovaries left in place - cervix removed - secondary to fibroid uterus and menometrorrhagia   Z90.710    6. S/P lumpectomy, right breast = for ER+, [place]+ , HER2 - invasive ductal ca. - s/p chemo w/ Taxotere and radiation - starting on tamoxifen late 8/2021 after off sertraline   Z98.890    7. Invasive ductal carcinoma of breast, right (H)-invasive ductal carcinoma, grade 2, ER +, ND +, Tov3rbr negative     C50.911         Patient will cut her current 100 mg sertraline tablets in half and take 1/2 tablet equaling 50 mg daily for 5 days, then decrease to one 25 mg tablet of sertraline daily for 5 to 7 days, then stop.  Be off sertraline altogether for at least 2 days prior to starting venlafaxine ER 37.5 mg by mouth daily for 1 to 2 weeks.  If not at desired effect after 2 weeks may increase to 2 tablets taken together of 37.5 mg venlafaxine ER daily equaling 75 mg total.      Return in 8 days (on 9/3/2021) for preop for port removal with Dr. Mcdonnell . - appt scheduled with me for 2pm.     Patient will contact her oncologist's office at [facility] -for guidance on when to start her tamoxifen.  She will walk into our pharmacy for her third [employer] vaccine  booster of the Moderna shot within the next week or so as she is considered to be immunosuppressed actively undergoing cancer treatment.  We confirm that she was eligible with our pharmacist today.  She will come in between 9 AM and noon or 1:30pm  to 4:30 PM Monday through Friday for this.      Ordering of each unique test  Prescription drug management     Reviewed pt's last 2 notes with Dr. Boss's office at [facility] for her breast cancer treatment.     38 minutes spent on the date of the encounter doing chart review, history and exam, documentation and further activities per the note       MEDICATIONS:   Orders Placed This Encounter   Medications     sertraline (ZOLOFT) 25 MG tablet     Sig: Take 1 tablet (25 mg) by mouth daily     Dispense:  10 tablet     Refill:  0     venlafaxine (EFFEXOR-ER) 37.5 MG 24 hr tablet     Sig: Take 1 tablet (37.5 mg) by mouth daily For 1-2 weeks, then increase to 2 tabs daily , if not at desired effect     Dispense:  60 tablet     Refill:  3     After off sertraline for 2 days     sertraline (ZOLOFT) 100 MG tablet     Sig: Take 0.5 tablets (50 mg) by mouth daily for 5 days     Dispense:  5 tablet     Refill:  1          - Continue other medications without change  Regular exercise  See Patient Instructions    Return in 8 days (on 9/3/2021) for preop for port removal with Dr. Mcdonnell .          Giselle Mcdonnell MD  [facility] PRIOR ASHUTOSH Llanos is a 51 year old who presents for the following health issues:   1. Anxiety state    2. Malignant neoplasm of right female breast, unspecified estrogen receptor status, unspecified site of breast (H) - starting tamoxifen-second CA identified same breast; lobular carcinoma    3. Generalized anxiety disorder    4. Invasive ductal carcinoma of breast, right (H)    5. S/P laparoscopic assisted vaginal hysterectomy (LAVH)- 5/21/2018 - ovaries left in place - cervix removed - secondary to fibroid uterus and  menometrorrhagia     6. S/P lumpectomy, right breast = for ER+, [place]+ , HER2 - invasive ductal ca. - s/p chemo w/ Taxotere and radiation - starting on tamoxifen late 8/2021 after off sertraline     7. Invasive ductal carcinoma of breast, right (H)-invasive ductal carcinoma, grade 2, ER +, [place] +, Rfo3nvm negative          S/P lumpectomy, right breast 12/29/2020= for ER+, MN+ , HER2 - invasive ductal ca. - s/p chemo w/ Taxotere and radiation - starting on tamoxifen late 8/2021 after off sertraline per oncology's recommendation as there is a drug-drug interaction between selective serotonin reuptake inhibitor's and tamoxifen.      Needs chemo port removed - scheduled for 9/7/2021 - needs preop prior to that.  Appt scheduled with me for that on 9/3/3021.      History of Present Illness       Mental Health Follow-up:  Patient presents to follow-up on Anxiety.    Patient's anxiety since last visit has been:  No change  The patient is not having other symptoms associated with anxiety.  Any significant life events: health concerns  Patient is not feeling anxious or having panic attacks.  Patient has no concerns about alcohol or drug use.     Social History  Tobacco Use    Smoking status: Never Smoker    Smokeless tobacco: Never Used  Alcohol use: Not Currently    Comment: once a week  Drug use: No      Today's PHQ-9         PHQ-9 Total Score:     (P) 4   PHQ-9 Q9 Thoughts of better off dead/self-harm past 2 weeks :   (P) Not at all   Thoughts of suicide or self harm:      Self-harm Plan:        Self-harm Action:          Safety concerns for self or others:             Hasn't started taking tamoxifen yet as she needs to discuss weaning off the medication sertraline 100mg per her oncologist for her breast cancer.      Venlafaxine ( generic Effexor) is suggested as a replacement for the sertraline.       Musculoskeletal problem/pain:   Onset/Duration: x1 wk  Description  Location: mid right side of back   Joint Swelling:  no  Redness: no  Pain: YES  Warmth: no  Intensity:  mild  Progression of Symptoms:  same  Accompanying signs and symptoms:   Fevers: no  Numbness/tingling/weakness: no   History  Trauma to the area: no  Recent illness:  cancer  Previous similar problem: no  Previous evaluation:  no  Precipitating or alleviating factors:  Aggravating factors include: nothing.  Just there all the time  Therapies tried and outcome: nothing    No pain with urination.  Pt did not discuss the above with me, Giselle Mcdonnell MD  At time of her telephone visit.      Past Surgical History:   Procedure Laterality Date     BIOPSY BREAST NEEDLE LOCALIZATION, BIOPSY NODE SENTINEL, COMBINED Right 2020    Procedure: RIGHT BREAST WIRE LOCALIZED LUMPECTOMY, RIGHT SENTINEL NODE BIOPSY;  Surgeon: Randolph Hart MD;  Location: [facility] OR     EYE SURGERY       HYSTERECTOMY, PAP NO LONGER INDICATED  2018     INSERT PORT VASCULAR ACCESS N/A 3/9/2021    Procedure: LEFT SUBCLAVIAN POWER PORT A CATHETER PLACEMENT;  Surgeon: Randolph Hart MD;  Location: [facility] OR     LAPAROSCOPIC ASSISTED HYSTERECTOMY VAGINAL  2018    cervix removed - bilateral ovaries left in place - Dr. Kortney Harris, ob/gyn specialists -secondary to fibroid uterus and menometrorrhagia      [facility] [[email or web address] NONSPECIFIC PROCEDURE  2002           Review of Systems   Constitutional, HEENT, cardiovascular, pulmonary, gi and gu systems are negative, except as otherwise noted.      Objective           Vitals:  No vitals were obtained today due to virtual visit.    Physical Exam   healthy, alert and no distress  PSYCH: Alert and oriented times 3; coherent speech, normal   rate and volume, able to articulate logical thoughts, able   to abstract reason, no tangential thoughts, no hallucinations   or delusions  Her affect is normal  RESP: No cough, no audible wheezing, able to talk in full sentences  Remainder of exam unable to be completed due to telephone visits    Office  Visit on 05/28/2021   Component Date Value Ref Range Status     WBC 05/28/2021 8.8  4.0 - 11.0 10e9/L Final     RBC Count 05/28/2021 3.79* 3.8 - 5.2 10e12/L Final     Hemoglobin 05/28/2021 11.5* 11.7 - 15.7 g/dL Final     Hematocrit 05/28/2021 36.3  35.0 - 47.0 % Final     MCV 05/28/2021 96  78 - 100 fl Final     MCH 05/28/2021 30.3  26.5 - 33.0 pg Final     MCHC 05/28/2021 31.7  31.5 - 36.5 g/dL Final     RDW 05/28/2021 15.0  10.0 - 15.0 % Final     Platelet Count 05/28/2021 272  150 - 450 10e9/L Final     Diff Method 05/28/2021 Automated Method   Final     % Neutrophils 05/28/2021 83.5  % Final     % Lymphocytes 05/28/2021 8.6  % Final     % Monocytes 05/28/2021 5.4  % Final     % Eosinophils 05/28/2021 1.0  % Final     % Basophils 05/28/2021 0.8  % Final     % Immature Granulocytes 05/28/2021 0.7  % Final     Nucleated RBCs 05/28/2021 0  0 /100 Final     Absolute Neutrophil 05/28/2021 7.4  1.6 - 8.3 10e9/L Final     Absolute Lymphocytes 05/28/2021 0.8  0.8 - 5.3 10e9/L Final     Absolute Monocytes 05/28/2021 0.5  0.0 - 1.3 10e9/L Final     Absolute Eosinophils 05/28/2021 0.1  0.0 - 0.7 10e9/L Final     Absolute Basophils 05/28/2021 0.1  0.0 - 0.2 10e9/L Final     Abs Immature Granulocytes 05/28/2021 0.1  0 - 0.4 10e9/L Final     Absolute Nucleated RBC 05/28/2021 0.0   Final     Sodium 05/28/2021 138  133 - 144 mmol/L Final     Potassium 05/28/2021 3.8  3.4 - 5.3 mmol/L Final     Chloride 05/28/2021 107  94 - 109 mmol/L Final     Carbon Dioxide 05/28/2021 26  20 - 32 mmol/L Final     Anion Gap 05/28/2021 5  3 - 14 mmol/L Final     Glucose 05/28/2021 82  70 - 99 mg/dL Final     Urea Nitrogen 05/28/2021 6* 7 - 30 mg/dL Final     Creatinine 05/28/2021 0.60  0.52 - 1.04 mg/dL Final     GFR Estimate 05/28/2021 >90  >60 mL/min/[1.73_m2] Final    Comment: Non  GFR Calc  Starting 12/18/2018, serum creatinine based estimated GFR (eGFR) will be   calculated using the Chronic Kidney Disease Epidemiology  Collaboration   (CKD-EPI) equation.       GFR Estimate If Black 05/28/2021 >90  >60 mL/min/[1.73_m2] Final    Comment:  GFR Calc  Starting 12/18/2018, serum creatinine based estimated GFR (eGFR) will be   calculated using the Chronic Kidney Disease Epidemiology Collaboration   (CKD-EPI) equation.       Calcium 05/28/2021 8.9  8.5 - 10.1 mg/dL Final     Bilirubin Total 05/28/2021 0.5  0.2 - 1.3 mg/dL Final     Albumin 05/28/2021 3.6  3.4 - 5.0 g/dL Final     Protein Total 05/28/2021 7.3  6.8 - 8.8 g/dL Final     Alkaline Phosphatase 05/28/2021 112  40 - 150 U/L Final     ALT 05/28/2021 23  0 - 50 U/L Final     AST 05/28/2021 16  0 - 45 U/L Final     D Dimer 05/28/2021 0.8* 0.0 - 0.50 ug/ml FEU Final    Comment: This D-dimer assay is intended for use in conjunction with a clinical pretest   probability assessment model to exclude pulmonary embolism (PE) and deep   venous thrombosis (DVT) in outpatients suspected of PE or DVT. The cut-off   value is 0.5 ug/mL FEU.       Troponin I ES 05/28/2021 <0.015  0.000 - 0.045 ug/L Final    Comment: The 99th percentile for upper reference range is 0.045 ug/L.  Troponin values   in the range of 0.045 - 0.120 ug/L may be associated with risks of adverse   clinical events.                   Phone call duration: 17 minutes  Answers for HPI/ROS submitted by the patient on 8/24/2021  If you checked off any problems, how difficult have these problems made it for you to do your work, take care of things at home, or get along with other people?: Not difficult at all  PHQ9 TOTAL SCORE: 4  MONROE 7 TOTAL SCORE: 0

## 2021-08-26 NOTE — PATIENT INSTRUCTIONS
[facility]  [address] [place] [address]  Office: [phone]   Fax:    [phone]     Patient will cut her current 100 mg sertraline tablets in half and take 1/2 tablet equaling 50 mg daily for 5 days, then decrease to one 25 mg tablet of sertraline daily for 5 to 7 days, then stop.  Be off sertraline altogether for at least 2 days prior to starting venlafaxine ER 37.5 mg by mouth daily for 1 to 2 weeks.  If not at desired effect after 2 weeks may increase to 2 tablets taken together of 37.5 mg venlafaxine ER daily equaling 75 mg total.      Return in 8 days (on 9/3/2021) for preop for port removal with Dr. Mcdonnell . - appt scheduled with me for 2pm.     Patient will contact her oncologist's office at [facility] -for guidance on when to start her tamoxifen.  She will walk into our pharmacy for her third Covid vaccine booster of the Moderna shot within the next week or so as she is considered to be immunosuppressed actively undergoing cancer treatment.  We confirm that she was eligible with our pharmacist today.  She will come in between 9 AM and noon or 1:30pm  to 4:30 PM Monday through Friday for this.             Thank you so much for doing a telephone visit with us today. And, again, thank you  for choosing our [facility].  Please contact us with any questions that you may have.   We appreciate the opportunity to serve you now and look forward to supporting your healthcare needs for a long time to come!    Our clinic for the foreseeable future and until further notice , will continue to offer virtual visits, including telephone visits, video visits,  and e-visits, especially during this period of COVID-19 coronavirus pandemic.  Please call to schedule another one if you need it!        Most Sincerely,     Giselle Mcdonnell MD                                              To reach your [facility] care team after  hours call:   [phone]    PLEASE NOTE OUR HOURS HAVE CHANGED secondary to COVID-19 coronavirus pandemic, as we are trying to minimize patient exposure to the virus,  which is now widespread in the state.  These hours may change with very little notice.  We apologize for any inconvenience.       Our current clinic hours are:    Our current clinic hours are:         Monday- Thursday   7:00am - 6:00pm  in person.      Friday  7:00am- 5:00pm                       Saturday and Sunday : Closed to in person and virtual visits        We have telephone and virtual visit times available between    7:00am - 6pm on Monday-Friday as well.                                                Phone:  [phone]      Our pharmacy hours:Monday  through Friday 9:00am to 5:00pm                        Saturday - 9:00 am to 12 noon         Sunday : Closed.                ###  Please note: at this time we are not accepting any walk-in visits. ###      There is also information available at our web site:  [email or web address]    If your provider ordered any lab tests and you do not receive the results within 10 business days, please call the clinic.    If you need a medication refill please contact your pharmacy.  Please allow 2 business days for your refill to be completed.    Our clinic offers telephone visits and e visits.  Please ask one of your team members to explain more.      Use [email or web address] (secure email communication and access to your chart) to send your primary care provider a message or make an appointment. Ask someone on your Team how to sign up for Medication Review.     Pharmacy is drive-thru only at this time secondary to the COVID-19 coronavirus pandemic, as we are trying to minimize patient exposure to the virus,  which is now widespread in the state.        There is also information available at our web site:  [email or web address]    If your provider ordered any lab tests and you do not receive the results within 10 business days, please  call the clinic.    If you need a medication refill please contact your pharmacy.  Please allow 2 business days for your refill to be completed.

## 2021-08-27 ENCOUNTER — TELEPHONE (OUTPATIENT)
Dept: FAMILY MEDICINE | Facility: CLINIC | Age: 51
End: 2021-08-27

## 2021-08-27 DIAGNOSIS — F41.1 ANXIETY STATE: Primary | ICD-10-CM

## 2021-08-27 NOTE — TELEPHONE ENCOUNTER
Prior Authorization Retail Medication Request    Medication/Dose: Venlafaxine ER 37.5  ICD code (if different than what is on RX):    Previously Tried and Failed:    Rationale:      Insurance Name: [employer]     Insurance ID:  [id number]      Pharmacy Information (if different than what is on RX)  Name:  [facility]  Phone:  [phone]      Thank You,  Karly Moura, MeaghanT  [facility]

## 2021-08-31 NOTE — TELEPHONE ENCOUNTER
Insurance requesting more information - Can patient switch to formulary alternatives?  Please advise.      Available Formulary Alternatives: desvenlafaxine ext-rel, duloxetine, venlafaxine, venlafaxine ext-rel capsule

## 2021-08-31 NOTE — TELEPHONE ENCOUNTER
Central Prior Authorization Team   Phone: [phone]    PA Initiation    Medication: Venlafaxine ER 37.5  Insurance Company:    Pharmacy Filling the Rx: [facility] [place] [address] [place] [address]  Filling Pharmacy Phone: [phone]  Filling Pharmacy Fax: [phone]  Start Date: 8/31/2021

## 2021-09-02 ENCOUNTER — TELEPHONE (OUTPATIENT)
Dept: FAMILY MEDICINE | Facility: CLINIC | Age: 51
End: 2021-09-02

## 2021-09-02 DIAGNOSIS — F41.1 ANXIETY STATE: ICD-10-CM

## 2021-09-02 RX ORDER — VENLAFAXINE HYDROCHLORIDE 75 MG/1
75 TABLET, EXTENDED RELEASE ORAL DAILY
Qty: 90 TABLET | Refills: 0 | Status: CANCELLED | OUTPATIENT
Start: 2021-09-02

## 2021-09-02 RX ORDER — TAMOXIFEN CITRATE 20 MG/1
20 TABLET ORAL DAILY
COMMUNITY
Start: 2021-08-02

## 2021-09-02 RX ORDER — VENLAFAXINE HYDROCHLORIDE 75 MG/1
75 CAPSULE, EXTENDED RELEASE ORAL DAILY
Qty: 90 CAPSULE | Refills: 0 | Status: SHIPPED | OUTPATIENT
Start: 2021-09-02 | End: 2023-01-18 | Stop reason: ALTCHOICE

## 2021-09-02 RX ORDER — VENLAFAXINE HYDROCHLORIDE 37.5 MG/1
CAPSULE, EXTENDED RELEASE ORAL
Qty: 60 CAPSULE | Refills: 3 | Status: SHIPPED | OUTPATIENT
Start: 2021-09-02 | End: 2021-09-02

## 2021-09-02 RX ORDER — VENLAFAXINE HYDROCHLORIDE 37.5 MG/1
CAPSULE, EXTENDED RELEASE ORAL
Qty: 14 CAPSULE | Refills: 0 | Status: SHIPPED | OUTPATIENT
Start: 2021-09-02

## 2021-09-02 NOTE — TELEPHONE ENCOUNTER
Agree with Dr. Arroyo, venlafaxine is the medication prescribed (is generic and per note below, preferred by insurance), what seems to be the issue?      Arlene Matamoros MBA, MS, PA-C

## 2021-09-02 NOTE — TELEPHONE ENCOUNTER
Insurance limits 1 cap/day for Venlafaxine so we need 1 rx for 37.5 mg for first 1-2 weeks and 1 rx for 75 mg going forward       Prescriptions pended. Routing to provider to review and advise.     Deirdre Covarrubias RN  [facility]

## 2021-09-03 ENCOUNTER — OFFICE VISIT (OUTPATIENT)
Dept: FAMILY MEDICINE | Facility: CLINIC | Age: 51
End: 2021-09-03
Payer: COMMERCIAL

## 2021-09-03 VITALS
HEIGHT: 64 IN | TEMPERATURE: 97.2 F | RESPIRATION RATE: 16 BRPM | BODY MASS INDEX: 17.93 KG/M2 | DIASTOLIC BLOOD PRESSURE: 64 MMHG | HEART RATE: 83 BPM | OXYGEN SATURATION: 97 % | WEIGHT: 105 LBS | SYSTOLIC BLOOD PRESSURE: 98 MMHG

## 2021-09-03 DIAGNOSIS — C50.911 INVASIVE DUCTAL CARCINOMA OF BREAST, RIGHT (H): ICD-10-CM

## 2021-09-03 DIAGNOSIS — Z01.818 PREOP GENERAL PHYSICAL EXAM: Primary | ICD-10-CM

## 2021-09-03 DIAGNOSIS — Z11.59 ENCOUNTER FOR SCREENING FOR OTHER VIRAL DISEASES: ICD-10-CM

## 2021-09-03 LAB
ERYTHROCYTE [DISTWIDTH] IN BLOOD BY AUTOMATED COUNT: 11.8 % (ref 10–15)
HCT VFR BLD AUTO: 41 % (ref 35–47)
HGB BLD-MCNC: 14.1 G/DL (ref 11.7–15.7)
MCH RBC QN AUTO: 29.3 PG (ref 26.5–33)
MCHC RBC AUTO-ENTMCNC: 34.4 G/DL (ref 31.5–36.5)
MCV RBC AUTO: 85 FL (ref 78–100)
PLATELET # BLD AUTO: 199 10E3/UL (ref 150–450)
RBC # BLD AUTO: 4.82 10E6/UL (ref 3.8–5.2)
WBC # BLD AUTO: 4.3 10E3/UL (ref 4–11)

## 2021-09-03 PROCEDURE — U0005 INFEC AGEN DETEC AMPLI PROBE: HCPCS | Performed by: NURSE PRACTITIONER

## 2021-09-03 PROCEDURE — U0003 INFECTIOUS AGENT DETECTION BY NUCLEIC ACID (DNA OR RNA); SEVERE ACUTE RESPIRATORY SYNDROME CORONAVIRUS 2 (SARS-COV-2) (CORONAVIRUS DISEASE [COVID-19]), AMPLIFIED PROBE TECHNIQUE, MAKING USE OF HIGH THROUGHPUT TECHNOLOGIES AS DESCRIBED BY CMS-2020-01-R: HCPCS | Performed by: NURSE PRACTITIONER

## 2021-09-03 PROCEDURE — 36415 COLL VENOUS BLD VENIPUNCTURE: CPT | Performed by: NURSE PRACTITIONER

## 2021-09-03 PROCEDURE — 99214 OFFICE O/P EST MOD 30 MIN: CPT | Performed by: NURSE PRACTITIONER

## 2021-09-03 PROCEDURE — 85027 COMPLETE CBC AUTOMATED: CPT | Performed by: NURSE PRACTITIONER

## 2021-09-03 ASSESSMENT — ASTHMA QUESTIONNAIRES
QUESTION_5 LAST FOUR WEEKS HOW WOULD YOU RATE YOUR ASTHMA CONTROL: WELL CONTROLLED
QUESTION_4 LAST FOUR WEEKS HOW OFTEN HAVE YOU USED YOUR RESCUE INHALER OR NEBULIZER MEDICATION (SUCH AS ALBUTEROL): ONCE A WEEK OR LESS
QUESTION_3 LAST FOUR WEEKS HOW OFTEN DID YOUR ASTHMA SYMPTOMS (WHEEZING, COUGHING, SHORTNESS OF BREATH, CHEST TIGHTNESS OR PAIN) WAKE YOU UP AT NIGHT OR EARLIER THAN USUAL IN THE MORNING: FOUR OR MORE NIGHTS A WEEK
ACT_TOTALSCORE: 15
QUESTION_1 LAST FOUR WEEKS HOW MUCH OF THE TIME DID YOUR ASTHMA KEEP YOU FROM GETTING AS MUCH DONE AT WORK, SCHOOL OR AT HOME: A LITTLE OF THE TIME
QUESTION_2 LAST FOUR WEEKS HOW OFTEN HAVE YOU HAD SHORTNESS OF BREATH: ONCE A DAY

## 2021-09-03 ASSESSMENT — MIFFLIN-ST. JEOR: SCORE: 1076.28

## 2021-09-03 NOTE — RESULT ENCOUNTER NOTE
Dear Viet,     -Normal red blood cell (hgb) levels, normal white blood cell count and normal platelet levels.      Please send a [employer] message or call [phone]  if you have any questions.      AILCIA Alcala, CNP  [facility]    If you have further questions about the interpretation of your labs, [email or web address] is a good website to check out for further information.

## 2021-09-03 NOTE — PATIENT INSTRUCTIONS
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
## 2021-09-04 LAB — SARS-COV-2 RNA RESP QL NAA+PROBE: NEGATIVE

## 2021-09-04 ASSESSMENT — ASTHMA QUESTIONNAIRES: ACT_TOTALSCORE: 15

## 2021-09-07 ENCOUNTER — APPOINTMENT (OUTPATIENT)
Dept: SURGERY | Facility: PHYSICIAN GROUP | Age: 51
End: 2021-09-07
Payer: COMMERCIAL

## 2021-09-07 ENCOUNTER — ANESTHESIA (OUTPATIENT)
Dept: SURGERY | Facility: CLINIC | Age: 51
End: 2021-09-07
Payer: COMMERCIAL

## 2021-09-07 ENCOUNTER — HOSPITAL ENCOUNTER (OUTPATIENT)
Facility: CLINIC | Age: 51
Discharge: HOME | End: 2021-09-07
Attending: SURGERY | Admitting: SURGERY
Payer: COMMERCIAL

## 2021-09-07 ENCOUNTER — ANESTHESIA EVENT (OUTPATIENT)
Dept: SURGERY | Facility: CLINIC | Age: 51
End: 2021-09-07
Payer: COMMERCIAL

## 2021-09-07 VITALS
TEMPERATURE: 97.7 F | SYSTOLIC BLOOD PRESSURE: 90 MMHG | WEIGHT: 105 LBS | DIASTOLIC BLOOD PRESSURE: 54 MMHG | HEIGHT: 64 IN | BODY MASS INDEX: 17.93 KG/M2 | RESPIRATION RATE: 12 BRPM | OXYGEN SATURATION: 96 % | HEART RATE: 80 BPM

## 2021-09-07 DIAGNOSIS — C50.919 BREAST CANCER (H): ICD-10-CM

## 2021-09-07 DIAGNOSIS — C50.911 INVASIVE DUCTAL CARCINOMA OF BREAST, RIGHT (H): Primary | ICD-10-CM

## 2021-09-07 PROCEDURE — 250N000009 HC RX 250: Performed by: SURGERY

## 2021-09-07 PROCEDURE — 710N000012 HC RECOVERY PHASE 2, PER MINUTE: Performed by: SURGERY

## 2021-09-07 PROCEDURE — 250N000009 HC RX 250: Performed by: NURSE ANESTHETIST, CERTIFIED REGISTERED

## 2021-09-07 PROCEDURE — 360N000075 HC SURGERY LEVEL 2, PER MIN: Performed by: SURGERY

## 2021-09-07 PROCEDURE — 272N000001 HC OR GENERAL SUPPLY STERILE: Performed by: SURGERY

## 2021-09-07 PROCEDURE — 36590 REMOVAL TUNNELED CV CATH: CPT | Performed by: SURGERY

## 2021-09-07 PROCEDURE — 258N000003 HC RX IP 258 OP 636: Performed by: NURSE ANESTHETIST, CERTIFIED REGISTERED

## 2021-09-07 PROCEDURE — 250N000011 HC RX IP 250 OP 636: Performed by: SURGERY

## 2021-09-07 PROCEDURE — 999N000141 HC STATISTIC PRE-PROCEDURE NURSING ASSESSMENT: Performed by: SURGERY

## 2021-09-07 PROCEDURE — 250N000011 HC RX IP 250 OP 636: Performed by: NURSE ANESTHETIST, CERTIFIED REGISTERED

## 2021-09-07 PROCEDURE — 370N000017 HC ANESTHESIA TECHNICAL FEE, PER MIN: Performed by: SURGERY

## 2021-09-07 RX ORDER — ACETAMINOPHEN 500 MG
1000 TABLET ORAL EVERY 6 HOURS PRN
COMMUNITY
Start: 2021-09-07 | End: 2023-01-18

## 2021-09-07 RX ORDER — ONDANSETRON 4 MG/1
4 TABLET, ORALLY DISINTEGRATING ORAL EVERY 30 MIN PRN
Status: DISCONTINUED | OUTPATIENT
Start: 2021-09-07 | End: 2021-09-07 | Stop reason: HOSPADM

## 2021-09-07 RX ORDER — HYDROMORPHONE HYDROCHLORIDE 1 MG/ML
0.5 INJECTION, SOLUTION INTRAMUSCULAR; INTRAVENOUS; SUBCUTANEOUS
Status: DISCONTINUED | OUTPATIENT
Start: 2021-09-07 | End: 2021-09-07 | Stop reason: HOSPADM

## 2021-09-07 RX ORDER — SODIUM CHLORIDE, SODIUM LACTATE, POTASSIUM CHLORIDE, CALCIUM CHLORIDE 600; 310; 30; 20 MG/100ML; MG/100ML; MG/100ML; MG/100ML
INJECTION, SOLUTION INTRAVENOUS CONTINUOUS PRN
Status: DISCONTINUED | OUTPATIENT
Start: 2021-09-07 | End: 2021-09-07

## 2021-09-07 RX ORDER — FENTANYL CITRATE 50 UG/ML
INJECTION, SOLUTION INTRAMUSCULAR; INTRAVENOUS PRN
Status: DISCONTINUED | OUTPATIENT
Start: 2021-09-07 | End: 2021-09-07

## 2021-09-07 RX ORDER — ALBUTEROL SULFATE 0.83 MG/ML
2.5 SOLUTION RESPIRATORY (INHALATION) EVERY 4 HOURS PRN
Status: DISCONTINUED | OUTPATIENT
Start: 2021-09-07 | End: 2021-09-07 | Stop reason: HOSPADM

## 2021-09-07 RX ORDER — NALOXONE HYDROCHLORIDE 0.4 MG/ML
0.2 INJECTION, SOLUTION INTRAMUSCULAR; INTRAVENOUS; SUBCUTANEOUS
Status: DISCONTINUED | OUTPATIENT
Start: 2021-09-07 | End: 2021-09-07 | Stop reason: HOSPADM

## 2021-09-07 RX ORDER — LABETALOL HYDROCHLORIDE 5 MG/ML
10 INJECTION, SOLUTION INTRAVENOUS
Status: DISCONTINUED | OUTPATIENT
Start: 2021-09-07 | End: 2021-09-07 | Stop reason: HOSPADM

## 2021-09-07 RX ORDER — FENTANYL CITRATE 50 UG/ML
50 INJECTION, SOLUTION INTRAMUSCULAR; INTRAVENOUS
Status: DISCONTINUED | OUTPATIENT
Start: 2021-09-07 | End: 2021-09-07 | Stop reason: HOSPADM

## 2021-09-07 RX ORDER — SODIUM CHLORIDE, SODIUM LACTATE, POTASSIUM CHLORIDE, CALCIUM CHLORIDE 600; 310; 30; 20 MG/100ML; MG/100ML; MG/100ML; MG/100ML
INJECTION, SOLUTION INTRAVENOUS CONTINUOUS
Status: DISCONTINUED | OUTPATIENT
Start: 2021-09-07 | End: 2021-09-07 | Stop reason: HOSPADM

## 2021-09-07 RX ORDER — NALOXONE HYDROCHLORIDE 0.4 MG/ML
0.4 INJECTION, SOLUTION INTRAMUSCULAR; INTRAVENOUS; SUBCUTANEOUS
Status: DISCONTINUED | OUTPATIENT
Start: 2021-09-07 | End: 2021-09-07 | Stop reason: HOSPADM

## 2021-09-07 RX ORDER — OXYCODONE HYDROCHLORIDE 5 MG/1
5 TABLET ORAL EVERY 4 HOURS PRN
Status: DISCONTINUED | OUTPATIENT
Start: 2021-09-07 | End: 2021-09-07 | Stop reason: HOSPADM

## 2021-09-07 RX ORDER — PROPOFOL 10 MG/ML
INJECTION, EMULSION INTRAVENOUS CONTINUOUS PRN
Status: DISCONTINUED | OUTPATIENT
Start: 2021-09-07 | End: 2021-09-07

## 2021-09-07 RX ORDER — CEFAZOLIN SODIUM/WATER 2 G/20 ML
2 SYRINGE (ML) INTRAVENOUS SEE ADMIN INSTRUCTIONS
Status: DISCONTINUED | OUTPATIENT
Start: 2021-09-07 | End: 2021-09-07 | Stop reason: HOSPADM

## 2021-09-07 RX ORDER — LORAZEPAM 2 MG/ML
.5-1 INJECTION INTRAMUSCULAR
Status: DISCONTINUED | OUTPATIENT
Start: 2021-09-07 | End: 2021-09-07 | Stop reason: HOSPADM

## 2021-09-07 RX ORDER — ACETAMINOPHEN 325 MG/1
650 TABLET ORAL
Status: DISCONTINUED | OUTPATIENT
Start: 2021-09-07 | End: 2021-09-07 | Stop reason: HOSPADM

## 2021-09-07 RX ORDER — CEFAZOLIN SODIUM 2 G/100ML
2 INJECTION, SOLUTION INTRAVENOUS
Status: COMPLETED | OUTPATIENT
Start: 2021-09-07 | End: 2021-09-07

## 2021-09-07 RX ORDER — HYDRALAZINE HYDROCHLORIDE 20 MG/ML
2.5-5 INJECTION INTRAMUSCULAR; INTRAVENOUS EVERY 10 MIN PRN
Status: DISCONTINUED | OUTPATIENT
Start: 2021-09-07 | End: 2021-09-07 | Stop reason: HOSPADM

## 2021-09-07 RX ORDER — ONDANSETRON 2 MG/ML
INJECTION INTRAMUSCULAR; INTRAVENOUS PRN
Status: DISCONTINUED | OUTPATIENT
Start: 2021-09-07 | End: 2021-09-07

## 2021-09-07 RX ORDER — KETOROLAC TROMETHAMINE 30 MG/ML
15 INJECTION, SOLUTION INTRAMUSCULAR; INTRAVENOUS EVERY 6 HOURS PRN
Status: DISCONTINUED | OUTPATIENT
Start: 2021-09-07 | End: 2021-09-07 | Stop reason: HOSPADM

## 2021-09-07 RX ORDER — OXYCODONE HYDROCHLORIDE 5 MG/1
5-10 TABLET ORAL EVERY 4 HOURS PRN
Qty: 6 TABLET | Refills: 0 | Status: SHIPPED | OUTPATIENT
Start: 2021-09-07 | End: 2022-10-05

## 2021-09-07 RX ORDER — MEPERIDINE HYDROCHLORIDE 25 MG/ML
12.5 INJECTION INTRAMUSCULAR; INTRAVENOUS; SUBCUTANEOUS
Status: DISCONTINUED | OUTPATIENT
Start: 2021-09-07 | End: 2021-09-07 | Stop reason: HOSPADM

## 2021-09-07 RX ORDER — IBUPROFEN 200 MG
600 TABLET ORAL EVERY 6 HOURS PRN
COMMUNITY
Start: 2021-09-07

## 2021-09-07 RX ORDER — OXYCODONE HYDROCHLORIDE 5 MG/1
5 TABLET ORAL
Status: DISCONTINUED | OUTPATIENT
Start: 2021-09-07 | End: 2021-09-07

## 2021-09-07 RX ORDER — ONDANSETRON 2 MG/ML
4 INJECTION INTRAMUSCULAR; INTRAVENOUS EVERY 30 MIN PRN
Status: DISCONTINUED | OUTPATIENT
Start: 2021-09-07 | End: 2021-09-07 | Stop reason: HOSPADM

## 2021-09-07 RX ORDER — FENTANYL CITRATE 50 UG/ML
25-50 INJECTION, SOLUTION INTRAMUSCULAR; INTRAVENOUS EVERY 5 MIN PRN
Status: DISCONTINUED | OUTPATIENT
Start: 2021-09-07 | End: 2021-09-07 | Stop reason: HOSPADM

## 2021-09-07 RX ADMIN — ONDANSETRON HYDROCHLORIDE 4 MG: 2 INJECTION, SOLUTION INTRAVENOUS at 11:43

## 2021-09-07 RX ADMIN — PROPOFOL 75 MCG/KG/MIN: 10 INJECTION, EMULSION INTRAVENOUS at 11:19

## 2021-09-07 RX ADMIN — MIDAZOLAM 2 MG: 1 INJECTION INTRAMUSCULAR; INTRAVENOUS at 11:14

## 2021-09-07 RX ADMIN — CEFAZOLIN SODIUM 2 G: 2 INJECTION, SOLUTION INTRAVENOUS at 11:14

## 2021-09-07 RX ADMIN — FENTANYL CITRATE 25 MCG: 50 INJECTION, SOLUTION INTRAMUSCULAR; INTRAVENOUS at 11:30

## 2021-09-07 RX ADMIN — FENTANYL CITRATE 25 MCG: 50 INJECTION, SOLUTION INTRAMUSCULAR; INTRAVENOUS at 11:28

## 2021-09-07 RX ADMIN — SODIUM CHLORIDE, POTASSIUM CHLORIDE, SODIUM LACTATE AND CALCIUM CHLORIDE: 600; 310; 30; 20 INJECTION, SOLUTION INTRAVENOUS at 11:14

## 2021-09-07 ASSESSMENT — MIFFLIN-ST. JEOR: SCORE: 1076.28

## 2021-09-07 NOTE — DISCHARGE INSTRUCTIONS
GENERAL ANESTHESIA OR SEDATION ADULT DISCHARGE INSTRUCTIONS   SPECIAL PRECAUTIONS FOR 24 HOURS AFTER SURGERY    IT IS NOT UNUSUAL TO FEEL LIGHT-HEADED OR FAINT, UP TO 24 HOURS AFTER SURGERY OR WHILE TAKING PAIN MEDICATION.  IF YOU HAVE THESE SYMPTOMS; SIT FOR A FEW MINUTES BEFORE STANDING AND HAVE SOMEONE ASSIST YOU WHEN YOU GET UP TO WALK OR USE THE BATHROOM.    YOU SHOULD REST AND RELAX FOR THE NEXT 24 HOURS AND YOU MUST MAKE ARRANGEMENTS TO HAVE SOMEONE STAY WITH YOU FOR AT LEAST 24 HOURS AFTER YOUR DISCHARGE.  AVOID HAZARDOUS AND STRENUOUS ACTIVITIES.  DO NOT MAKE IMPORTANT DECISIONS FOR 24 HOURS.    DO NOT DRIVE ANY VEHICLE OR OPERATE MECHANICAL EQUIPMENT FOR 24 HOURS FOLLOWING THE END OF YOUR SURGERY.  EVEN THOUGH YOU MAY FEEL NORMAL, YOUR REACTIONS MAY BE AFFECTED BY THE MEDICATION YOU HAVE RECEIVED.    DO NOT DRINK ALCOHOLIC BEVERAGES FOR 24 HOURS FOLLOWING YOUR SURGERY.    DRINK CLEAR LIQUIDS (APPLE JUICE, GINGER ALE, 7-UP, BROTH, ETC.).  PROGRESS TO YOUR REGULAR DIET AS YOU FEEL ABLE.    YOU MAY HAVE A DRY MOUTH, A SORE THROAT, MUSCLES ACHES OR TROUBLE SLEEPING.  THESE SHOULD GO AWAY AFTER 24 HOURS.    CALL YOUR DOCTOR FOR ANY OF THE FOLLOWING:  SIGNS OF INFECTION (FEVER, GROWING TENDERNESS AT THE SURGERY SITE, A LARGE AMOUNT OF DRAINAGE OR BLEEDING, SEVERE PAIN, FOUL-SMELLING DRAINAGE, REDNESS OR SWELLING.    IT HAS BEEN OVER 8 TO 10 HOURS SINCE SURGERY AND YOU ARE STILL NOT ABLE TO URINATE (PASS WATER).     HOME CARE FOLLOWING MINOR SURGERY        DRESSING  Keep dressing dry and in place until your doctor instructs you to remove the dressing.    DRAINAGE  There should be minimal drainage. If bleeding should occur and soaks through the dressing apply a sterile, dry dressing over it and tape in place. If bleeding persists, apply gentle, steady pressure with your hand over the dressing for 5 minutes. If the bleeding does not stop, call your doctor.    SKIN CLOSURE  You may have stitches or special skin  closures. You will be given an appointment for the removal of any external stitches. You may have stitches under the skin which will absorb. You may have steri-strips over the incision. These look like thin tapes and will peel off in 5-7 days. If they don t after 7 days, you may carefully remove them. You may shower with the steri-strips but do not soak them, as with swimming or taking a bath. A protective covering of plastic may be placed over external stitches for showering.    NOTIFY YOUR PHYSICIAN IF YOU HAVE ANY OF THE FOLLOWING SYMPTOMS:    1. Fever greater than 101 degrees  2. Excessive bleeding or drainage  3. Disruption of the skin closure  4. Swelling, redness or excessive tenderness at the site  5. Severe pain  6. Drainage that is green, yellow, thick white or has a bad odor

## 2021-09-07 NOTE — OP NOTE
General Surgery Operative Note     Pre-operative diagnosis: Breast cancer (H) [C50.919]   Post-operative diagnosis: Same    Procedure: Left subclavian Power Port removal   Surgeon: Colleen Reynoso MD   Assistant(s): {None   Anesthesia: Local with MAC    Estimated blood loss:   2 ml           INDICATION FOR OPERATION: Patient is a 51 year old female with history of breast cancer who has completed chemotherapy and desires port-a-cath removal. She has signed informed consent after discussing risks and benefits of the procedure.    ANESTHESIA: MAC with local anesthetic    DESCRIPTION OF PROCEDURE:    The patient was placed supine on the operating table. IV anesthesia was induced. After  reaffirmation  of  the  patient's  identity,  the  procedure  to  be  performed,  and  the  site  of  the  Procedure, her previous incision was opened sharply. Dissection was carried down to the fibrous capsule around the port using electrocautery. The prolene sutures to the right and left of the port were identified and removed. The port was removed intact and all portions were accounted for.  The tract for the catheter was closed with a 3-0 vicryl in a figure of eight. The capsule was scored with electrocautery. The  pocket  incision  was  closed  with  3-0  Vicryl  subdermal  stitches  and  a  running  4-0  vicryl  subcuticular  stitch.  The  skin  was  cleaned  and  dried,  and  the  incision  was  dressed  with  dermabond.    The  patient  was  then awoken  and  transferred  to  the  Post  Anesthesia  Care  Unit  in  stable  condition.  There were no complications. Sponge  and  needle  counts  were  correct.      Colleen Reynoso MD

## 2021-09-07 NOTE — ANESTHESIA CARE TRANSFER NOTE
Patient: Jono Templeton    Procedure(s):  REMOVAL, VASCULAR ACCESS PORT    Diagnosis: Breast cancer (H) [C50.919]  Diagnosis Additional Information: No value filed.    Anesthesia Type:   MAC     Note:    Oropharynx: oropharynx clear of all foreign objects  Level of Consciousness: awake and drowsy  Oxygen Supplementation: room air    Independent Airway: airway patency satisfactory and stable  Dentition: dentition unchanged  Vital Signs Stable: post-procedure vital signs reviewed and stable  Report to RN Given: handoff report given  Patient transferred to: Phase II  Comments: Patient awake/alert. To Phase2 Recovery on RA ventilating well. VSS. Report given.  Handoff Report: Identifed the Patient, Identified the Reponsible Provider, Reviewed the pertinent medical history, Discussed the surgical course, Reviewed Intra-OP anesthesia mangement and issues during anesthesia, Set expectations for post-procedure period and Allowed opportunity for questions and acknowledgement of understanding      Vitals:  Vitals Value Taken Time   BP     Temp     Pulse     Resp     SpO2         Electronically Signed By: ALICIA Alfaro CRNA  September 7, 2021  11:53 AM

## 2021-09-07 NOTE — ANESTHESIA POSTPROCEDURE EVALUATION
Patient: Jono Templeton    Procedure(s):  REMOVAL, VASCULAR ACCESS PORT    Diagnosis:Breast cancer (H) [C50.919]  Diagnosis Additional Information: No value filed.    Anesthesia Type:  MAC    Note:  Disposition: Outpatient   Postop Pain Control: Uneventful            Sign Out: Well controlled pain   PONV: No   Neuro/Psych: Uneventful            Sign Out: Acceptable/Baseline neuro status   Airway/Respiratory: Uneventful            Sign Out: Acceptable/Baseline resp. status   CV/Hemodynamics: Uneventful            Sign Out: Acceptable CV status   Other NRE: NONE   DID A NON-ROUTINE EVENT OCCUR? No           Last vitals:  Vitals Value Taken Time   BP 90/54 09/07/21 1301   Temp 97.7  F (36.5  C) 09/07/21 1301   Pulse     Resp 12 09/07/21 1301   SpO2 96 % 09/07/21 1301       Electronically Signed By: Reed Barone MD  September 7, 2021  1:58 PM
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
## 2021-09-18 ENCOUNTER — HEALTH MAINTENANCE LETTER (OUTPATIENT)
Age: 51
End: 2021-09-18

## 2021-09-29 ENCOUNTER — ANCILLARY PROCEDURE (OUTPATIENT)
Dept: BONE DENSITY | Facility: CLINIC | Age: 51
End: 2021-09-29
Attending: FAMILY MEDICINE
Payer: COMMERCIAL

## 2021-09-29 DIAGNOSIS — C50.911 MALIGNANT NEOPLASM OF RIGHT FEMALE BREAST, UNSPECIFIED ESTROGEN RECEPTOR STATUS, UNSPECIFIED SITE OF BREAST (H): ICD-10-CM

## 2021-09-29 PROCEDURE — 77080 DXA BONE DENSITY AXIAL: CPT | Performed by: INTERNAL MEDICINE

## 2021-10-20 ENCOUNTER — HOSPITAL ENCOUNTER (OUTPATIENT)
Dept: MRI IMAGING | Facility: CLINIC | Age: 51
Discharge: HOME | End: 2021-10-20
Attending: NURSE PRACTITIONER | Admitting: NURSE PRACTITIONER
Payer: COMMERCIAL

## 2021-10-20 DIAGNOSIS — C50.811 MALIGNANT NEOPLASM OF OVERLAPPING SITES OF RIGHT FEMALE BREAST, UNSPECIFIED ESTROGEN RECEPTOR STATUS (H): ICD-10-CM

## 2021-10-20 PROCEDURE — A9585 GADOBUTROL INJECTION: HCPCS | Performed by: RADIOLOGY

## 2021-10-20 PROCEDURE — 255N000002 HC RX 255 OP 636: Performed by: RADIOLOGY

## 2021-10-20 PROCEDURE — 72157 MRI CHEST SPINE W/O & W/DYE: CPT

## 2021-10-20 RX ORDER — GADOBUTROL 604.72 MG/ML
7.5 INJECTION INTRAVENOUS ONCE
Status: COMPLETED | OUTPATIENT
Start: 2021-10-20 | End: 2021-10-20

## 2021-10-20 RX ADMIN — GADOBUTROL 7.5 ML: 604.72 INJECTION INTRAVENOUS at 14:20

## 2021-10-21 ENCOUNTER — MYC MEDICAL ADVICE (OUTPATIENT)
Dept: FAMILY MEDICINE | Facility: CLINIC | Age: 51
End: 2021-10-21

## 2021-10-21 DIAGNOSIS — S22.080A T12 COMPRESSION FRACTURE, INITIAL ENCOUNTER (H): Primary | ICD-10-CM

## 2021-10-27 ENCOUNTER — MYC MEDICAL ADVICE (OUTPATIENT)
Dept: FAMILY MEDICINE | Facility: CLINIC | Age: 51
End: 2021-10-27
Payer: COMMERCIAL

## 2021-10-28 NOTE — TELEPHONE ENCOUNTER
Please review and advise on my chart message below      Thank you     Jesika GIFFORD RN, BSN  [facility]        FINDINGS: Thoracic spine alignment is grossly within normal limits.  Subtle superior endplate deformity of T10 with minimal loss of  vertebral body height and small associated Schmorl's node is unchanged  since prior CT 5/28/2021. Mild deformity at the inferior T12 endplate  with slight anterior wedging and loss of approximately 10% vertebral  body height is new since 5/28/2021. No STIR hyperintense edema in the  T12 vertebral body which suggests that this is more subacute in age.  No other significant loss of vertebral body height. T2 and T1  hyperintense lesions within the T4 vertebral body and a small lesion  in T1 are most compatible with venous vascular malformations  (previously turned hemangiomas). No definite suspicious bony lesions.     No significant degenerative endplate changes or loss of disc height in  the thoracic spine. No significant disc herniation. Normal facets. No  spinal canal or neural foraminal narrowing.     No abnormal signal within the visualized spinal cord. No abnormal  intramedullary or leptomeningeal enhancement of the spinal cord.     Degenerative changes poorly evaluated in the lower cervical spine,  particularly at C6-C7.     Paraspinous soft tissues are unremarkable.                                                                      IMPRESSION:    1. Mild inferior endplate deformity with slight anterior wedging of  T12. This is new since CT 5/28/2021. No associated edema within this  vertebral body would suggest this endplate fracture may be more  subacute to chronic in age.  2. Slight superior endplate deformity of T10 with minimal loss of  vertebral body height is unchanged since 5/28/2021.  3. No significant degenerative changes in the thoracic spine. No  spinal canal or neural foraminal narrowing at any level.  4. No destructive bony lesions  "appreciated in the cervical spine.  5. Degenerative changes poorly evaluated in the lower cervical spine.        GINNY ROSSI MD       FINDINGS:               Lumbar Spine (L1-L4)      T-score:  -2.3               Left Femoral Neck            T-score:  -1.9               Right Femoral Neck          T-score:  -2.0                  Lumbar (L1-L4) BMD: 0.906                             Total Hip Mean BMD: 0.801       IMPRESSION  Osteopenia.     Patient had a study performed previously, however the scans are not available to compare to the current study.      Recommendations include ensuring adequate Calcium and Vitamin D.     The current NOF Guidelines recommend treatment for patients with prior hip or vertebral fracture, T-score -2.5 or below, or 10 year risk of any major osteoporotic fracture >20% or 10 year risk of hip fracture >3%, as calculated using the FRAX calculator (www.shef.ac.uk/FRAX or you can google \"FRAX\").       This patient's risks based on available information, with the use of FRAX, are 2.6 % for major osteoporotic fracture and 0.4 % for hip fracture.   Based on these guidelines, treatment (in addition to calcium and vitamin D) is not recommended for this patient, after ruling out other causes of osteoporosis.  This is meant as an aid to clinical decision making; one must still use clinical judgement.        Follow up can be considered in 2 years.   ___________________  Elizabeth Blount M.D.  "

## 2021-10-31 PROBLEM — S22.080A T12 COMPRESSION FRACTURE, INITIAL ENCOUNTER (H): Status: ACTIVE | Noted: 2021-10-31

## 2021-11-01 NOTE — TELEPHONE ENCOUNTER
Reviewed pt's MRI of thoracic spine that looks like oncology ordered for her - done on 10/20/2021:   1. Mild inferior endplate deformity with slight anterior wedging of  T12. This is new since CT 5/28/2021. No associated edema within this  vertebral body would suggest this endplate fracture may be more  subacute to chronic in age.  2. Slight superior endplate deformity of T10 with minimal loss of  vertebral body height is unchanged since 5/28/2021.  3. No significant degenerative changes in the thoracic spine. No  spinal canal or neural foraminal narrowing at any level.  4. No destructive bony lesions appreciated in the cervical spine.  5. Degenerative changes poorly evaluated in the lower cervical spine.    Pt's back discomfort could be coming from slight wedge compression fracture at T12.  Recommend that pt see spine specialist.  Put this is mychart message.     .see that.

## 2021-11-03 ENCOUNTER — OFFICE VISIT (OUTPATIENT)
Dept: NEUROSURGERY | Facility: CLINIC | Age: 51
End: 2021-11-03
Attending: FAMILY MEDICINE
Payer: COMMERCIAL

## 2021-11-03 VITALS
SYSTOLIC BLOOD PRESSURE: 116 MMHG | DIASTOLIC BLOOD PRESSURE: 57 MMHG | OXYGEN SATURATION: 98 % | HEIGHT: 64 IN | HEART RATE: 75 BPM | BODY MASS INDEX: 18.3 KG/M2 | WEIGHT: 107.2 LBS | RESPIRATION RATE: 16 BRPM

## 2021-11-03 DIAGNOSIS — S22.080A T12 COMPRESSION FRACTURE, INITIAL ENCOUNTER (H): ICD-10-CM

## 2021-11-03 PROCEDURE — G0463 HOSPITAL OUTPT CLINIC VISIT: HCPCS

## 2021-11-03 PROCEDURE — 99203 OFFICE O/P NEW LOW 30 MIN: CPT | Performed by: PHYSICIAN ASSISTANT

## 2021-11-03 ASSESSMENT — PAIN SCALES - GENERAL: PAINLEVEL: MILD PAIN (2)

## 2021-11-03 ASSESSMENT — MIFFLIN-ST. JEOR: SCORE: 1086.26
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
## 2021-11-30 NOTE — TELEPHONE ENCOUNTER
RECORDS RECEIVED FROM: Internal   DATE RECEIVED: 12.6.21   NOTES (FOR ALL VISITS) STATUS DETAILS   OFFICE NOTES from referring provider Internal 11.3.21 MADALYN Matias Neurosurg   OFFICE NOTES from other specialist N/A    ED NOTES N/A    OPERATIVE REPORT  (thyroid, pituitary, adrenal, parathyroid) N/A    MEDICATION LIST Internal    IMAGING      DEXASCAN N/A    MRI (BRAIN) N/A    XR (Chest) Internal 3.9.21 chest   CT (HEAD/NECK/CHEST/ABDOMEN) Internal 5.28.21 chest   NUCLEAR  N/A    ULTRASOUND (HEAD/NECK) N/A    LABS     DIABETES: HBGA1C, CREATININE, FASTING LIPIDS, MICROALBUMIN URINE, POTASSIUM, TSH, T4    THYROID: TSH, T4, CBC, THYRODLONULIN, TOTAL T3, FREE T4, CALCITONIN, CEA Internal

## 2021-12-06 ENCOUNTER — VIRTUAL VISIT (OUTPATIENT)
Dept: ENDOCRINOLOGY | Facility: CLINIC | Age: 51
End: 2021-12-06
Attending: PHYSICIAN ASSISTANT
Payer: COMMERCIAL

## 2021-12-06 ENCOUNTER — PRE VISIT (OUTPATIENT)
Dept: ENDOCRINOLOGY | Facility: CLINIC | Age: 51
End: 2021-12-06

## 2021-12-06 DIAGNOSIS — S22.080A T12 COMPRESSION FRACTURE, INITIAL ENCOUNTER (H): ICD-10-CM

## 2021-12-06 DIAGNOSIS — M80.00XS OSTEOPOROSIS WITH CURRENT PATHOLOGICAL FRACTURE, UNSPECIFIED OSTEOPOROSIS TYPE, SEQUELA: Primary | ICD-10-CM

## 2021-12-06 PROCEDURE — 99205 OFFICE O/P NEW HI 60 MIN: CPT | Mod: 95 | Performed by: INTERNAL MEDICINE

## 2021-12-06 RX ORDER — ALENDRONATE SODIUM 70 MG/1
70 TABLET ORAL
Qty: 12 TABLET | Refills: 3 | Status: SHIPPED | OUTPATIENT
Start: 2021-12-06 | End: 2022-10-24
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
## 2021-12-06 NOTE — PROGRESS NOTES
Viet is a 51 year old who is being evaluated via a billable video visit.      How would you like to obtain your AVS? MyChart  If the video visit is dropped, the invitation should be resent by: Text to cell phone: [phone]  Will anyone else be joining your video visit? No    Video-Visit Details    Type of service:  Video Visit    Video see below    Originating Location (pt. Location): Home    Distant Location (provider location):  [facility] ENDOCRINOLOGY [facility]     Platform used for Video Visit: [email or web address]
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
## 2021-12-07 NOTE — PATIENT INSTRUCTIONS
Nice to talk with you in virtual clinic    - Start Alendronate (Fosamax) 70 mg weekly. Take it on empty stomach with glass full of water and stay upright for atleast 30 min after.    - Decrease Vit D to 5000 international unit(s) daily  -Recheck Vit D level in 2-3 months    - Do weight bearing exercises.  - Repeat DXA in 2 years  - Follow up after DXA scan.  _________________________________________________________________________    CALCIUM Recommendation 1500 mg/day in divided dose of no more than 500 mg/dose. This includes what is in your food and also what is in any supplements you are taking.      Dietary sources of calcium: These also contain vitamin D  Milk / buttermilk              8 oz            300 mg  Dry milk powder       5 Tbsp             300 mg  Yogurt                          1 cup           400 mg  Ice cream   1/2 cup          100 mg  Hard cheese                     1.5 oz          300 mg  Cottage cheese                1/2 cup        65 mg  Spinach, cooked  1 cup  240 mg  Tofu, soft regular           4 oz          120 to 390 mg  Sardines                       3 oz               370 mg  Mackerel canned         3 oz                250 mg  Canned salmon with bones 3 oz        170-210 mg  Calcium fortified cereals are available  SILK soy and almond milk products are calcium fortified  Orange juice  Fortified with Calcium       8 oz            300 mg  Low fat dairy sources are recommended      Recommended exercise goals:    30 minutes of moderate exercise on most days of the week .  Weight bearing exercise (ie, walking, jogging, hiking, dancing)    Strength training 2 or more times/week in addition to other weight -being exercise.  Strength training -- uses weight or resistance beyond that seen in everyday activities -(pilates, weight training with free weights, weight machines or resistance bands)    OSTEOPOROSIS of the spine: avoid exercise machines that incorporate spinal flexion, trunk rotation, or  forward bending   Specifically avoid abdominal exercisers, stationary bikes with moving handles, cross-country ski machines, rowing machines, and bicep curl machines  Spinal fractures: AVOID activities that place significant force on the spine such as horse back riding,  tennis, golf or bowling    Best Wishes,  Dr. Yvette Cade MD, MPH  Endocrinologist

## 2021-12-09 ENCOUNTER — HOSPITAL ENCOUNTER (OUTPATIENT)
Dept: MRI IMAGING | Facility: CLINIC | Age: 51
Discharge: HOME | End: 2021-12-09
Attending: INTERNAL MEDICINE | Admitting: INTERNAL MEDICINE
Payer: COMMERCIAL

## 2021-12-09 DIAGNOSIS — C50.811 MALIGNANT NEOPLASM OF OVERLAPPING SITES OF RIGHT FEMALE BREAST, UNSPECIFIED ESTROGEN RECEPTOR STATUS (H): ICD-10-CM

## 2021-12-09 PROCEDURE — 77049 MRI BREAST C-+ W/CAD BI: CPT

## 2021-12-09 PROCEDURE — 255N000002 HC RX 255 OP 636: Performed by: INTERNAL MEDICINE

## 2021-12-09 PROCEDURE — A9585 GADOBUTROL INJECTION: HCPCS | Performed by: INTERNAL MEDICINE

## 2021-12-09 RX ORDER — GADOBUTROL 604.72 MG/ML
0.1 INJECTION INTRAVENOUS ONCE
Status: COMPLETED | OUTPATIENT
Start: 2021-12-09 | End: 2021-12-09

## 2021-12-09 RX ADMIN — GADOBUTROL 4.8 ML: 604.72 INJECTION INTRAVENOUS at 13:26

## 2022-01-08 ENCOUNTER — HEALTH MAINTENANCE LETTER (OUTPATIENT)
Age: 52
End: 2022-01-08

## 2022-02-04 ENCOUNTER — TRANSFERRED RECORDS (OUTPATIENT)
Dept: HEALTH INFORMATION MANAGEMENT | Facility: CLINIC | Age: 52
End: 2022-02-04
Payer: COMMERCIAL

## 2022-02-04 ENCOUNTER — TRANSFERRED RECORDS (OUTPATIENT)
Dept: SURGERY | Facility: CLINIC | Age: 52
End: 2022-02-04
Payer: COMMERCIAL

## 2022-06-13 ENCOUNTER — HOSPITAL ENCOUNTER (OUTPATIENT)
Dept: MAMMOGRAPHY | Facility: CLINIC | Age: 52
Discharge: HOME | End: 2022-06-13
Attending: FAMILY MEDICINE | Admitting: FAMILY MEDICINE
Payer: COMMERCIAL

## 2022-06-13 DIAGNOSIS — Z12.31 VISIT FOR SCREENING MAMMOGRAM: ICD-10-CM

## 2022-06-13 PROCEDURE — 77067 SCR MAMMO BI INCL CAD: CPT

## 2022-06-14 NOTE — RESULT ENCOUNTER NOTE
Your mammogram was normal.     Thank you so much for choosing [facility].  Please contact us with any questions that you may have.   We appreciate the opportunity to serve you now and look forward to supporting your healthcare needs for a long time to come!    Most Sincerely,     Giselle Mcdonnell MD

## 2022-07-18 ENCOUNTER — LAB (OUTPATIENT)
Dept: LAB | Facility: CLINIC | Age: 52
End: 2022-07-18
Attending: FAMILY MEDICINE

## 2022-07-18 DIAGNOSIS — Z20.822 CLOSE EXPOSURE TO 2019 NOVEL CORONAVIRUS: ICD-10-CM

## 2022-08-09 ENCOUNTER — TRANSFERRED RECORDS (OUTPATIENT)
Dept: HEALTH INFORMATION MANAGEMENT | Facility: CLINIC | Age: 52
End: 2022-08-09

## 2022-08-09 ENCOUNTER — TRANSFERRED RECORDS (OUTPATIENT)
Dept: SURGERY | Facility: CLINIC | Age: 52
End: 2022-08-09

## 2022-09-27 ENCOUNTER — MYC MEDICAL ADVICE (OUTPATIENT)
Dept: FAMILY MEDICINE | Facility: CLINIC | Age: 52
End: 2022-09-27

## 2022-09-27 DIAGNOSIS — M80.00XD AGE-RELATED OSTEOPOROSIS WITH CURRENT PATHOLOGICAL FRACTURE WITH ROUTINE HEALING, SUBSEQUENT ENCOUNTER: Primary | ICD-10-CM

## 2022-09-27 DIAGNOSIS — S22.080D T12 COMPRESSION FRACTURE, WITH ROUTINE HEALING, SUBSEQUENT ENCOUNTER: ICD-10-CM

## 2022-10-05 PROBLEM — M80.00XD AGE-RELATED OSTEOPOROSIS WITH CURRENT PATHOLOGICAL FRACTURE WITH ROUTINE HEALING, SUBSEQUENT ENCOUNTER: Status: ACTIVE | Noted: 2022-10-05

## 2022-10-19 DIAGNOSIS — M80.00XS OSTEOPOROSIS WITH CURRENT PATHOLOGICAL FRACTURE, UNSPECIFIED OSTEOPOROSIS TYPE, SEQUELA: ICD-10-CM

## 2022-10-19 DIAGNOSIS — S22.080A T12 COMPRESSION FRACTURE, INITIAL ENCOUNTER (H): ICD-10-CM

## 2022-10-24 RX ORDER — ALENDRONATE SODIUM 70 MG/1
70 TABLET ORAL
Qty: 12 TABLET | Refills: 3 | Status: SHIPPED | OUTPATIENT
Start: 2022-10-24 | End: 2023-09-17

## 2022-10-24 NOTE — TELEPHONE ENCOUNTER
Last Clinic Visit: 12/6/2021  [facility] [place]  Recommended 2 year follow up  Last clinic note:  Plan:  - Start Alendronate 70 mg weekly. Advised her to take it on empty stomach with glass full of water and staying upright for at least 30 min after.  - Decrease Vit D to 5000 international unit(s) daily  -Recheck Vit D level in 2-3 months  - Advised to do weight bearing exercises.  - Repeat DXA in 2 years  - Follow up after DXA scan

## 2022-11-20 ENCOUNTER — HEALTH MAINTENANCE LETTER (OUTPATIENT)
Age: 52
End: 2022-11-20

## 2022-12-26 ENCOUNTER — HOSPITAL ENCOUNTER (OUTPATIENT)
Dept: MRI IMAGING | Facility: CLINIC | Age: 52
Discharge: HOME | End: 2022-12-26
Attending: INTERNAL MEDICINE | Admitting: INTERNAL MEDICINE
Payer: COMMERCIAL

## 2022-12-26 DIAGNOSIS — C50.811 CANCER OF MIDLINE OF BREAST, RIGHT (H): ICD-10-CM

## 2022-12-26 PROCEDURE — 255N000002 HC RX 255 OP 636: Performed by: INTERNAL MEDICINE

## 2022-12-26 PROCEDURE — A9585 GADOBUTROL INJECTION: HCPCS | Performed by: INTERNAL MEDICINE

## 2022-12-26 PROCEDURE — 77049 MRI BREAST C-+ W/CAD BI: CPT

## 2022-12-26 RX ORDER — GADOBUTROL 604.72 MG/ML
5 INJECTION INTRAVENOUS ONCE
Status: COMPLETED | OUTPATIENT
Start: 2022-12-26 | End: 2022-12-26

## 2022-12-26 RX ADMIN — GADOBUTROL 5 ML: 604.72 INJECTION INTRAVENOUS at 10:42

## 2022-12-28 ENCOUNTER — HOSPITAL ENCOUNTER (OUTPATIENT)
Dept: ULTRASOUND IMAGING | Facility: CLINIC | Age: 52
Discharge: HOME | End: 2022-12-28
Attending: FAMILY MEDICINE | Admitting: FAMILY MEDICINE
Payer: COMMERCIAL

## 2022-12-28 DIAGNOSIS — R92.8 ABNORMAL MRI, BREAST: ICD-10-CM

## 2022-12-28 PROCEDURE — 76882 US LMTD JT/FCL EVL NVASC XTR: CPT | Mod: LT

## 2022-12-29 DIAGNOSIS — C50.911 INVASIVE DUCTAL CARCINOMA OF BREAST, RIGHT (H): ICD-10-CM

## 2022-12-29 DIAGNOSIS — R59.9 ENLARGED LYMPH NODE: Primary | ICD-10-CM

## 2023-01-17 ASSESSMENT — ENCOUNTER SYMPTOMS
HEMATURIA: 0
CONSTIPATION: 0
CHILLS: 0
HEARTBURN: 0
ARTHRALGIAS: 0
FEVER: 0
JOINT SWELLING: 0
DYSURIA: 0
PARESTHESIAS: 0
EYE PAIN: 0
HEADACHES: 0
SORE THROAT: 0
NERVOUS/ANXIOUS: 0
FREQUENCY: 0
DIARRHEA: 0
HEMATOCHEZIA: 0
DIZZINESS: 0
COUGH: 0
ABDOMINAL PAIN: 1
BREAST MASS: 0
NAUSEA: 0
SHORTNESS OF BREATH: 0
MYALGIAS: 0
WEAKNESS: 0
PALPITATIONS: 0

## 2023-01-17 ASSESSMENT — ASTHMA QUESTIONNAIRES
QUESTION_1 LAST FOUR WEEKS HOW MUCH OF THE TIME DID YOUR ASTHMA KEEP YOU FROM GETTING AS MUCH DONE AT WORK, SCHOOL OR AT HOME: NONE OF THE TIME
QUESTION_5 LAST FOUR WEEKS HOW WOULD YOU RATE YOUR ASTHMA CONTROL: COMPLETELY CONTROLLED
ACT_TOTALSCORE: 24
QUESTION_3 LAST FOUR WEEKS HOW OFTEN DID YOUR ASTHMA SYMPTOMS (WHEEZING, COUGHING, SHORTNESS OF BREATH, CHEST TIGHTNESS OR PAIN) WAKE YOU UP AT NIGHT OR EARLIER THAN USUAL IN THE MORNING: NOT AT ALL
QUESTION_4 LAST FOUR WEEKS HOW OFTEN HAVE YOU USED YOUR RESCUE INHALER OR NEBULIZER MEDICATION (SUCH AS ALBUTEROL): ONCE A WEEK OR LESS
QUESTION_2 LAST FOUR WEEKS HOW OFTEN HAVE YOU HAD SHORTNESS OF BREATH: NOT AT ALL

## 2023-01-18 ENCOUNTER — OFFICE VISIT (OUTPATIENT)
Dept: FAMILY MEDICINE | Facility: CLINIC | Age: 53
End: 2023-01-18
Payer: COMMERCIAL

## 2023-01-18 VITALS
BODY MASS INDEX: 18.71 KG/M2 | SYSTOLIC BLOOD PRESSURE: 106 MMHG | DIASTOLIC BLOOD PRESSURE: 68 MMHG | HEART RATE: 87 BPM | WEIGHT: 109 LBS | TEMPERATURE: 97.6 F | OXYGEN SATURATION: 99 %

## 2023-01-18 DIAGNOSIS — Z12.11 SCREEN FOR COLON CANCER: ICD-10-CM

## 2023-01-18 DIAGNOSIS — K21.9 GASTROESOPHAGEAL REFLUX DISEASE WITHOUT ESOPHAGITIS: ICD-10-CM

## 2023-01-18 DIAGNOSIS — Z13.6 CARDIOVASCULAR SCREENING; LDL GOAL LESS THAN 160: ICD-10-CM

## 2023-01-18 DIAGNOSIS — Z00.01 ENCOUNTER FOR ROUTINE ADULT HEALTH EXAMINATION WITH ABNORMAL FINDINGS: Primary | ICD-10-CM

## 2023-01-18 DIAGNOSIS — M80.00XD AGE-RELATED OSTEOPOROSIS WITH CURRENT PATHOLOGICAL FRACTURE WITH ROUTINE HEALING, SUBSEQUENT ENCOUNTER: ICD-10-CM

## 2023-01-18 DIAGNOSIS — E78.5 HYPERLIPIDEMIA LDL GOAL <130: ICD-10-CM

## 2023-01-18 DIAGNOSIS — Z11.4 SCREENING FOR HIV (HUMAN IMMUNODEFICIENCY VIRUS): ICD-10-CM

## 2023-01-18 DIAGNOSIS — C50.911 INVASIVE DUCTAL CARCINOMA OF BREAST, RIGHT (H): ICD-10-CM

## 2023-01-18 DIAGNOSIS — E55.9 VITAMIN D DEFICIENCY: ICD-10-CM

## 2023-01-18 DIAGNOSIS — J45.20 MILD INTERMITTENT ASTHMA IN ADULT WITHOUT COMPLICATION: ICD-10-CM

## 2023-01-18 DIAGNOSIS — R10.9 RIGHT FLANK PAIN: ICD-10-CM

## 2023-01-18 PROCEDURE — 99396 PREV VISIT EST AGE 40-64: CPT | Mod: 25 | Performed by: FAMILY MEDICINE

## 2023-01-18 PROCEDURE — 99214 OFFICE O/P EST MOD 30 MIN: CPT | Mod: 25 | Performed by: FAMILY MEDICINE

## 2023-01-18 RX ORDER — ALBUTEROL SULFATE 90 UG/1
2 AEROSOL, METERED RESPIRATORY (INHALATION) EVERY 4 HOURS PRN
Qty: 18 G | Refills: 11 | Status: SHIPPED | OUTPATIENT
Start: 2023-01-18

## 2023-01-18 ASSESSMENT — ENCOUNTER SYMPTOMS
WEAKNESS: 0
HEARTBURN: 0
ARTHRALGIAS: 0
HEADACHES: 0
NAUSEA: 0
COUGH: 0
EYE PAIN: 0
FEVER: 0
CONSTIPATION: 0
CHILLS: 0
DIZZINESS: 0
JOINT SWELLING: 0
PALPITATIONS: 0
HEMATURIA: 0
BREAST MASS: 0
FREQUENCY: 0
DYSURIA: 0
SORE THROAT: 0
PARESTHESIAS: 0
NERVOUS/ANXIOUS: 0
MYALGIAS: 0
ABDOMINAL PAIN: 1
SHORTNESS OF BREATH: 0
HEMATOCHEZIA: 0
DIARRHEA: 0

## 2023-01-18 ASSESSMENT — ASTHMA QUESTIONNAIRES: ACT_TOTALSCORE: 25

## 2023-01-18 ASSESSMENT — ANXIETY QUESTIONNAIRES
7. FEELING AFRAID AS IF SOMETHING AWFUL MIGHT HAPPEN: NOT AT ALL
2. NOT BEING ABLE TO STOP OR CONTROL WORRYING: NOT AT ALL
3. WORRYING TOO MUCH ABOUT DIFFERENT THINGS: NOT AT ALL
GAD7 TOTAL SCORE: 0
5. BEING SO RESTLESS THAT IT IS HARD TO SIT STILL: NOT AT ALL
8. IF YOU CHECKED OFF ANY PROBLEMS, HOW DIFFICULT HAVE THESE MADE IT FOR YOU TO DO YOUR WORK, TAKE CARE OF THINGS AT HOME, OR GET ALONG WITH OTHER PEOPLE?: NOT DIFFICULT AT ALL
GAD7 TOTAL SCORE: 0
1. FEELING NERVOUS, ANXIOUS, OR ON EDGE: NOT AT ALL
GAD7 TOTAL SCORE: 0
4. TROUBLE RELAXING: NOT AT ALL
7. FEELING AFRAID AS IF SOMETHING AWFUL MIGHT HAPPEN: NOT AT ALL
IF YOU CHECKED OFF ANY PROBLEMS ON THIS QUESTIONNAIRE, HOW DIFFICULT HAVE THESE PROBLEMS MADE IT FOR YOU TO DO YOUR WORK, TAKE CARE OF THINGS AT HOME, OR GET ALONG WITH OTHER PEOPLE: NOT DIFFICULT AT ALL
6. BECOMING EASILY ANNOYED OR IRRITABLE: NOT AT ALL

## 2023-01-18 ASSESSMENT — PATIENT HEALTH QUESTIONNAIRE - PHQ9
SUM OF ALL RESPONSES TO PHQ QUESTIONS 1-9: 0
SUM OF ALL RESPONSES TO PHQ QUESTIONS 1-9: 0

## 2023-01-18 NOTE — LETTER
My Asthma Action Plan    Name: Jono Templeton   YOB: 1970  Date: 1/18/2023   My doctor: Giselle Mcdonnell MD   My clinic: [facility] [place]        My Rescue Medicine:   Albuterol inhaler (Proair/Ventolin/Proventil HFA)  2-4 puffs EVERY 4 HOURS as needed. Use a spacer if recommended by your provider.   My Asthma Severity:   Intermittent / Exercise Induced  Know your asthma triggers: smoke, upper respiratory infections, dust mites, pollens, animal dander, insects/rodents, mold, humidity, aspirin, strong odors and fumes, occupational exposure, exercise or sports, emotions, cold air and Gastric Reflux  cold air          GREEN ZONE   Good Control    I feel good    No cough or wheeze    Can work, sleep and play without asthma symptoms       Take your asthma control medicine every day.     1. If exercise triggers your asthma, take your rescue medication    15 minutes before exercise or sports, and    During exercise if you have asthma symptoms  2. Spacer to use with inhaler: If you have a spacer, make sure to use it with your inhaler             YELLOW ZONE Getting Worse  I have ANY of these:    I do not feel good    Cough or wheeze    Chest feels tight    Wake up at night   1. Keep taking your Green Zone medications  2. Start taking your rescue medicine:    every 20 minutes for up to 1 hour. Then every 4 hours for 24-48 hours.  3. If you stay in the Yellow Zone for more than 12-24 hours, contact your doctor.  4. If you do not return to the Green Zone in 12-24 hours or you get worse, start taking your oral steroid medicine if prescribed by your provider.           RED ZONE Medical Alert - Get Help  I have ANY of these:    I feel awful    Medicine is not helping    Breathing getting harder    Trouble walking or talking    Nose opens wide to breathe       1. Take your rescue medicine NOW  2. If your provider has prescribed an oral steroid medicine, start taking it NOW  3. Call your  doctor NOW  4. If you are still in the Red Zone after 20 minutes and you have not reached your doctor:    Take your rescue medicine again and    Call 911 or go to the emergency room right away    See your regular doctor within 2 weeks of an Emergency Room or Urgent Care visit for follow-up treatment.          Annual Reminders:  Meet with Asthma Educator,  Flu Shot in the Fall, consider Pneumonia Vaccination for patients with asthma (aged 19 and older).    Pharmacy:    [facility] [place] [address] [id number] - [place], [place] - [address] [address]    Electronically signed by Giselle Mcdonnell MD   Date: 01/18/23                    Asthma Triggers  How To Control Things That Make Your Asthma Worse    Triggers are things that make your asthma worse.  Look at the list below to help you find your triggers and   what you can do about them. You can help prevent asthma flare-ups by staying away from your triggers.      Trigger                                                          What you can do   Cigarette Smoke  Tobacco smoke can make asthma worse. Do not allow smoking in your home, car or around you.  Be sure no one smokes at a child s day care or school.  If you smoke, ask your health care provider for ways to help you quit.  Ask family members to quit too.  Ask your health care provider for a referral to Quit Plan to help you quit smoking, or call 9-694-743-PLAN.     Colds, Flu, Bronchitis  These are common triggers of asthma. Wash your hands often.  Don t touch your eyes, nose or mouth.  Get a flu shot every year.     Dust Mites  These are tiny bugs that live in cloth or carpet. They are too small to see. Wash sheets and blankets in hot water every week.   Encase pillows and mattress in dust mite proof covers.  Avoid having carpet if you can. If you have carpet, vacuum weekly.   Use a dust mask and HEPA vacuum.   Pollen and Outdoor Mold  Some people are  allergic to trees, grass, or weed pollen, or molds. Try to keep your windows closed.  Limit time out doors when pollen count is high.   Ask you health care provider about taking medicine during allergy season.     Animal Dander  Some people are allergic to skin flakes, urine or saliva from pets with fur or feathers. Keep pets with fur or feathers out of your home.    If you can t keep the pet outdoors, then keep the pet out of your bedroom.  Keep the bedroom door closed.  Keep pets off cloth furniture and away from stuffed toys.     Mice, Rats, and Cockroaches  Some people are allergic to the waste from these pests.   Cover food and garbage.  Clean up spills and food crumbs.  Store grease in the refrigerator.   Keep food out of the bedroom.   Indoor Mold  This can be a trigger if your home has high moisture. Fix leaking faucets, pipes, or other sources of water.   Clean moldy surfaces.  Dehumidify basement if it is damp and smelly.   Smoke, Strong Odors, and Sprays  These can reduce air quality. Stay away from strong odors and sprays, such as perfume, powder, hair spray, paints, smoke incense, paint, cleaning products, candles and new carpet.   Exercise or Sports  Some people with asthma have this trigger. Be active!  Ask your doctor about taking medicine before sports or exercise to prevent symptoms.    Warm up for 5-10 minutes before and after sports or exercise.     Other Triggers of Asthma  Cold air:  Cover your nose and mouth with a scarf.  Sometimes laughing or crying can be a trigger.  Some medicines and food can trigger asthma.
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
## 2023-01-18 NOTE — PATIENT INSTRUCTIONS
Preventive Health Recommendations  Female Ages 50 - 64    Yearly exam: See your health care provider every year in order to  Review health changes.   Discuss preventive care.    Review your medicines if your doctor has prescribed any.    Get a Pap test every three years (unless you have an abnormal result and your provider advises testing more often).  If you get Pap tests with HPV test, you only need to test every 5 years, unless you have an abnormal result.   You do not need a Pap test if your uterus was removed (hysterectomy) and you have not had cancer.  You should be tested each year for STDs (sexually transmitted diseases) if you're at risk.   Have a mammogram every 1 to 2 years.  Have a colonoscopy at age 50, or have a yearly FIT test (stool test). These exams screen for colon cancer.    Have a cholesterol test every 5 years, or more often if advised.  Have a diabetes test (fasting glucose) every three years. If you are at risk for diabetes, you should have this test more often.   If you are at risk for osteoporosis (brittle bone disease), think about having a bone density scan (DEXA).    Shots: Get a flu shot each year. Get a tetanus shot every 10 years.    Nutrition:   Eat at least 5 servings of fruits and vegetables each day.  Eat whole-grain bread, whole-wheat pasta and brown rice instead of white grains and rice.  Get adequate Calcium and Vitamin D.     Lifestyle  Exercise at least 150 minutes a week (30 minutes a day, 5 days a week). This will help you control your weight and prevent disease.  Limit alcohol to one drink per day.  No smoking.   Wear sunscreen to prevent skin cancer.   See your dentist every six months for an exam and cleaning.  See your eye doctor every 1 to 2 years.      Thank you so much or choosing [facility]  for your Health Care. It was a pleasure seeing you at your visit today! Please contact us with any questions or concerns you may have.                "    Giselle Mcdonnell MD                              To reach your [facility] care team after hours call:   [phone] press #2 \"to speak with your care team\".  This will get you to our clinic instead of routing to central [facility]  scheduling.     PLEASE NOTE OUR HOURS HAVE CHANGED secondary to COVID-19 coronavirus pandemic, as we are trying to minimize patient exposure to the virus,  which is now widespread in the state.  These hours may change with very little notice.  We apologize for any inconvenience.       Our current clinic hours are:          Monday- Thursday   7:00am - 6:00pm  in person.      Friday  7:00am- 5:00pm                       Saturday and Sunday : Closed to in person and virtual visits        We have telephone and virtual visit times available between    7:00am - 6pm on Monday-Friday as well.                                                Phone:  [phone]      Our pharmacy hours: Monday through Friday 8:00am to 5:00pm                        Saturday - 9:00 am to 12 noon       Sunday : Closed.              Phone:  [phone]              ###  Please note: at this time we are not accepting any walk-in visits. ###      There is also information available at our web site:  [email or web address]    If your provider ordered any lab tests and you do not receive the results within 10 business days, please call the clinic.    If you need a medication refill please contact your pharmacy.  Please allow 3 business days for your refill to be completed.    Our clinic offers telephone visits and e visits.  Please ask one of your team members to explain more.      Use [employer] (secure email communication and access to your chart) to send your primary care provider a message or make an appointment. Ask someone on your Team how to sign up for [employer].             "

## 2023-01-20 ENCOUNTER — TELEPHONE (OUTPATIENT)
Dept: GASTROENTEROLOGY | Facility: CLINIC | Age: 53
End: 2023-01-20
Payer: COMMERCIAL

## 2023-01-20 NOTE — TELEPHONE ENCOUNTER
"    Screening Questions  BLUE  KIND OF PREP RED  LOCATION [review exclusion criteria] GREEN  SEDATION TYPE        y Are you active on mychart?       [facility] Ordering/Referring Provider?        [employer] What type of coverage do you have?      n Have you had a positive covid test in the last 14 days?     18.5 1. BMI  [BMI 40+ - review exclusion criteria]    y  2. Are you able to give consent for your medical care? [IF NO,RN REVIEW]          n  3. Are you taking any prescription pain medications on a routine schedule   (ex narcotics: tramadol, oxycodone, roxicodone, oxycontin,  and percocet)?        n  3a. EXTENDED PREP What kind of prescription?     n 4. Do you have any chemical dependencies such as alcohol, street drugs, or methadone?        **If yes 3- 5 , please schedule with MAC sedation.**          IF YES TO ANY 6 - 10 - HOSPITAL SETTING ONLY.     n 6.   Do you need assistance transferring?     n 7.   Have you had a heart or lung transplant?    n 8.   Are you currently on dialysis?   n 9.   Do you use daily home oxygen?   n 10. Do you take nitroglycerin?   10a. n If yes, how often?     11. [FEMALES]  n Are you currently pregnant?    11a. n If yes, how many weeks? [ Greater than 12 weeks, OR NEEDED]    n 12. Do you have Pulmonary Hypertension? *NEED PAC APPT AT [facility]*     n 13. [review exclusion criteria]  Do you have any implantable devices in your body (pacemaker, defib, LVAD)?    n 14. In the past 6 months, have you had any heart related issues including cardiomyopathy or heart attack?     14a. n If yes, did it require cardiac stenting if so when?     n 15. Have you had a stroke or Transient ischemic attack (TIA - aka  mini stroke ) within 6 months?      n 16. Do you have mod to severe Obstructive Sleep Apnea?  [Hospital only]    n 17. Do you have SEVERE AND UNCONTROLLED asthma? *NEED PAC APPT AT [facility]*     n 18. Are you currently taking any blood thinners?     18a. If yes, inform patient to \"follow up w/ " "ordering provider for bridging instructions.\"    n 19. Do you take the medication Phentermine?    19a. If yes, \"Hold for 7 days before procedure.  Please consult your prescribing provider if you have questions about holding this medication.\"     n  20. Do you have chronic kidney disease?      n  21. Do you have a diagnosis of diabetes?     n  22. On a regular basis do you go 3-5 days between bowel movements?     n 23. Preferred LOCAL Pharmacy for Pre Prescription    [ LIST ONLY ONE PHARMACY]        [facility] - [place], [place] - [address] [place] [address] [place]          - CLOSING REMINDERS -    Informed patient they will need an adult    Cannot take any type of public or medical transportation alone    Conscious Sedation- Needs  for 6 hours after the procedure       MAC/General-Needs  for 24 hours after procedure    Pre-Procedure Covid test to be completed [[facility] PCR Testing Required]    Confirmed Nurse will call to complete assessment       - SCHEDULING DETAILS -  n Hospital Setting Required? If yes, what is the exclusion?:    Radha  Surgeon    6/15/23  Date of Procedure  Lower Endoscopy [Colonoscopy]  Type of Procedure Scheduled  [facility]-If you answer yes to questions #8, #20, #21Which Colonoscopy Prep was Sent?     CS Sedation Type     n PAC / Pre-op Required                 "

## 2023-01-30 ENCOUNTER — TELEPHONE (OUTPATIENT)
Dept: GASTROENTEROLOGY | Facility: CLINIC | Age: 53
End: 2023-01-30
Payer: COMMERCIAL

## 2023-01-30 NOTE — TELEPHONE ENCOUNTER
Caller: Jono Templeton    Reason for Reschedule/Cancellation (please be detailed, any staff messages or encounters to note?): per pt request      Prior to reschedule please review:    Ordering Provider:Giselle Mcdonnell MD     Sedation per order:cs    Does patient have any ASC Exclusions, please identify?:       Notes on Cancelled Procedure:    Procedure:Lower Endoscopy [Colonoscopy]     Date: 0615/2023    Location:[facility]; [address] [place] [address]    Surgeon: viv        Rescheduled: Yes    Procedure: Lower Endoscopy [Colonoscopy]     Date: 07/03/2023    Location:[facility]; [address] [place] [address]    Surgeon: zak    Sedation Level Scheduled  cs,  Reason for Sedation Level screening    Prep/Instructions updated and sent: licha

## 2023-02-02 ENCOUNTER — LAB (OUTPATIENT)
Dept: LAB | Facility: CLINIC | Age: 53
End: 2023-02-02
Payer: COMMERCIAL

## 2023-02-02 DIAGNOSIS — Z11.4 SCREENING FOR HIV (HUMAN IMMUNODEFICIENCY VIRUS): ICD-10-CM

## 2023-02-02 DIAGNOSIS — R10.9 RIGHT FLANK PAIN: ICD-10-CM

## 2023-02-02 DIAGNOSIS — Z13.6 CARDIOVASCULAR SCREENING; LDL GOAL LESS THAN 160: ICD-10-CM

## 2023-02-02 DIAGNOSIS — E55.9 VITAMIN D DEFICIENCY: ICD-10-CM

## 2023-02-02 LAB
ALBUMIN SERPL BCG-MCNC: 4.3 G/DL (ref 3.5–5.2)
ALBUMIN UR-MCNC: NEGATIVE MG/DL
ALP SERPL-CCNC: 45 U/L (ref 35–104)
ALT SERPL W P-5'-P-CCNC: 21 U/L (ref 10–35)
ANION GAP SERPL CALCULATED.3IONS-SCNC: 11 MMOL/L (ref 7–15)
APPEARANCE UR: CLEAR
AST SERPL W P-5'-P-CCNC: 32 U/L (ref 10–35)
BACTERIA #/AREA URNS HPF: NORMAL /HPF
BILIRUB SERPL-MCNC: 0.3 MG/DL
BILIRUB UR QL STRIP: NEGATIVE
BUN SERPL-MCNC: 13.1 MG/DL (ref 6–20)
CALCIUM SERPL-MCNC: 9.2 MG/DL (ref 8.6–10)
CHLORIDE SERPL-SCNC: 103 MMOL/L (ref 98–107)
CHOLEST SERPL-MCNC: 195 MG/DL
COLOR UR AUTO: YELLOW
CREAT SERPL-MCNC: 0.65 MG/DL (ref 0.51–0.95)
DEPRECATED HCO3 PLAS-SCNC: 24 MMOL/L (ref 22–29)
ERYTHROCYTE [DISTWIDTH] IN BLOOD BY AUTOMATED COUNT: 12 % (ref 10–15)
GFR SERPL CREATININE-BSD FRML MDRD: >90 ML/MIN/1.73M2
GLUCOSE SERPL-MCNC: 94 MG/DL (ref 70–99)
GLUCOSE UR STRIP-MCNC: NEGATIVE MG/DL
HCT VFR BLD AUTO: 39.4 % (ref 35–47)
HDLC SERPL-MCNC: 94 MG/DL
HGB BLD-MCNC: 13.5 G/DL (ref 11.7–15.7)
HGB UR QL STRIP: ABNORMAL
HIV 1+2 AB+HIV1 P24 AG SERPL QL IA: NONREACTIVE
KETONES UR STRIP-MCNC: NEGATIVE MG/DL
LDLC SERPL CALC-MCNC: 90 MG/DL
LEUKOCYTE ESTERASE UR QL STRIP: NEGATIVE
MCH RBC QN AUTO: 30.8 PG (ref 26.5–33)
MCHC RBC AUTO-ENTMCNC: 34.3 G/DL (ref 31.5–36.5)
MCV RBC AUTO: 90 FL (ref 78–100)
NITRATE UR QL: NEGATIVE
NONHDLC SERPL-MCNC: 101 MG/DL
PH UR STRIP: 7 [PH] (ref 5–7)
PLATELET # BLD AUTO: 187 10E3/UL (ref 150–450)
POTASSIUM SERPL-SCNC: 4.1 MMOL/L (ref 3.4–5.3)
PROT SERPL-MCNC: 6.7 G/DL (ref 6.4–8.3)
RBC # BLD AUTO: 4.38 10E6/UL (ref 3.8–5.2)
RBC #/AREA URNS AUTO: NORMAL /HPF
SODIUM SERPL-SCNC: 138 MMOL/L (ref 136–145)
SP GR UR STRIP: 1.01 (ref 1–1.03)
TRIGL SERPL-MCNC: 56 MG/DL
TSH SERPL DL<=0.005 MIU/L-ACNC: 2.3 UIU/ML (ref 0.3–4.2)
UROBILINOGEN UR STRIP-ACNC: 0.2 E.U./DL
WBC # BLD AUTO: 3.8 10E3/UL (ref 4–11)
WBC #/AREA URNS AUTO: NORMAL /HPF

## 2023-02-02 PROCEDURE — 36415 COLL VENOUS BLD VENIPUNCTURE: CPT

## 2023-02-02 PROCEDURE — 87389 HIV-1 AG W/HIV-1&-2 AB AG IA: CPT

## 2023-02-02 PROCEDURE — 80053 COMPREHEN METABOLIC PANEL: CPT

## 2023-02-02 PROCEDURE — 85027 COMPLETE CBC AUTOMATED: CPT

## 2023-02-02 PROCEDURE — 84443 ASSAY THYROID STIM HORMONE: CPT

## 2023-02-02 PROCEDURE — 81001 URINALYSIS AUTO W/SCOPE: CPT

## 2023-02-02 PROCEDURE — 80061 LIPID PANEL: CPT

## 2023-02-02 PROCEDURE — 82306 VITAMIN D 25 HYDROXY: CPT

## 2023-02-05 LAB
DEPRECATED CALCIDIOL+CALCIFEROL SERPL-MC: <51 UG/L (ref 20–75)
VITAMIN D2 SERPL-MCNC: <5 UG/L
VITAMIN D3 SERPL-MCNC: 46 UG/L

## 2023-02-13 PROBLEM — E78.5 HYPERLIPIDEMIA LDL GOAL <130: Status: ACTIVE | Noted: 2023-02-13

## 2023-02-15 ENCOUNTER — TRANSFERRED RECORDS (OUTPATIENT)
Dept: SURGERY | Facility: CLINIC | Age: 53
End: 2023-02-15
Payer: COMMERCIAL

## 2023-02-15 ENCOUNTER — TRANSFERRED RECORDS (OUTPATIENT)
Dept: HEALTH INFORMATION MANAGEMENT | Facility: CLINIC | Age: 53
End: 2023-02-15

## 2023-02-25 ENCOUNTER — OFFICE VISIT (OUTPATIENT)
Dept: URGENT CARE | Facility: URGENT CARE | Age: 53
End: 2023-02-25
Payer: COMMERCIAL

## 2023-02-25 ENCOUNTER — E-VISIT (OUTPATIENT)
Dept: URGENT CARE | Facility: CLINIC | Age: 53
End: 2023-02-25
Payer: COMMERCIAL

## 2023-02-25 VITALS
OXYGEN SATURATION: 98 % | TEMPERATURE: 97.1 F | RESPIRATION RATE: 16 BRPM | DIASTOLIC BLOOD PRESSURE: 76 MMHG | HEART RATE: 79 BPM | SYSTOLIC BLOOD PRESSURE: 112 MMHG

## 2023-02-25 DIAGNOSIS — Z53.9 DIAGNOSIS NOT YET DEFINED: Primary | ICD-10-CM

## 2023-02-25 DIAGNOSIS — U07.1 INFECTION DUE TO 2019 NOVEL CORONAVIRUS: ICD-10-CM

## 2023-02-25 DIAGNOSIS — J40 BRONCHITIS: Primary | ICD-10-CM

## 2023-02-25 LAB
FLUAV AG SPEC QL IA: NEGATIVE
FLUBV AG SPEC QL IA: NEGATIVE
SARS-COV-2 RNA RESP QL NAA+PROBE: POSITIVE

## 2023-02-25 PROCEDURE — U0005 INFEC AGEN DETEC AMPLI PROBE: HCPCS | Performed by: FAMILY MEDICINE

## 2023-02-25 PROCEDURE — U0003 INFECTIOUS AGENT DETECTION BY NUCLEIC ACID (DNA OR RNA); SEVERE ACUTE RESPIRATORY SYNDROME CORONAVIRUS 2 (SARS-COV-2) (CORONAVIRUS DISEASE [COVID-19]), AMPLIFIED PROBE TECHNIQUE, MAKING USE OF HIGH THROUGHPUT TECHNOLOGIES AS DESCRIBED BY CMS-2020-01-R: HCPCS | Performed by: FAMILY MEDICINE

## 2023-02-25 PROCEDURE — 99213 OFFICE O/P EST LOW 20 MIN: CPT | Mod: CS | Performed by: FAMILY MEDICINE

## 2023-02-25 PROCEDURE — 87804 INFLUENZA ASSAY W/OPTIC: CPT | Performed by: FAMILY MEDICINE

## 2023-02-25 RX ORDER — DOXYCYCLINE 100 MG/1
100 CAPSULE ORAL 2 TIMES DAILY
Qty: 20 CAPSULE | Refills: 0 | Status: SHIPPED | OUTPATIENT
Start: 2023-02-25 | End: 2023-03-07

## 2023-02-25 NOTE — PATIENT INSTRUCTIONS
Dear Jono Templeton,    We are sorry you are not feeling well. Based on the responses you provided, it is recommended that you be seen in-person in urgent care so we can better evaluate your symptoms. Please click here to find the nearest urgent care location to you.   You will not be charged for this Visit. Thank you for trusting us with your care.    Casandra Soares PA-C

## 2023-02-26 ENCOUNTER — TELEPHONE (OUTPATIENT)
Dept: NURSING | Facility: CLINIC | Age: 53
End: 2023-02-26
Payer: COMMERCIAL

## 2023-02-26 NOTE — TELEPHONE ENCOUNTER
Coronavirus (COVID-19) Notification    Caller Name (Patient, parent, daughter/son, grandparent, etc)  Patient     Reason for call  Notify of Positive Coronavirus (COVID-19) lab results, assess symptoms,  review [facility] recommendations    Lab Result    Lab test:  2019-nCoV rRt-PCR or SARS-CoV-2 PCR    Oropharyngeal AND/OR nasopharyngeal swabs is POSITIVE for 2019-nCoV RNA/SARS-COV-2 PCR (COVID-19 virus)      Gather patient reported symptoms   Assessment   Current Symptoms at time of phone call, reported by patient: (if no symptoms, document: No symptoms] Cough    Date of symptom(s) onset (if applicable) 2/22/23     If at time of call, Patients symptoms have worsened, the Patient should contact 911 or have someone drive them to Emergency Dept promptly:      If Patient calling 911, inform 911 personal that you have tested positive for the Coronavirus (COVID-19).  Place mask on and await 911 to arrive.    If Emergency Dept, If possible, please have another adult drive you to the Emergency Dept but you need to wear mask when in contact with other people.      Treatment Options:   Is patient interested in discussing COVID treatment? No.        Review information with Patient    Your result was positive. This means you have COVID-19 (coronavirus).    How can I protect others?    These guidelines are for isolating before returning to work, school or .    If you DO have symptoms    Stay home and away from others     For at least 5 days after your symptoms started, AND    You are fever free for 24 hours (with no medicine that reduces fever), AND    Your other symptoms are better    Wear a mask for 10 full days anytime you are around others    If you DON'T have symptoms    Stay home and away from others for at least 5 days after your positive test    Wear a mask for 10 full days anytime you are around others    There may be different guidelines for healthcare facilities.  Please check with the specific sites  before arriving.    If you have been told by a doctor that you were severely ill with COVID-19 or are immunocompromised, you should isolate for at least 10 days.    You should not go back to work until you meet the guidelines above for ending your home isolation. You don't need to be retested for COVID-19 before going back to work--studies show that you won't spread the virus if it's been at least 10 days since your symptoms started (or 20 days, if you have a weak immune system).    Employers, schools, and daycares: This is an official notice for this person's medical guidelines for returning in-person.  They must meet the above guidelines before going back to work, school or  in person.    You will receive a positive COVID-19 letter via [email or web address] or the mail soon with additional self-care information.    Would you like me to review some of that information with you now?  No    If you were tested for an upcoming procedure, please contact your provider for next steps.    Callie Moore
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
## 2023-03-29 ENCOUNTER — HOSPITAL ENCOUNTER (OUTPATIENT)
Dept: ULTRASOUND IMAGING | Facility: CLINIC | Age: 53
Discharge: HOME | End: 2023-03-29
Attending: FAMILY MEDICINE | Admitting: FAMILY MEDICINE
Payer: COMMERCIAL

## 2023-03-29 DIAGNOSIS — C50.911 INVASIVE DUCTAL CARCINOMA OF BREAST, RIGHT (H): ICD-10-CM

## 2023-03-29 DIAGNOSIS — R59.9 ENLARGED LYMPH NODE: ICD-10-CM

## 2023-03-29 PROCEDURE — 76882 US LMTD JT/FCL EVL NVASC XTR: CPT | Mod: LT

## 2023-04-19 ENCOUNTER — TELEPHONE (OUTPATIENT)
Dept: GASTROENTEROLOGY | Facility: CLINIC | Age: 53
End: 2023-04-19
Payer: COMMERCIAL

## 2023-04-19 NOTE — TELEPHONE ENCOUNTER
Caller: Jono  Reason for Reschedule/Cancellation (please be detailed, any staff messages or encounters to note?): pt will be out of town      Prior to reschedule please review:    Ordering Provider:Giselle Mcdonnell MD in [facility]    Sedation per order:moderate    Does patient have any ASC Exclusions, please identify?: n      Notes on Cancelled Procedure:    Procedure:Lower Endoscopy [Colonoscopy]     Date: 07/03/2023    Location:[facility]; [facility] [address] [place] [address]    Surgeon: Cornel        Rescheduled: Yes    Procedure: Lower Endoscopy [Colonoscopy]     Date: 06/18/2023    Location:[facility]; [facility] [address] [place] [address]    Surgeon: Radha     Sedation Level Scheduled  moderte,  Reason for Sedation Level per order    Prep/Instructions updated and sent: licha

## 2023-05-18 ENCOUNTER — ANCILLARY PROCEDURE (OUTPATIENT)
Dept: GENERAL RADIOLOGY | Facility: CLINIC | Age: 53
End: 2023-05-18
Attending: PODIATRIST
Payer: COMMERCIAL

## 2023-05-18 ENCOUNTER — OFFICE VISIT (OUTPATIENT)
Dept: PODIATRY | Facility: CLINIC | Age: 53
End: 2023-05-18
Payer: COMMERCIAL

## 2023-05-18 VITALS — SYSTOLIC BLOOD PRESSURE: 114 MMHG | DIASTOLIC BLOOD PRESSURE: 62 MMHG

## 2023-05-18 DIAGNOSIS — M21.41 PES PLANUS OF BOTH FEET: ICD-10-CM

## 2023-05-18 DIAGNOSIS — M20.5X2 HALLUX LIMITUS OF LEFT FOOT: ICD-10-CM

## 2023-05-18 DIAGNOSIS — M21.42 PES PLANUS OF BOTH FEET: ICD-10-CM

## 2023-05-18 DIAGNOSIS — M25.572 PAIN IN JOINT OF LEFT FOOT: Primary | ICD-10-CM

## 2023-05-18 PROCEDURE — 73630 X-RAY EXAM OF FOOT: CPT | Mod: TC | Performed by: RADIOLOGY

## 2023-05-18 PROCEDURE — 99203 OFFICE O/P NEW LOW 30 MIN: CPT | Performed by: PODIATRIST
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
## 2023-05-18 NOTE — PATIENT INSTRUCTIONS
"Thank you for choosing [facility] Podiatry / Foot & Ankle Surgery!    DR. MORRIS'S CLINIC LOCATIONS:     [facility] TRIAGE LINE: [phone]   [address] APPOINTMENTS: [phone]   [place] [address] RADIOLOGY: [phone]   (Every other Tues - Wed - Fri PM) SET UP SURGERY: [phone]    PHYSICAL THERAPY: [phone]   [place] SPECIALTY BILLING QUESTIONS: [phone] [address] FAX: [phone]   [place] [address]    (Thurs & Fri AM)       DEGENERATIVE ARTHRITIS OF THE BIG TOE JOINT   (hallux limitus/hallux rigidus)   Arthritis of the joint at the base of the big toe (metatarsophalangeal joint) has several causes. Usually it results from repetitive trauma to the joint, secondary to abnormal foot mechanics. Often it is hereditary. However, a one-time traumatic event can lead to arthritis. The condition doesn't improve with time, and even with treatment, can worsen. The cartilage wears out, joint surfaces are no longer smooth, bone rubs on bone, inflammation occurs with pain, and eventually bone spurs and loose fragments might develop.   The joint is often painful with activity, worse with flimsy shoes or walking barefoot, and it slowly progresses over time. A person might notice the toe \"locking up\" with walking. There often is an obvious, and irritating, bony bump on top of the foot. Shoes might be uncomfortable. In some people the pain is so bothersome that recreational activities sometimes even normal daily activities are difficult to perform.   The pain from this arthritis is likely a combination of joint jamming, cartilage loss and inflammation, and irritation from shoes rubbing on the bump. Sometimes other parts of the foot, leg, or back hurt from altering one's walk to compensate for the painful joint.     Ways to help a person live with the discomfort include wearing a good, supportive shoe with a rigid, rocker-type bottom. An example is a hiking boot. A " rigid sole minimizes bending of the joint, and therefore, joint motion and pain. Shoes with a high toe box allow for less rubbing on the bump. Avoiding barefoot walking, sandals, flip-flops and slippers usually helps.   Sometimes an insert or orthotic provides symptom relief. This might make shoe fit more difficult. Pads over the bump and occasionally injections into the joint provide relief.   Surgery for this condition is aimed towards alleviating pain. It does not cure the arthritis nor does it guarantee better joint motion. Depending on the condition of the metatarsophalangeal joint, there are several surgiqal options:    1.  Cutting off the bony bump(s) and cleaning the joint    2.  Loosening the joint up by making cuts in the first metatarsal bone or the big toe bone and removing a small section of bone.    3.  Repositioning bone to minimize jamming of the joint.    4.  In severe cases, the joint is fused. By fusing the joint, it will never bend again. This resolves the pain, because it's the movement of a worn out joint that causes pain. Oftentimes the operation involves a combination of these procedures and. requires the use of screws, pins, and/or a small surgical plate.     Healing after surgery requires about six weeks of protection. This allows the bone to heal. Maximum recovery takes about one year. The scar tissue and joint structures require this amount of time to finish the healing process. Expect stiffness, swelling and numbness during that time frame.   Surgery for arthritis of the metatarsophalangeal joint does involve side effects. Some side effects are predictable and others are less common but do occur. A scar will be visible and could be irritated by shoes. The shoe may rub on the screw or internal pin requiring surgical removal of these fixation devices. The screw and pin would likely be left in place for a full year. The first toe may remain stiff after surgery. The amount of stiffness is  variable. Most people never regain normal motion of the first toe. This is due to scar tissue inherent to any surgery, in addition to the cumUlative effects of arthritis. Sometimes the big toe drifts to one side or the other. Joint fusion is one option to correct an unstable, drifting toe. This procedure straightens the toe, however, no motion remains.   All surgical procedures involve risk of infection, numbness, pain, delayed bone healing, osteotomy (bone cut) dislocation, blood clots, continued foot pain, etc. Arthritic joint surgery is quite complex and should not be taken lightly.    Any skin incision can lead to infection. Deep infection might involve the bone and thus repeat surgery and six weeks of IV antibiotics. Scar tissue can cause nerve pain or numbness. his is generally temporary but can be permanent. We do not have treatments that cure nerve problems. Second toe pain could be related to altered mechanics and pressure transferred to the second toe. Delayed bone healing would lengthen the healing time. Some bones simply do not heal. This requires repeat surgery, electronic bone stimulation and/or extended protection. Smokers have an approximate 20% chance of poor bone healing. This is double that of a non-smoker. The bone cut may displace. This may need to be repaired with a second operation. Displacement can cause joint malalignment. Immobility after surgery can cause a blood clot in the legs and lungs. This could result in death.   Foot pain is complex. Most feet hurt for more than one reason. Operating on the arthritic   big toe joint will not necessarily create a pain free foot. Appropriate shoes, healthy body weight, avoidance of bare foot walking and moderation of activity will always be   necessary to enjoy foot comfort. Arthritis is incurable even with surgery.     Surgery for this type of arthritis is nevertheless quite successful. Most surgical patients are pleased with their foot following  surgery. Many of the issues described above can be controlled by taking proper care of your foot during the healing process.   Cosmetic bump surgery is discouraged for the reasons listed above. A bump and joint that is comfortable when wearing appropriate shoes should simply be treated with appropriate shoes.   Your surgeon would be happy to fully describe any of the above issues. You should pursue a full understanding of the operation, recovery process and any potential problems that could develop.     [place] [facility] LOCATIONS  [facility]  [address]  [place] [address]  Phone: [phone]  Fax: [phone] [facility]  [address]  [place] [address]  Phone: [phone]   Fax: [phone]   [facility]  [address]  [place] [address]  Phone: [phone]  Fax: [phone] [facility]  [address]  [place] [address]  Phone: [phone]   Fax: [phone]   [facility]   [address]  [place] [address]  Phone: [phone]  Fax: [phone] * Please call any location listed to make an appointment for a casting/fitting. Your referral was sent to their central office and they will all have the order on file.       [facility] LOCATIONS  [place]  [address]  [phone]   [address]  [phone] [place]  [address]  [phone]   [place]  [address]  [phone]   [place]  [address]  [phone] [place] [address]  [phone]   [place]  [address]  [phone]   [place]  [address]  [phone] [place]  [address]  [phone]     Follow Up: As Needed

## 2023-05-23 ENCOUNTER — PATIENT OUTREACH (OUTPATIENT)
Dept: CARE COORDINATION | Facility: CLINIC | Age: 53
End: 2023-05-23
Payer: COMMERCIAL

## 2023-07-18 ENCOUNTER — HOSPITAL ENCOUNTER (OUTPATIENT)
Facility: CLINIC | Age: 53
Discharge: HOME | End: 2023-07-18
Attending: INTERNAL MEDICINE | Admitting: INTERNAL MEDICINE
Payer: COMMERCIAL

## 2023-07-18 VITALS
HEIGHT: 64 IN | SYSTOLIC BLOOD PRESSURE: 105 MMHG | RESPIRATION RATE: 16 BRPM | WEIGHT: 109 LBS | BODY MASS INDEX: 18.61 KG/M2 | OXYGEN SATURATION: 99 % | DIASTOLIC BLOOD PRESSURE: 72 MMHG | TEMPERATURE: 97.2 F | HEART RATE: 65 BPM

## 2023-07-18 LAB — COLONOSCOPY: NORMAL

## 2023-07-18 PROCEDURE — G0105 COLORECTAL SCRN; HI RISK IND: HCPCS | Performed by: INTERNAL MEDICINE

## 2023-07-18 PROCEDURE — 250N000011 HC RX IP 250 OP 636: Performed by: INTERNAL MEDICINE

## 2023-07-18 PROCEDURE — G0500 MOD SEDAT ENDO SERVICE >5YRS: HCPCS | Performed by: INTERNAL MEDICINE

## 2023-07-18 PROCEDURE — 45378 DIAGNOSTIC COLONOSCOPY: CPT | Performed by: INTERNAL MEDICINE

## 2023-07-18 RX ORDER — ONDANSETRON 2 MG/ML
4 INJECTION INTRAMUSCULAR; INTRAVENOUS EVERY 6 HOURS PRN
Status: DISCONTINUED | OUTPATIENT
Start: 2023-07-18 | End: 2023-07-18 | Stop reason: HOSPADM

## 2023-07-18 RX ORDER — ONDANSETRON 2 MG/ML
4 INJECTION INTRAMUSCULAR; INTRAVENOUS
Status: DISCONTINUED | OUTPATIENT
Start: 2023-07-18 | End: 2023-07-18 | Stop reason: HOSPADM

## 2023-07-18 RX ORDER — EPINEPHRINE 1 MG/ML
0.1 INJECTION, SOLUTION INTRAMUSCULAR; SUBCUTANEOUS
Status: DISCONTINUED | OUTPATIENT
Start: 2023-07-18 | End: 2023-07-18 | Stop reason: HOSPADM

## 2023-07-18 RX ORDER — FLUMAZENIL 0.1 MG/ML
0.2 INJECTION, SOLUTION INTRAVENOUS
Status: DISCONTINUED | OUTPATIENT
Start: 2023-07-18 | End: 2023-07-18 | Stop reason: HOSPADM

## 2023-07-18 RX ORDER — NALOXONE HYDROCHLORIDE 0.4 MG/ML
0.2 INJECTION, SOLUTION INTRAMUSCULAR; INTRAVENOUS; SUBCUTANEOUS
Status: DISCONTINUED | OUTPATIENT
Start: 2023-07-18 | End: 2023-07-18 | Stop reason: HOSPADM

## 2023-07-18 RX ORDER — FENTANYL CITRATE 50 UG/ML
50-100 INJECTION, SOLUTION INTRAMUSCULAR; INTRAVENOUS EVERY 5 MIN PRN
Status: DISCONTINUED | OUTPATIENT
Start: 2023-07-18 | End: 2023-07-18 | Stop reason: HOSPADM

## 2023-07-18 RX ORDER — SIMETHICONE 40MG/0.6ML
133 SUSPENSION, DROPS(FINAL DOSAGE FORM)(ML) ORAL
Status: DISCONTINUED | OUTPATIENT
Start: 2023-07-18 | End: 2023-07-18 | Stop reason: HOSPADM

## 2023-07-18 RX ORDER — DIPHENHYDRAMINE HYDROCHLORIDE 50 MG/ML
25-50 INJECTION INTRAMUSCULAR; INTRAVENOUS
Status: DISCONTINUED | OUTPATIENT
Start: 2023-07-18 | End: 2023-07-18 | Stop reason: HOSPADM

## 2023-07-18 RX ORDER — ONDANSETRON 4 MG/1
4 TABLET, ORALLY DISINTEGRATING ORAL EVERY 6 HOURS PRN
Status: DISCONTINUED | OUTPATIENT
Start: 2023-07-18 | End: 2023-07-18 | Stop reason: HOSPADM

## 2023-07-18 RX ORDER — ATROPINE SULFATE 0.1 MG/ML
1 INJECTION INTRAVENOUS
Status: DISCONTINUED | OUTPATIENT
Start: 2023-07-18 | End: 2023-07-18 | Stop reason: HOSPADM

## 2023-07-18 RX ORDER — NALOXONE HYDROCHLORIDE 0.4 MG/ML
0.4 INJECTION, SOLUTION INTRAMUSCULAR; INTRAVENOUS; SUBCUTANEOUS
Status: DISCONTINUED | OUTPATIENT
Start: 2023-07-18 | End: 2023-07-18 | Stop reason: HOSPADM

## 2023-07-18 RX ORDER — LIDOCAINE 40 MG/G
CREAM TOPICAL
Status: DISCONTINUED | OUTPATIENT
Start: 2023-07-18 | End: 2023-07-18 | Stop reason: HOSPADM

## 2023-07-18 RX ORDER — PROCHLORPERAZINE MALEATE 10 MG
10 TABLET ORAL EVERY 6 HOURS PRN
Status: DISCONTINUED | OUTPATIENT
Start: 2023-07-18 | End: 2023-07-18 | Stop reason: HOSPADM

## 2023-07-18 RX ADMIN — MIDAZOLAM 1 MG: 1 INJECTION INTRAMUSCULAR; INTRAVENOUS at 07:56

## 2023-07-18 RX ADMIN — FENTANYL CITRATE 50 MCG: 50 INJECTION, SOLUTION INTRAMUSCULAR; INTRAVENOUS at 07:56

## 2023-07-18 RX ADMIN — FENTANYL CITRATE 50 MCG: 50 INJECTION, SOLUTION INTRAMUSCULAR; INTRAVENOUS at 08:00

## 2023-07-18 RX ADMIN — MIDAZOLAM 1 MG: 1 INJECTION INTRAMUSCULAR; INTRAVENOUS at 07:59
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
## 2023-09-10 ENCOUNTER — HEALTH MAINTENANCE LETTER (OUTPATIENT)
Age: 53
End: 2023-09-10

## 2023-09-17 DIAGNOSIS — M80.00XS OSTEOPOROSIS WITH CURRENT PATHOLOGICAL FRACTURE, UNSPECIFIED OSTEOPOROSIS TYPE, SEQUELA: ICD-10-CM

## 2023-09-17 DIAGNOSIS — S22.080A T12 COMPRESSION FRACTURE, INITIAL ENCOUNTER (H): ICD-10-CM

## 2023-09-19 NOTE — TELEPHONE ENCOUNTER
alendronate (FOSAMAX) 70 MG tablet   12 tablet 3 10/24/2022       Last Office Visit : 12-6-2021  Future Office visit:  none     Bisphosphonates Erfleq7009/17/2023 11:44 AM   Protocol Details Recent (12 mo) or future (30 days) visit within the authorizing pro       Scheduling has been notified to contact the pt for appointment.

## 2023-09-20 RX ORDER — ALENDRONATE SODIUM 70 MG/1
70 TABLET ORAL
Qty: 12 TABLET | Refills: 0 | Status: SHIPPED | OUTPATIENT
Start: 2023-09-20 | End: 2023-12-07

## 2023-10-02 ENCOUNTER — ANCILLARY PROCEDURE (OUTPATIENT)
Dept: BONE DENSITY | Facility: CLINIC | Age: 53
End: 2023-10-02
Attending: FAMILY MEDICINE
Payer: COMMERCIAL

## 2023-10-02 DIAGNOSIS — C50.911 INVASIVE DUCTAL CARCINOMA OF BREAST, RIGHT (H): ICD-10-CM

## 2023-10-02 DIAGNOSIS — M80.00XD AGE-RELATED OSTEOPOROSIS WITH CURRENT PATHOLOGICAL FRACTURE WITH ROUTINE HEALING, SUBSEQUENT ENCOUNTER: ICD-10-CM

## 2023-10-02 PROCEDURE — 77080 DXA BONE DENSITY AXIAL: CPT | Performed by: INTERNAL MEDICINE

## 2023-10-16 ENCOUNTER — TRANSFERRED RECORDS (OUTPATIENT)
Dept: HEALTH INFORMATION MANAGEMENT | Facility: CLINIC | Age: 53
End: 2023-10-16
Payer: COMMERCIAL

## 2023-10-16 ENCOUNTER — HOSPITAL ENCOUNTER (OUTPATIENT)
Dept: MAMMOGRAPHY | Facility: CLINIC | Age: 53
Discharge: HOME | End: 2023-10-16
Attending: INTERNAL MEDICINE | Admitting: INTERNAL MEDICINE
Payer: COMMERCIAL

## 2023-10-16 DIAGNOSIS — C50.811 MALIGNANT NEOPLASM OF OVERLAPPING SITES OF RIGHT FEMALE BREAST (H): ICD-10-CM

## 2023-10-16 PROCEDURE — 77067 SCR MAMMO BI INCL CAD: CPT

## 2023-10-17 ENCOUNTER — TRANSFERRED RECORDS (OUTPATIENT)
Dept: SURGERY | Facility: CLINIC | Age: 53
End: 2023-10-17
Payer: COMMERCIAL

## 2023-10-25 ENCOUNTER — HOSPITAL ENCOUNTER (OUTPATIENT)
Dept: ULTRASOUND IMAGING | Facility: CLINIC | Age: 53
Discharge: HOME | End: 2023-10-25
Attending: INTERNAL MEDICINE | Admitting: INTERNAL MEDICINE
Payer: COMMERCIAL

## 2023-10-25 DIAGNOSIS — C50.919 BREAST CANCER, FEMALE (H): ICD-10-CM

## 2023-10-25 PROCEDURE — 76882 US LMTD JT/FCL EVL NVASC XTR: CPT | Mod: RT

## 2023-10-30 ENCOUNTER — IMMUNIZATION (OUTPATIENT)
Dept: NURSING | Facility: CLINIC | Age: 53
End: 2023-10-30
Payer: COMMERCIAL

## 2023-10-30 PROCEDURE — 90471 IMMUNIZATION ADMIN: CPT

## 2023-10-30 PROCEDURE — 90480 ADMN SARSCOV2 VAC 1/ONLY CMP: CPT

## 2023-10-30 PROCEDURE — 91320 SARSCV2 VAC 30MCG TRS-SUC IM: CPT

## 2023-10-30 PROCEDURE — 90686 IIV4 VACC NO PRSV 0.5 ML IM: CPT

## 2023-11-06 ENCOUNTER — TELEPHONE (OUTPATIENT)
Dept: ENDOCRINOLOGY | Facility: CLINIC | Age: 53
End: 2023-11-06
Payer: COMMERCIAL

## 2023-11-06 NOTE — TELEPHONE ENCOUNTER
TALKED TO PT AND SCHEDULED NEW ENDOCRINE  VISIT WITH DR. SHAH FOR RX MAINTENANCE TOO   Elda Moses on 11/6/2023 at 1:01 PM

## 2023-12-06 DIAGNOSIS — M80.00XS OSTEOPOROSIS WITH CURRENT PATHOLOGICAL FRACTURE, UNSPECIFIED OSTEOPOROSIS TYPE, SEQUELA: ICD-10-CM

## 2023-12-06 DIAGNOSIS — S22.080A T12 COMPRESSION FRACTURE, INITIAL ENCOUNTER (H): ICD-10-CM

## 2023-12-11 NOTE — TELEPHONE ENCOUNTER
alendronate (FOSAMAX) 70 MG tablet 12 tablet 0 9/20/2023     Bisphosphonates Selqwa1912/07/2023 01:09 PM   Protocol Details Recent (12 mo) or future (30 days) visit within the authorizing provider's specialty     Last Office Visit: 12/6/21  Future Office visit:   4/2/24    Routing refill request to provider for review/approval because:  > 2 years since last visit    Mimi Navarro RN

## 2023-12-15 RX ORDER — ALENDRONATE SODIUM 70 MG/1
70 TABLET ORAL
Qty: 12 TABLET | Refills: 0 | Status: SHIPPED | OUTPATIENT
Start: 2023-12-15 | End: 2024-03-21

## 2024-01-10 ENCOUNTER — OFFICE VISIT (OUTPATIENT)
Dept: FAMILY MEDICINE | Facility: CLINIC | Age: 54
End: 2024-01-10
Payer: COMMERCIAL

## 2024-01-10 VITALS
TEMPERATURE: 97.7 F | OXYGEN SATURATION: 99 % | WEIGHT: 109 LBS | SYSTOLIC BLOOD PRESSURE: 109 MMHG | BODY MASS INDEX: 18.71 KG/M2 | DIASTOLIC BLOOD PRESSURE: 75 MMHG | HEART RATE: 78 BPM | RESPIRATION RATE: 14 BRPM

## 2024-01-10 DIAGNOSIS — M54.6 ACUTE MIDLINE THORACIC BACK PAIN: Primary | ICD-10-CM

## 2024-01-10 DIAGNOSIS — M80.00XD AGE-RELATED OSTEOPOROSIS WITH CURRENT PATHOLOGICAL FRACTURE WITH ROUTINE HEALING, SUBSEQUENT ENCOUNTER: ICD-10-CM

## 2024-01-10 DIAGNOSIS — C50.911 INVASIVE DUCTAL CARCINOMA OF BREAST, RIGHT (H): ICD-10-CM

## 2024-01-10 DIAGNOSIS — S22.080A T12 COMPRESSION FRACTURE, INITIAL ENCOUNTER (H): ICD-10-CM

## 2024-01-10 PROCEDURE — 99213 OFFICE O/P EST LOW 20 MIN: CPT | Performed by: INTERNAL MEDICINE

## 2024-01-10 ASSESSMENT — PATIENT HEALTH QUESTIONNAIRE - PHQ9
10. IF YOU CHECKED OFF ANY PROBLEMS, HOW DIFFICULT HAVE THESE PROBLEMS MADE IT FOR YOU TO DO YOUR WORK, TAKE CARE OF THINGS AT HOME, OR GET ALONG WITH OTHER PEOPLE: NOT DIFFICULT AT ALL
SUM OF ALL RESPONSES TO PHQ QUESTIONS 1-9: 0
SUM OF ALL RESPONSES TO PHQ QUESTIONS 1-9: 0

## 2024-01-10 ASSESSMENT — ANXIETY QUESTIONNAIRES
2. NOT BEING ABLE TO STOP OR CONTROL WORRYING: NOT AT ALL
4. TROUBLE RELAXING: NOT AT ALL
7. FEELING AFRAID AS IF SOMETHING AWFUL MIGHT HAPPEN: NOT AT ALL
5. BEING SO RESTLESS THAT IT IS HARD TO SIT STILL: NOT AT ALL
6. BECOMING EASILY ANNOYED OR IRRITABLE: NOT AT ALL
8. IF YOU CHECKED OFF ANY PROBLEMS, HOW DIFFICULT HAVE THESE MADE IT FOR YOU TO DO YOUR WORK, TAKE CARE OF THINGS AT HOME, OR GET ALONG WITH OTHER PEOPLE?: NOT DIFFICULT AT ALL
GAD7 TOTAL SCORE: 0
7. FEELING AFRAID AS IF SOMETHING AWFUL MIGHT HAPPEN: NOT AT ALL
1. FEELING NERVOUS, ANXIOUS, OR ON EDGE: NOT AT ALL
GAD7 TOTAL SCORE: 0
GAD7 TOTAL SCORE: 0
IF YOU CHECKED OFF ANY PROBLEMS ON THIS QUESTIONNAIRE, HOW DIFFICULT HAVE THESE PROBLEMS MADE IT FOR YOU TO DO YOUR WORK, TAKE CARE OF THINGS AT HOME, OR GET ALONG WITH OTHER PEOPLE: NOT DIFFICULT AT ALL
3. WORRYING TOO MUCH ABOUT DIFFERENT THINGS: NOT AT ALL

## 2024-01-10 ASSESSMENT — ASTHMA QUESTIONNAIRES
QUESTION_4 LAST FOUR WEEKS HOW OFTEN HAVE YOU USED YOUR RESCUE INHALER OR NEBULIZER MEDICATION (SUCH AS ALBUTEROL): NOT AT ALL
QUESTION_1 LAST FOUR WEEKS HOW MUCH OF THE TIME DID YOUR ASTHMA KEEP YOU FROM GETTING AS MUCH DONE AT WORK, SCHOOL OR AT HOME: NONE OF THE TIME
ACT_TOTALSCORE: 25
ACT_TOTALSCORE: 25
QUESTION_5 LAST FOUR WEEKS HOW WOULD YOU RATE YOUR ASTHMA CONTROL: COMPLETELY CONTROLLED
QUESTION_2 LAST FOUR WEEKS HOW OFTEN HAVE YOU HAD SHORTNESS OF BREATH: NOT AT ALL
QUESTION_3 LAST FOUR WEEKS HOW OFTEN DID YOUR ASTHMA SYMPTOMS (WHEEZING, COUGHING, SHORTNESS OF BREATH, CHEST TIGHTNESS OR PAIN) WAKE YOU UP AT NIGHT OR EARLIER THAN USUAL IN THE MORNING: NOT AT ALL

## 2024-01-10 ASSESSMENT — PAIN SCALES - GENERAL: PAINLEVEL: MILD PAIN (2)

## 2024-01-10 NOTE — PROGRESS NOTES
Assessment & Plan     Acute midline thoracic back pain  Back pain which is mild in intensity. Patient is worried this is either compression fracture - no hx of trauma OR she thinks her cancer has returned.   No point tenderness. No neuro deficits.   Will get an MRI   - MR Thoracic Spine w/o & w Contrast; Future    T12 compression fracture, initial encounter (H)- endplate fracture see on MR thoracic spine 10/2021   Age-related osteoporosis with current pathological fracture with routine healing, subsequent encounter  On fosamax.    Invasive ductal carcinoma of breast, right (H)  In remission.       See Patient Instructions    Vanessa Galindo MD  [facility] [place] Llanos is a 53 year old, presenting for the following health issues:  Back Problem    History of Present Illness       Back Pain:  She presents for follow up of back pain. Patient's back pain is a recurring problem.  Location of back pain:  Other  Description of back pain: dull ache  Back pain spreads: nowhere    Since patient first noticed back pain, pain is: always present, but gets better and worse  Does back pain interfere with her job:  No       She eats 2-3 servings of fruits and vegetables daily.She consumes 0 sweetened beverage(s) daily.She exercises with enough effort to increase her heart rate 9 or less minutes per day.  She exercises with enough effort to increase her heart rate 3 or less days per week.   She is taking medications regularly.     Viet is here for acute concerns. Her mid back pain started 1 week ago   Pain exaggerated by moving around - dull pain. Comes and goes.   She is not taking any pain medication for this. 2/10 in intensity.  No neurological deficits.      Review of Systems       Objective    /75 (BP Location: Right arm, Patient Position: Sitting, Cuff Size: Adult Regular)   Pulse 78   Temp 97.7  F (36.5  C) (Oral)   Resp 14   Wt 49.4 kg (109 lb)   LMP 05/08/2018 (Exact Date)   SpO2 99%    BMI 18.71 kg/m    Body mass index is 18.71 kg/m .  Physical Exam

## 2024-01-20 ENCOUNTER — HOSPITAL ENCOUNTER (OUTPATIENT)
Dept: MRI IMAGING | Facility: CLINIC | Age: 54
Discharge: HOME | End: 2024-01-20
Attending: INTERNAL MEDICINE | Admitting: INTERNAL MEDICINE
Payer: COMMERCIAL

## 2024-01-20 DIAGNOSIS — E27.9 ADRENAL NODULE (H): Primary | ICD-10-CM

## 2024-01-20 DIAGNOSIS — M54.6 ACUTE MIDLINE THORACIC BACK PAIN: ICD-10-CM

## 2024-01-20 PROCEDURE — 72157 MRI CHEST SPINE W/O & W/DYE: CPT

## 2024-01-20 PROCEDURE — 255N000002 HC RX 255 OP 636: Performed by: INTERNAL MEDICINE

## 2024-01-20 PROCEDURE — A9585 GADOBUTROL INJECTION: HCPCS | Performed by: INTERNAL MEDICINE

## 2024-01-20 RX ORDER — GADOBUTROL 604.72 MG/ML
5 INJECTION INTRAVENOUS ONCE
Status: COMPLETED | OUTPATIENT
Start: 2024-01-20 | End: 2024-01-20

## 2024-01-20 RX ADMIN — GADOBUTROL 5 ML: 604.72 INJECTION INTRAVENOUS at 11:55

## 2024-02-13 ENCOUNTER — TELEPHONE (OUTPATIENT)
Dept: FAMILY MEDICINE | Facility: CLINIC | Age: 54
End: 2024-02-13
Payer: COMMERCIAL

## 2024-02-14 NOTE — TELEPHONE ENCOUNTER
Sent pt a [employer] message- I've ordered an MRI of your adrenal gland/kidneys based on the adrenal nodule seen on your MRI of the thoracic spine.  One of the radiology schedulers will call you to schedule this.     Please check to ensure that pt sees this message.

## 2024-02-14 NOTE — TELEPHONE ENCOUNTER
----- Message from Vanessa Galindo MD sent at 1/23/2024  5:29 PM CST -----  Hello Viet    MRI shows old compression fractures in the spine. This is not new.  There is an incidental nodule found in left adrenal gland, which sits on top of your left kidney. Please follow up with your primary care doctor to discuss management of this nodule.    Dr Vanessa Galindo  [facility]

## 2024-02-29 ENCOUNTER — HOSPITAL ENCOUNTER (OUTPATIENT)
Dept: MRI IMAGING | Facility: CLINIC | Age: 54
Discharge: HOME | End: 2024-02-29
Attending: FAMILY MEDICINE | Admitting: FAMILY MEDICINE
Payer: COMMERCIAL

## 2024-02-29 DIAGNOSIS — M80.00XS OSTEOPOROSIS WITH CURRENT PATHOLOGICAL FRACTURE, UNSPECIFIED OSTEOPOROSIS TYPE, SEQUELA: ICD-10-CM

## 2024-02-29 DIAGNOSIS — S22.080A T12 COMPRESSION FRACTURE, INITIAL ENCOUNTER (H): ICD-10-CM

## 2024-02-29 DIAGNOSIS — E27.9 ADRENAL NODULE (H): ICD-10-CM

## 2024-02-29 PROCEDURE — 74183 MRI ABD W/O CNTR FLWD CNTR: CPT

## 2024-02-29 PROCEDURE — 255N000002 HC RX 255 OP 636: Performed by: FAMILY MEDICINE

## 2024-02-29 PROCEDURE — A9585 GADOBUTROL INJECTION: HCPCS | Performed by: FAMILY MEDICINE

## 2024-02-29 RX ORDER — GADOBUTROL 604.72 MG/ML
5 INJECTION INTRAVENOUS ONCE
Status: COMPLETED | OUTPATIENT
Start: 2024-02-29 | End: 2024-02-29

## 2024-02-29 RX ADMIN — GADOBUTROL 5 ML: 604.72 INJECTION INTRAVENOUS at 12:42

## 2024-03-06 DIAGNOSIS — S22.080A T12 COMPRESSION FRACTURE, INITIAL ENCOUNTER (H): ICD-10-CM

## 2024-03-06 DIAGNOSIS — M80.00XS OSTEOPOROSIS WITH CURRENT PATHOLOGICAL FRACTURE, UNSPECIFIED OSTEOPOROSIS TYPE, SEQUELA: ICD-10-CM

## 2024-03-06 RX ORDER — ALENDRONATE SODIUM 70 MG/1
70 TABLET ORAL
Qty: 5 TABLET | Refills: 0 | OUTPATIENT
Start: 2024-03-06

## 2024-03-06 RX ORDER — ALENDRONATE SODIUM 70 MG/1
70 TABLET ORAL
Qty: 5 TABLET | OUTPATIENT
Start: 2024-03-06

## 2024-03-06 NOTE — TELEPHONE ENCOUNTER
alendronate (FOSAMAX) 70 MG tablet   12 tablet 0 12/15/2023       Last Office Visit : 12-6-2021  Future Office visit:  4-2-2024      Bisphosphonates Hybfnd0002/29/2024 06:39 AM   Protocol Details Normal Creatinine Clearance  within past 12 months        Creatinine clearance  77.363

## 2024-03-07 NOTE — TELEPHONE ENCOUNTER
Alendronate denied she needs to see new provider before it will be ordered .Ebony Taveras RN on 3/6/2024 at 6:58 PM

## 2024-03-17 DIAGNOSIS — S22.080A T12 COMPRESSION FRACTURE, INITIAL ENCOUNTER (H): ICD-10-CM

## 2024-03-17 DIAGNOSIS — M80.00XS OSTEOPOROSIS WITH CURRENT PATHOLOGICAL FRACTURE, UNSPECIFIED OSTEOPOROSIS TYPE, SEQUELA: ICD-10-CM

## 2024-03-18 ENCOUNTER — TELEPHONE (OUTPATIENT)
Dept: FAMILY MEDICINE | Facility: CLINIC | Age: 54
End: 2024-03-18
Payer: COMMERCIAL

## 2024-03-19 SDOH — HEALTH STABILITY: PHYSICAL HEALTH: ON AVERAGE, HOW MANY MINUTES DO YOU ENGAGE IN EXERCISE AT THIS LEVEL?: 30 MIN

## 2024-03-19 SDOH — HEALTH STABILITY: PHYSICAL HEALTH: ON AVERAGE, HOW MANY DAYS PER WEEK DO YOU ENGAGE IN MODERATE TO STRENUOUS EXERCISE (LIKE A BRISK WALK)?: 3 DAYS

## 2024-03-19 ASSESSMENT — SOCIAL DETERMINANTS OF HEALTH (SDOH): HOW OFTEN DO YOU GET TOGETHER WITH FRIENDS OR RELATIVES?: ONCE A WEEK

## 2024-03-21 ENCOUNTER — OFFICE VISIT (OUTPATIENT)
Dept: FAMILY MEDICINE | Facility: CLINIC | Age: 54
End: 2024-03-21
Payer: COMMERCIAL

## 2024-03-21 VITALS
WEIGHT: 109 LBS | BODY MASS INDEX: 18.61 KG/M2 | DIASTOLIC BLOOD PRESSURE: 70 MMHG | OXYGEN SATURATION: 99 % | TEMPERATURE: 97 F | RESPIRATION RATE: 16 BRPM | HEART RATE: 77 BPM | SYSTOLIC BLOOD PRESSURE: 110 MMHG | HEIGHT: 64 IN

## 2024-03-21 DIAGNOSIS — Z13.1 SCREENING FOR DIABETES MELLITUS: ICD-10-CM

## 2024-03-21 DIAGNOSIS — Z23 NEED FOR PNEUMOCOCCAL 20-VALENT CONJUGATE VACCINATION: ICD-10-CM

## 2024-03-21 DIAGNOSIS — M80.00XS OSTEOPOROSIS WITH CURRENT PATHOLOGICAL FRACTURE, UNSPECIFIED OSTEOPOROSIS TYPE, SEQUELA: ICD-10-CM

## 2024-03-21 DIAGNOSIS — Z00.00 ROUTINE GENERAL MEDICAL EXAMINATION AT A HEALTH CARE FACILITY: Primary | ICD-10-CM

## 2024-03-21 DIAGNOSIS — E78.5 HYPERLIPIDEMIA LDL GOAL <130: ICD-10-CM

## 2024-03-21 DIAGNOSIS — Z13.0 SCREENING FOR DEFICIENCY ANEMIA: ICD-10-CM

## 2024-03-21 DIAGNOSIS — E55.9 VITAMIN D DEFICIENCY: ICD-10-CM

## 2024-03-21 DIAGNOSIS — S22.080A T12 COMPRESSION FRACTURE, INITIAL ENCOUNTER (H): ICD-10-CM

## 2024-03-21 DIAGNOSIS — E78.00 ELEVATED LDL CHOLESTEROL LEVEL: ICD-10-CM

## 2024-03-21 LAB
ERYTHROCYTE [DISTWIDTH] IN BLOOD BY AUTOMATED COUNT: 12.5 % (ref 10–15)
HCT VFR BLD AUTO: 39.7 % (ref 35–47)
HGB BLD-MCNC: 13.8 G/DL (ref 11.7–15.7)
MCH RBC QN AUTO: 31.2 PG (ref 26.5–33)
MCHC RBC AUTO-ENTMCNC: 34.8 G/DL (ref 31.5–36.5)
MCV RBC AUTO: 90 FL (ref 78–100)
PLATELET # BLD AUTO: 197 10E3/UL (ref 150–450)
RBC # BLD AUTO: 4.42 10E6/UL (ref 3.8–5.2)
WBC # BLD AUTO: 4.4 10E3/UL (ref 4–11)

## 2024-03-21 PROCEDURE — 80061 LIPID PANEL: CPT | Performed by: FAMILY MEDICINE

## 2024-03-21 PROCEDURE — 82306 VITAMIN D 25 HYDROXY: CPT | Performed by: FAMILY MEDICINE

## 2024-03-21 PROCEDURE — 90677 PCV20 VACCINE IM: CPT | Performed by: FAMILY MEDICINE

## 2024-03-21 PROCEDURE — 90471 IMMUNIZATION ADMIN: CPT | Performed by: FAMILY MEDICINE

## 2024-03-21 PROCEDURE — 99396 PREV VISIT EST AGE 40-64: CPT | Mod: 25 | Performed by: FAMILY MEDICINE

## 2024-03-21 PROCEDURE — 36415 COLL VENOUS BLD VENIPUNCTURE: CPT | Performed by: FAMILY MEDICINE

## 2024-03-21 PROCEDURE — 80053 COMPREHEN METABOLIC PANEL: CPT | Performed by: FAMILY MEDICINE

## 2024-03-21 PROCEDURE — 85027 COMPLETE CBC AUTOMATED: CPT | Performed by: FAMILY MEDICINE

## 2024-03-21 RX ORDER — ALENDRONATE SODIUM 70 MG/1
70 TABLET ORAL
Qty: 12 TABLET | Refills: 0 | Status: SHIPPED | OUTPATIENT
Start: 2024-03-21 | End: 2024-04-02

## 2024-03-21 RX ORDER — ALENDRONATE SODIUM 70 MG/1
TABLET ORAL
Qty: 12 TABLET | Refills: 0 | OUTPATIENT
Start: 2024-03-21

## 2024-03-21 NOTE — CONFIDENTIAL NOTE
RECORDS RECEIVED FROM: internal    DATE RECEIVED: 4.2.24    NOTES (FOR ALL VISITS) STATUS DETAILS   OFFICE NOTES from referring provider internal  Self referred    MEDICATION LIST internal     IMAGING      DEXASCAN internal  10.2.23    MRI (BRAIN) internal  Throasic spine- 1.20.24   LABS     DIABETES: HBGA1C, CREATININE, FASTING LIPIDS, MICROALBUMIN URINE, POTASSIUM, TSH, T4    THYROID: TSH, T4, CBC, THYRODLONULIN, TOTAL T3, FREE T4, CALCITONIN, CEA internal  Cbc- 3.21.24  Hydro vitamin D- 2.2.23  Lipid- 2.2.23  Cmp- 2.2.23

## 2024-03-21 NOTE — PATIENT INSTRUCTIONS
Preventive Care Advice   This is general advice given by our system to help you stay healthy. However, your care team may have specific advice just for you. Please talk to your care team about your preventive care needs.  Nutrition  Eat 5 or more servings of fruits and vegetables each day.  Try wheat bread, brown rice and whole grain pasta (instead of white bread, rice, and pasta).  Get enough calcium and vitamin D. Check the label on foods and aim for 100% of the RDA (recommended daily allowance).  Lifestyle  Exercise at least 150 minutes each week   (30 minutes a day, 5 days a week).  Do muscle strengthening activities 2 days a week. These help control your weight and prevent disease.  No smoking.  Wear sunscreen to prevent skin cancer.  Have a dental exam and cleaning every 6 months.  Yearly exams  See your health care team every year to talk about:  Any changes in your health.  Any medicines your care team has prescribed.  Preventive care, family planning, and ways to prevent chronic diseases.  Shots (vaccines)   HPV shots (up to age 26), if you've never had them before.  Hepatitis B shots (up to age 59), if you've never had them before.  COVID-19 shot: Get this shot when it's due.  Flu shot: Get a flu shot every year.  Tetanus shot: Get a tetanus shot every 10 years.  Pneumococcal, hepatitis A, and RSV shots: Ask your care team if you need these based on your risk.  Shingles shot (for age 50 and up).  General health tests  Diabetes screening:  Starting at age 35, Get screened for diabetes at least every 3 years.  If you are younger than age 35, ask your care team if you should be screened for diabetes.  Cholesterol test: At age 39, start having a cholesterol test every 5 years, or more often if advised.  Bone density scan (DEXA): At age 50, ask your care team if you should have this scan for osteoporosis (brittle bones).  Hepatitis C: Get tested at least once in your life.  STIs (sexually transmitted  infections)  Before age 24: Ask your care team if you should be screened for STIs.  After age 24: Get screened for STIs if you're at risk. You are at risk for STIs (including HIV) if:  You are sexually active with more than one person.  You don't use condoms every time.  You or a partner was diagnosed with a sexually transmitted infection.  If you are at risk for HIV, ask about PrEP medicine to prevent HIV.  Get tested for HIV at least once in your life, whether you are at risk for HIV or not.  Cancer screening tests  Cervical cancer screening: If you have a cervix, begin getting regular cervical cancer screening tests at age 21. Most people who have regular screenings with normal results can stop after age 65. Talk about this with your provider.  Breast cancer scan (mammogram): If you've ever had breasts, begin having regular mammograms starting at age 40. This is a scan to check for breast cancer.  Colon cancer screening: It is important to start screening for colon cancer at age 45.  Have a colonoscopy test every 10 years (or more often if you're at risk) Or, ask your provider about stool tests like a FIT test every year or Cologuard test every 3 years.  To learn more about your testing options, visit: https://www.[employer]/382166.pdf.  For help making a decision, visit: https://bit.ly/nj02396.  Prostate cancer screening test: If you have a prostate and are age 55 to 69, ask your provider if you would benefit from a yearly prostate cancer screening test.  Lung cancer screening: If you are a current or former smoker age 50 to 80, ask your care team if ongoing lung cancer screenings are right for you.  For informational purposes only. Not to replace the advice of your health care provider. Copyright   2023 [employer]. All rights reserved. Clinically reviewed by the [facility] Transitions Program. [employer] [id number] - REV 01/24.
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
## 2024-03-22 LAB
ALBUMIN SERPL BCG-MCNC: 4.6 G/DL (ref 3.5–5.2)
ALP SERPL-CCNC: 38 U/L (ref 40–150)
ALT SERPL W P-5'-P-CCNC: 25 U/L (ref 0–50)
ANION GAP SERPL CALCULATED.3IONS-SCNC: 11 MMOL/L (ref 7–15)
AST SERPL W P-5'-P-CCNC: 26 U/L (ref 0–45)
BILIRUB SERPL-MCNC: 0.4 MG/DL
BUN SERPL-MCNC: 7.3 MG/DL (ref 6–20)
CALCIUM SERPL-MCNC: 9.8 MG/DL (ref 8.6–10)
CHLORIDE SERPL-SCNC: 103 MMOL/L (ref 98–107)
CHOLEST SERPL-MCNC: 206 MG/DL
CREAT SERPL-MCNC: 0.66 MG/DL (ref 0.51–0.95)
DEPRECATED HCO3 PLAS-SCNC: 26 MMOL/L (ref 22–29)
EGFRCR SERPLBLD CKD-EPI 2021: >90 ML/MIN/1.73M2
FASTING STATUS PATIENT QL REPORTED: YES
GLUCOSE SERPL-MCNC: 87 MG/DL (ref 70–99)
HDLC SERPL-MCNC: 102 MG/DL
LDLC SERPL CALC-MCNC: 89 MG/DL
NONHDLC SERPL-MCNC: 104 MG/DL
POTASSIUM SERPL-SCNC: 4.5 MMOL/L (ref 3.4–5.3)
PROT SERPL-MCNC: 7 G/DL (ref 6.4–8.3)
SODIUM SERPL-SCNC: 140 MMOL/L (ref 135–145)
TRIGL SERPL-MCNC: 77 MG/DL

## 2024-04-02 ENCOUNTER — VIRTUAL VISIT (OUTPATIENT)
Dept: ENDOCRINOLOGY | Facility: CLINIC | Age: 54
End: 2024-04-02
Payer: COMMERCIAL

## 2024-04-02 ENCOUNTER — PRE VISIT (OUTPATIENT)
Dept: ENDOCRINOLOGY | Facility: CLINIC | Age: 54
End: 2024-04-02

## 2024-04-02 VITALS — WEIGHT: 108 LBS | BODY MASS INDEX: 18.54 KG/M2

## 2024-04-02 DIAGNOSIS — M80.00XS OSTEOPOROSIS WITH CURRENT PATHOLOGICAL FRACTURE, UNSPECIFIED OSTEOPOROSIS TYPE, SEQUELA: ICD-10-CM

## 2024-04-02 DIAGNOSIS — S22.080A T12 COMPRESSION FRACTURE, INITIAL ENCOUNTER (H): ICD-10-CM

## 2024-04-02 PROCEDURE — 99215 OFFICE O/P EST HI 40 MIN: CPT | Mod: 95 | Performed by: STUDENT IN AN ORGANIZED HEALTH CARE EDUCATION/TRAINING PROGRAM

## 2024-04-02 RX ORDER — ALENDRONATE SODIUM 70 MG/1
70 TABLET ORAL
Qty: 12 TABLET | Refills: 0 | Status: SHIPPED | OUTPATIENT
Start: 2024-04-02 | End: 2024-09-18
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
## 2024-04-02 NOTE — PATIENT INSTRUCTIONS
CALCIUM Recommendation 8027-8299 mg/day in divided dose of no more than 500 mg/dose. This includes what is in your food and also what is in any supplements you are taking.      Dietary sources of calcium: These also contain vitamin D  Milk / buttermilk              8 oz            300 mg  Dry milk powder       5 Tbsp             300 mg  Yogurt                          1 cup           400 mg  Ice cream   1/2 cup          100 mg  Hard cheese                     1.5 oz          300 mg  Cottage cheese                1/2 cup        65 mg  Spinach, cooked  1 cup  240 mg  Tofu, soft regular           4 oz          120 to 390 mg  Sardines                       3 oz               370 mg  Mackerel canned         3 oz                250 mg  Canned salmon with bones 3 oz        170-210 mg  Calcium fortified cereals are available  SILK soy and almond milk products are calcium fortified  Orange juice  Fortified with Calcium       8 oz            300 mg  Low fat dairy sources are recommended      Recommended exercise goals:    30 minutes of moderate exercise on most days of the week .  Weight bearing exercise (ie, walking, jogging, hiking, dancing)    Strength training 2 or more times/week in addition to other weight -being exercise.  Strength training -- uses weight or resistance beyond that seen in everyday activities -(pilates, weight training with free weights, weight machines or resistance bands)    OSTEOPOROSIS of the spine: avoid exercise machines that incorporate spinal flexion, trunk rotation, or forward bending   Specifically avoid abdominal exercisers, stationary bikes with moving handles, cross-country ski machines, rowing machines, and bicep curl machines  Spinal fractures: AVOID activities that place significant force on the spine such as horse back riding,  tennis, golf or bowling    Bone density DXA may be performed every 2 years.  It must be performed on the same machine for comparison.
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
## 2024-04-02 NOTE — NURSING NOTE
Is the patient currently in the state of [place]? YES    Visit mode:VIDEO    If the visit is dropped, the patient can be reconnected by: VIDEO VISIT: Text to cell phone:   Telephone Information:   Mobile [phone]       Will anyone else be joining the visit? NO  (If patient encounters technical issues they should call [phone] :[phone])    How would you like to obtain your AVS? MyChart    Are changes needed to the allergy or medication list? No    Are refills needed on medications prescribed by this physician?     Reason for visit: Consult and Video Visit    Lolita DRAKE

## 2024-07-25 ENCOUNTER — HOSPITAL ENCOUNTER (OUTPATIENT)
Dept: MRI IMAGING | Facility: CLINIC | Age: 54
Discharge: HOME | End: 2024-07-25
Attending: INTERNAL MEDICINE | Admitting: INTERNAL MEDICINE
Payer: COMMERCIAL

## 2024-07-25 DIAGNOSIS — R92.30 HETEROGENEOUSLY DENSE TISSUE ON MAMMOGRAPHY: ICD-10-CM

## 2024-07-25 DIAGNOSIS — C50.811 MALIGNANT NEOPLASM OF OVERLAPPING SITES OF RIGHT FEMALE BREAST (H): ICD-10-CM

## 2024-07-25 PROCEDURE — 77049 MRI BREAST C-+ W/CAD BI: CPT

## 2024-07-25 PROCEDURE — 255N000002 HC RX 255 OP 636: Performed by: INTERNAL MEDICINE

## 2024-07-25 PROCEDURE — A9585 GADOBUTROL INJECTION: HCPCS | Performed by: INTERNAL MEDICINE

## 2024-07-25 RX ORDER — GADOBUTROL 604.72 MG/ML
5 INJECTION INTRAVENOUS ONCE
Status: COMPLETED | OUTPATIENT
Start: 2024-07-25 | End: 2024-07-25

## 2024-07-25 RX ADMIN — GADOBUTROL 5 ML: 604.72 INJECTION INTRAVENOUS at 09:43

## 2024-09-03 ENCOUNTER — TRANSFERRED RECORDS (OUTPATIENT)
Dept: HEALTH INFORMATION MANAGEMENT | Facility: CLINIC | Age: 54
End: 2024-09-03
Payer: COMMERCIAL

## 2024-09-03 ENCOUNTER — TRANSFERRED RECORDS (OUTPATIENT)
Dept: SURGERY | Facility: CLINIC | Age: 54
End: 2024-09-03
Payer: COMMERCIAL

## 2024-09-14 DIAGNOSIS — S22.080A T12 COMPRESSION FRACTURE, INITIAL ENCOUNTER (H): ICD-10-CM

## 2024-09-14 DIAGNOSIS — M80.00XS OSTEOPOROSIS WITH CURRENT PATHOLOGICAL FRACTURE, UNSPECIFIED OSTEOPOROSIS TYPE, SEQUELA: ICD-10-CM

## 2024-09-16 ENCOUNTER — PATIENT OUTREACH (OUTPATIENT)
Dept: CARE COORDINATION | Facility: CLINIC | Age: 54
End: 2024-09-16
Payer: COMMERCIAL

## 2024-09-19 RX ORDER — ALENDRONATE SODIUM 70 MG/1
70 TABLET ORAL
Qty: 12 TABLET | Refills: 2 | Status: SHIPPED | OUTPATIENT
Start: 2024-09-19

## 2024-09-20 NOTE — TELEPHONE ENCOUNTER
"LVD:  4/2/2024  [facility]  Marilia Sharma MD  Endocrinology, Diabetes, and Metabolism  \"continue fosamax for 2 more years.  - next dexa scan 10/2025.\"  Refilled per protocol.    "

## 2024-10-14 ENCOUNTER — PATIENT OUTREACH (OUTPATIENT)
Dept: CARE COORDINATION | Facility: CLINIC | Age: 54
End: 2024-10-14
Payer: COMMERCIAL

## 2024-12-29 ENCOUNTER — HEALTH MAINTENANCE LETTER (OUTPATIENT)
Age: 54
End: 2024-12-29

## 2025-03-14 ENCOUNTER — TRANSFERRED RECORDS (OUTPATIENT)
Dept: HEALTH INFORMATION MANAGEMENT | Facility: CLINIC | Age: 55
End: 2025-03-14
Payer: COMMERCIAL

## 2025-03-21 SDOH — HEALTH STABILITY: PHYSICAL HEALTH: ON AVERAGE, HOW MANY DAYS PER WEEK DO YOU ENGAGE IN MODERATE TO STRENUOUS EXERCISE (LIKE A BRISK WALK)?: 3 DAYS

## 2025-03-21 SDOH — HEALTH STABILITY: PHYSICAL HEALTH: ON AVERAGE, HOW MANY MINUTES DO YOU ENGAGE IN EXERCISE AT THIS LEVEL?: 30 MIN

## 2025-03-21 ASSESSMENT — ASTHMA QUESTIONNAIRES
QUESTION_1 LAST FOUR WEEKS HOW MUCH OF THE TIME DID YOUR ASTHMA KEEP YOU FROM GETTING AS MUCH DONE AT WORK, SCHOOL OR AT HOME: NONE OF THE TIME
QUESTION_5 LAST FOUR WEEKS HOW WOULD YOU RATE YOUR ASTHMA CONTROL: COMPLETELY CONTROLLED
QUESTION_4 LAST FOUR WEEKS HOW OFTEN HAVE YOU USED YOUR RESCUE INHALER OR NEBULIZER MEDICATION (SUCH AS ALBUTEROL): NOT AT ALL
ACT_TOTALSCORE: 25
QUESTION_3 LAST FOUR WEEKS HOW OFTEN DID YOUR ASTHMA SYMPTOMS (WHEEZING, COUGHING, SHORTNESS OF BREATH, CHEST TIGHTNESS OR PAIN) WAKE YOU UP AT NIGHT OR EARLIER THAN USUAL IN THE MORNING: NOT AT ALL
QUESTION_2 LAST FOUR WEEKS HOW OFTEN HAVE YOU HAD SHORTNESS OF BREATH: NOT AT ALL

## 2025-03-21 ASSESSMENT — SOCIAL DETERMINANTS OF HEALTH (SDOH): HOW OFTEN DO YOU GET TOGETHER WITH FRIENDS OR RELATIVES?: ONCE A WEEK

## 2025-03-26 ENCOUNTER — OFFICE VISIT (OUTPATIENT)
Dept: FAMILY MEDICINE | Facility: CLINIC | Age: 55
End: 2025-03-26
Payer: COMMERCIAL

## 2025-03-26 VITALS
BODY MASS INDEX: 18.71 KG/M2 | WEIGHT: 109.6 LBS | OXYGEN SATURATION: 98 % | RESPIRATION RATE: 16 BRPM | HEART RATE: 82 BPM | SYSTOLIC BLOOD PRESSURE: 106 MMHG | DIASTOLIC BLOOD PRESSURE: 64 MMHG | HEIGHT: 64 IN | TEMPERATURE: 96.4 F

## 2025-03-26 DIAGNOSIS — E78.5 HYPERLIPIDEMIA LDL GOAL <130: ICD-10-CM

## 2025-03-26 DIAGNOSIS — L23.4 ALLERGIC CONTACT DERMATITIS DUE TO DYES: ICD-10-CM

## 2025-03-26 DIAGNOSIS — C50.911 INVASIVE DUCTAL CARCINOMA OF BREAST, RIGHT (H): ICD-10-CM

## 2025-03-26 DIAGNOSIS — K21.9 GASTROESOPHAGEAL REFLUX DISEASE WITHOUT ESOPHAGITIS: ICD-10-CM

## 2025-03-26 DIAGNOSIS — J45.20 MILD INTERMITTENT ASTHMA WITHOUT COMPLICATION: ICD-10-CM

## 2025-03-26 DIAGNOSIS — S22.080A T12 COMPRESSION FRACTURE, INITIAL ENCOUNTER (H): ICD-10-CM

## 2025-03-26 DIAGNOSIS — Z90.710 S/P LAPAROSCOPIC ASSISTED VAGINAL HYSTERECTOMY (LAVH): ICD-10-CM

## 2025-03-26 DIAGNOSIS — H34.8110 CENTRAL RETINAL VEIN OCCLUSION WITH MACULAR EDEMA OF RIGHT EYE (H): ICD-10-CM

## 2025-03-26 DIAGNOSIS — F41.1 ANXIETY STATE: ICD-10-CM

## 2025-03-26 DIAGNOSIS — Z00.00 ROUTINE GENERAL MEDICAL EXAMINATION AT A HEALTH CARE FACILITY: Primary | ICD-10-CM

## 2025-03-26 DIAGNOSIS — R11.2 NAUSEA AND VOMITING, UNSPECIFIED VOMITING TYPE: ICD-10-CM

## 2025-03-26 DIAGNOSIS — M80.00XS OSTEOPOROSIS WITH CURRENT PATHOLOGICAL FRACTURE, UNSPECIFIED OSTEOPOROSIS TYPE, SEQUELA: ICD-10-CM

## 2025-03-26 LAB
ALBUMIN SERPL BCG-MCNC: 4.8 G/DL (ref 3.5–5.2)
ALP SERPL-CCNC: 44 U/L (ref 40–150)
ALT SERPL W P-5'-P-CCNC: 27 U/L (ref 0–50)
ANION GAP SERPL CALCULATED.3IONS-SCNC: 11 MMOL/L (ref 7–15)
AST SERPL W P-5'-P-CCNC: 29 U/L (ref 0–45)
BILIRUB SERPL-MCNC: 0.4 MG/DL
BUN SERPL-MCNC: 10 MG/DL (ref 6–20)
CALCIUM SERPL-MCNC: 10.4 MG/DL (ref 8.8–10.4)
CHLORIDE SERPL-SCNC: 102 MMOL/L (ref 98–107)
CHOLEST SERPL-MCNC: 227 MG/DL
CREAT SERPL-MCNC: 0.66 MG/DL (ref 0.51–0.95)
EGFRCR SERPLBLD CKD-EPI 2021: >90 ML/MIN/1.73M2
ERYTHROCYTE [DISTWIDTH] IN BLOOD BY AUTOMATED COUNT: 12.4 % (ref 10–15)
FASTING STATUS PATIENT QL REPORTED: YES
FASTING STATUS PATIENT QL REPORTED: YES
GLUCOSE SERPL-MCNC: 93 MG/DL (ref 70–99)
HCO3 SERPL-SCNC: 28 MMOL/L (ref 22–29)
HCT VFR BLD AUTO: 43.1 % (ref 35–47)
HDLC SERPL-MCNC: 109 MG/DL
HGB BLD-MCNC: 14.9 G/DL (ref 11.7–15.7)
LDLC SERPL CALC-MCNC: 100 MG/DL
MCH RBC QN AUTO: 30.8 PG (ref 26.5–33)
MCHC RBC AUTO-ENTMCNC: 34.6 G/DL (ref 31.5–36.5)
MCV RBC AUTO: 89 FL (ref 78–100)
NONHDLC SERPL-MCNC: 118 MG/DL
PLATELET # BLD AUTO: 214 10E3/UL (ref 150–450)
POTASSIUM SERPL-SCNC: 4.7 MMOL/L (ref 3.4–5.3)
PROT SERPL-MCNC: 7.6 G/DL (ref 6.4–8.3)
RBC # BLD AUTO: 4.83 10E6/UL (ref 3.8–5.2)
SODIUM SERPL-SCNC: 141 MMOL/L (ref 135–145)
TRIGL SERPL-MCNC: 89 MG/DL
TSH SERPL DL<=0.005 MIU/L-ACNC: 2.13 UIU/ML (ref 0.3–4.2)
WBC # BLD AUTO: 4.8 10E3/UL (ref 4–11)

## 2025-03-26 RX ORDER — ALBUTEROL SULFATE 90 UG/1
2 INHALANT RESPIRATORY (INHALATION) EVERY 4 HOURS PRN
Qty: 18 G | Refills: 11 | Status: SHIPPED | OUTPATIENT
Start: 2025-03-26

## 2025-03-26 RX ORDER — VENLAFAXINE HYDROCHLORIDE 37.5 MG/1
CAPSULE, EXTENDED RELEASE ORAL
Qty: 90 CAPSULE | Refills: 3 | Status: SHIPPED | OUTPATIENT
Start: 2025-03-26

## 2025-03-26 RX ORDER — ALENDRONATE SODIUM 70 MG/1
70 TABLET ORAL
Qty: 12 TABLET | Refills: 3 | Status: SHIPPED | OUTPATIENT
Start: 2025-03-26

## 2025-03-26 RX ORDER — FLUOCINONIDE 0.5 MG/G
OINTMENT TOPICAL
Qty: 15 G | Refills: 3 | Status: SHIPPED | OUTPATIENT
Start: 2025-03-26

## 2025-03-26 ASSESSMENT — ANXIETY QUESTIONNAIRES
2. NOT BEING ABLE TO STOP OR CONTROL WORRYING: NOT AT ALL
6. BECOMING EASILY ANNOYED OR IRRITABLE: NOT AT ALL
GAD7 TOTAL SCORE: 0
3. WORRYING TOO MUCH ABOUT DIFFERENT THINGS: NOT AT ALL
5. BEING SO RESTLESS THAT IT IS HARD TO SIT STILL: NOT AT ALL
GAD7 TOTAL SCORE: 0
7. FEELING AFRAID AS IF SOMETHING AWFUL MIGHT HAPPEN: NOT AT ALL
IF YOU CHECKED OFF ANY PROBLEMS ON THIS QUESTIONNAIRE, HOW DIFFICULT HAVE THESE PROBLEMS MADE IT FOR YOU TO DO YOUR WORK, TAKE CARE OF THINGS AT HOME, OR GET ALONG WITH OTHER PEOPLE: NOT DIFFICULT AT ALL
7. FEELING AFRAID AS IF SOMETHING AWFUL MIGHT HAPPEN: NOT AT ALL
4. TROUBLE RELAXING: NOT AT ALL
8. IF YOU CHECKED OFF ANY PROBLEMS, HOW DIFFICULT HAVE THESE MADE IT FOR YOU TO DO YOUR WORK, TAKE CARE OF THINGS AT HOME, OR GET ALONG WITH OTHER PEOPLE?: NOT DIFFICULT AT ALL
GAD7 TOTAL SCORE: 0
1. FEELING NERVOUS, ANXIOUS, OR ON EDGE: NOT AT ALL

## 2025-03-26 ASSESSMENT — PATIENT HEALTH QUESTIONNAIRE - PHQ9
SUM OF ALL RESPONSES TO PHQ QUESTIONS 1-9: 1
SUM OF ALL RESPONSES TO PHQ QUESTIONS 1-9: 1

## 2025-03-26 NOTE — LETTER
My Asthma Action Plan    Name: Jono Templeton   YOB: 1970  Date: 3/26/2025   My doctor: Giselle Mcdonnell MD   My clinic: [facility] [place]        My Rescue Medicine: Albuterol (Proair/Ventolin/Proventil HFA) 2-4 puffs EVERY 4 HOURS as needed. Use a spacer if recommended by your provider.   My Asthma Severity:   Intermittent / Exercise Induced  Know your asthma triggers:   cold air          GREEN ZONE   Good Control  I feel good  No cough or wheeze  Can work, sleep and play without asthma symptoms       Take your asthma control medicine every day.     If exercise triggers your asthma, take your rescue medication  15 minutes before exercise or sports, and  During exercise if you have asthma symptoms  Spacer to use with inhaler: If you have a spacer, make sure to use it with your inhaler             YELLOW ZONE Getting Worse  I have ANY of these:  I do not feel good  Cough or wheeze  Chest feels tight  Wake up at night   Keep taking your Green Zone medications  Start taking your rescue medicine:  every 20 minutes for up to 1 hour. Then every 4 hours for 24-48 hours.  If you stay in the Yellow Zone for more than 12-24 hours, contact your doctor.  If you do not return to the Green Zone in 12-24 hours or you get worse, start taking your oral steroid medicine if prescribed by your provider.           RED ZONE Medical Alert - Get Help  I have ANY of these:  I feel awful  Medicine is not helping  Breathing getting harder  Trouble walking or talking  Nose opens wide to breathe       Take your rescue medicine NOW  If your provider has prescribed an oral steroid medicine, start taking it NOW  Call your doctor NOW  If you are still in the Red Zone after 20 minutes and you have not reached your doctor:  Take your rescue medicine again and  Call 911 or go to the emergency room right away    See your regular doctor within 2 weeks of an Emergency Room or Urgent Care visit for follow-up  treatment.          Annual Reminders:  Meet with Asthma Educator,  Flu Shot in the Fall, consider Pneumonia Vaccination for patients with asthma (aged 19 and older).    Pharmacy: [facility] [place] - [address]    Electronically signed by Giselle Mcdonnell MD   Date: 03/26/25                    Asthma Triggers  How To Control Things That Make Your Asthma Worse    Triggers are things that make your asthma worse.  Look at the list below to help you find your triggers and   what you can do about them. You can help prevent asthma flare-ups by staying away from your triggers.      Trigger                                                          What you can do   Cigarette Smoke  Tobacco smoke can make asthma worse. Do not allow smoking in your home, car or around you.  Be sure no one smokes at a child s day care or school.  If you smoke, ask your health care provider for ways to help you quit.  Ask family members to quit too.  Ask your health care provider for a referral to Quit Plan to help you quit smoking, or call 8-720-969-PLAN.     Colds, Flu, Bronchitis  These are common triggers of asthma. Wash your hands often.  Don t touch your eyes, nose or mouth.  Get a flu shot every year.     Dust Mites  These are tiny bugs that live in cloth or carpet. They are too small to see. Wash sheets and blankets in hot water every week.   Encase pillows and mattress in dust mite proof covers.  Avoid having carpet if you can. If you have carpet, vacuum weekly.   Use a dust mask and HEPA vacuum.   Pollen and Outdoor Mold  Some people are allergic to trees, grass, or weed pollen, or molds. Try to keep your windows closed.  Limit time out doors when pollen count is high.   Ask you health care provider about taking medicine during allergy season.     Animal Dander  Some people are allergic to skin flakes, urine or saliva from pets with fur or feathers. Keep pets with fur or feathers out of  your home.    If you can t keep the pet outdoors, then keep the pet out of your bedroom.  Keep the bedroom door closed.  Keep pets off cloth furniture and away from stuffed toys.     Mice, Rats, and Cockroaches  Some people are allergic to the waste from these pests.   Cover food and garbage.  Clean up spills and food crumbs.  Store grease in the refrigerator.   Keep food out of the bedroom.   Indoor Mold  This can be a trigger if your home has high moisture. Fix leaking faucets, pipes, or other sources of water.   Clean moldy surfaces.  Dehumidify basement if it is damp and smelly.   Smoke, Strong Odors, and Sprays  These can reduce air quality. Stay away from strong odors and sprays, such as perfume, powder, hair spray, paints, smoke incense, paint, cleaning products, candles and new carpet.   Exercise or Sports  Some people with asthma have this trigger. Be active!  Ask your doctor about taking medicine before sports or exercise to prevent symptoms.    Warm up for 5-10 minutes before and after sports or exercise.     Other Triggers of Asthma  Cold air:  Cover your nose and mouth with a scarf.  Sometimes laughing or crying can be a trigger.  Some medicines and food can trigger asthma.

## 2025-03-26 NOTE — PATIENT INSTRUCTIONS
"Patient Education   Based on our discussion, I have outlined the following instructions for you:    - Check your cholesterol levels today.  - Get your liver and kidney functions, glucose, and thyroid levels checked today.  - Remember your mammogram is scheduled for March 27, 2025. Consider asking about a 3D mammogram.  - Think about seeing a gastroenterologist for your reflux symptoms and occasional nausea and vomiting. Referral placed. ? Some mild gastroparesis.   - Refill your alendronate prescription for osteoporosis.  - Plan for a bone density test in fall 2025. Ordered.   - Include resistance training in your routine and aim for 50-60 grams of protein daily to help your muscles and bone density as well.   - Refill your fluocinonide ointment for dermatitis.  - Refill your venlafaxine prescription for anxiety.  - Refill your albuterol inhaler for your upcoming trip to [facility].  - Get your fasting blood sugar, liver and kidney functions, glucose, thyroid levels, and vitamin D level checked today.  - Updated your asthma action plan.  - Consider getting a shingles vaccination.    Thank you again for your visit, and we look forward to supporting you in your journey to better health.     Check with your insurance re: coverage for the shingles vaccine - Shingrix. - it is a 2 part shot. 2-6 months apart.  You can always be late on the second shot, just not early.  Ask insurance is the Shingrix better covered in pharmacy or clinic? It can be as much as $300 per shot out of pocket for you, if we don't get the location correct. You don't need an order to get them at any major  pharmacy.     You can do your vaccinations/immunizations and/or BP checks in any [place] pharmacy:     To make a pharmacy only appointment online, please go to [email or web address] and select \"Click here to schedule a Vaccination or Blood Pressure Check at available [place] [facility]acies \" at the bottom of the first page.  You can then select a " location, then date and time bubbles that best fits your schedule.          Try to get 50g of protein daily. If you need to supplement whole food, then can try Whey protein or Soy protein supplement drinks.     1Up Protein - clear whey Protein - [employer] [email or web address]       Recommend calcium 1200mg daily and  vitamin D 1000iu daily in the summer and 2000iu in the fall, winter and spring, and daily exercise with some resistance training to help keep your bones strong. Recent research has shown that daily or every other day weight bearing exercise with resistance training is especially important in maintaining and increasing bone density and preventing osteoporosis.     3 dairy servings/day is recommended to achieve the above if you don't want to supplement.  1 dairy serving = 1-8oz glass of milk, 1-1oz piece of cheese or 1-4oz yogurt.      If you take the above with magnesium 250mg to 400mg daily, you should not get constipation. The magnesium can also help prevent leg cramps and helps the calcium absorb a bit better.     Highly recommend resistance training to help keep bones strong and decrease age -related muscle loss, decreasing insulin resistance and increasing basal metabolic rate to help burn more calories at rest and with exertion.     Look at YouTube for free exercises for basic weight training for biceps, triceps, deltoids, low back, hips, hamstrings, quadriceps, and calf muscles to help strengthen your bones as well as the below exercises.     Also recommend:  a collagen supplement with FORTIGEL , FORTIBONE and /or VERISOL  in it, such as:   Whole Body Collagen by [employer].  It is a little expensive.  About $70 per 13.8oz cannister from [employer].com.  Another is Algae-Stephen collagen supplement. = about $59/ cannister.  You can get both  at some [employer] [facility]  or on [employer].com.  They  a finely milled collagen supplement that is unflavored. Some to the others such as Vital Proteins Collagen Peptides have  a pretty strong beef/cow or pig flavor to them and may not have  FORTIGEL , FORTIBONE and /or VERISOL  in it.  The evidence on the benefits of whole body collagen on joint health, bone density and overall skin health are still being studied, but the initial research on the Whole Body Collagen and Algae-Stephen collagen looks promising.  No significant adverse side effects or drug-to-drug interactions have been found as yet.   Look for one with FORTIGEL , FORTIBONE and /or VERISOL  in it.   I have no financial affiliation with Whole Body Collagen or Algae-Stephen.        Preventive Care Advice   This is general advice given by our system to help you stay healthy. However, your care team may have specific advice just for you. Please talk to your care team about your preventive care needs.  Nutrition  Eat 5 or more servings of fruits and vegetables each day.  Try wheat bread, brown rice and whole grain pasta (instead of white bread, rice, and pasta).  Get enough calcium and vitamin D. Check the label on foods and aim for 100% of the RDA (recommended daily allowance).  Lifestyle  Exercise at least 150 minutes each week  (30 minutes a day, 5 days a week).  Do muscle strengthening activities 2 days a week. These help control your weight and prevent disease.  No smoking.  Wear sunscreen to prevent skin cancer.  Have a dental exam and cleaning every 6 months.  Yearly exams  See your health care team every year to talk about:  Any changes in your health.  Any medicines your care team has prescribed.  Preventive care, family planning, and ways to prevent chronic diseases.  Shots (vaccines)   HPV shots (up to age 26), if you've never had them before.  Hepatitis B shots (up to age 59), if you've never had them before.  COVID-19 shot: Get this shot when it's due.  Flu shot: Get a flu shot every year.  Tetanus shot: Get a tetanus shot every 10 years.  Pneumococcal, hepatitis A, and RSV shots: Ask your care team if you need these based on  your risk.  Shingles shot (for age 50 and up)  General health tests  Diabetes screening:  Starting at age 35, Get screened for diabetes at least every 3 years.  If you are younger than age 35, ask your care team if you should be screened for diabetes.  Cholesterol test: At age 39, start having a cholesterol test every 5 years, or more often if advised.  Bone density scan (DEXA): At age 50, ask your care team if you should have this scan for osteoporosis (brittle bones).  Hepatitis C: Get tested at least once in your life.  STIs (sexually transmitted infections)  Before age 24: Ask your care team if you should be screened for STIs.  After age 24: Get screened for STIs if you're at risk. You are at risk for STIs (including HIV) if:  You are sexually active with more than one person.  You don't use condoms every time.  You or a partner was diagnosed with a sexually transmitted infection.  If you are at risk for HIV, ask about PrEP medicine to prevent HIV.  Get tested for HIV at least once in your life, whether you are at risk for HIV or not.  Cancer screening tests  Cervical cancer screening: If you have a cervix, begin getting regular cervical cancer screening tests starting at age 21.  Breast cancer scan (mammogram): If you've ever had breasts, begin having regular mammograms starting at age 40. This is a scan to check for breast cancer.  Colon cancer screening: It is important to start screening for colon cancer at age 45.  Have a colonoscopy test every 10 years (or more often if you're at risk) Or, ask your provider about stool tests like a FIT test every year or Cologuard test every 3 years.  To learn more about your testing options, visit:   .  For help making a decision, visit:   https://bit.ly/bu50863.  Prostate cancer screening test: If you have a prostate, ask your care team if a prostate cancer screening test (PSA) at age 55 is right for you.  Lung cancer screening: If you are a current or former smoker ages  "50 to 80, ask your care team if ongoing lung cancer screenings are right for you.  For informational purposes only. Not to replace the advice of your health care provider. Copyright   2023 [facility] [employer] [facility]. All rights reserved. Clinically reviewed by the [facility] Transitions Program. [employer] [id number] - REV 01/24.  Thank you so much or choosing [facility]  for your Health Care. It was a pleasure seeing you at your visit today! Please contact us with any questions or concerns you may have.                   Giselle Mcdonnell MD                              To reach your [facility] care team after hours call:   [phone] press #2 \"to speak with your care team\".  This will get you to our clinic instead of routing to central [facility]  scheduling.     PLEASE NOTE OUR HOURS HAVE CHANGED secondary to COVID-19 coronavirus pandemic, as we are trying to minimize patient exposure to the virus,  which is now widespread in the [facility].  These hours may change with very little notice.  We apologize for any inconvenience.       Our current clinic hours are:          Monday- Thursday   7:00am - 6:00pm  in person.      Friday  7:00am- 5:00pm                       Saturday and Sunday : Closed to in person and virtual visits        We have telephone and virtual visit times available between    7:00am - 6pm on Monday-Friday as well.                                                Phone:  [phone]      Our pharmacy hours: Monday through Friday 8:00am to 5:00pm                        Saturday - 9:00 am to 12 noon       Sunday : Closed.              Phone:  [phone]              ###  Please note: at this time we are not accepting any walk-in visits. ###      There is also information available at our web site:  [email or web address]    If your provider ordered any lab tests and you do not receive the results within 10 business days, please call the " clinic.    If you need a medication refill please contact your pharmacy.  Please allow 3 business days for your refill to be completed.    Our clinic offers telephone visits and e visits.  Please ask one of your team members to explain more.      Use [employer] (secure email communication and access to your chart) to send your primary care provider a message or make an appointment. Ask someone on your Team how to sign up for [employer].

## 2025-03-26 NOTE — PROGRESS NOTES
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 of Systems  Constitutional, HEENT, cardiovascular, pulmonary, GI, , musculoskeletal, neuro, skin, endocrine and psych systems are negative, except as otherwise noted.     Objective    Exam  /64   Pulse 82   Temp (!) 96.4  F (35.8  C) (Tympanic)   Resp 16   Ht 1.626 m (5' 4\")   Wt 49.7 kg (109 lb 9.6 oz)   LMP 05/08/2018 (Exact Date)   SpO2 98%   BMI 18.81 kg/m     Estimated body mass index is 18.81 kg/m  as calculated from the following:    Height as of this encounter: 1.626 m (5' 4\").    Weight as of this encounter: 49.7 kg (109 lb 9.6 oz).    Physical Exam  GENERAL: alert and no distress  EYES: Eyes grossly normal to inspection, PERRL and conjunctivae and sclerae normal  HENT: ear canals and TM's normal, nose and mouth without ulcers or lesions  NECK: no adenopathy, no asymmetry, masses, or scars  RESP: lungs clear to auscultation - no rales, rhonchi or wheezes  BREAST: normal without masses, tenderness or nipple discharge and no palpable axillary masses or adenopathy  CV: regular rate and rhythm, normal S1 S2, no S3 or S4, no murmur, click or rub, no peripheral edema  ABDOMEN: soft, nontender, no hepatosplenomegaly, no masses and bowel sounds normal   (female) w/bimanual: normal " female external genitalia, normal urethral meatus, normal vaginal mucosa, and normal adnexa, cervix and uterus are surgically absent. Pelvis = without masses or discharge  MS: no gross musculoskeletal defects noted, no edema  SKIN: no suspicious lesions or rashes  NEURO: Normal strength and tone, mentation intact and speech normal  PSYCH: mentation appears normal, affect normal/bright        Signed Electronically by: Giselle Mcdonnell MD

## 2025-03-27 ENCOUNTER — HOSPITAL ENCOUNTER (OUTPATIENT)
Dept: MAMMOGRAPHY | Facility: CLINIC | Age: 55
Discharge: HOME | End: 2025-03-27
Attending: INTERNAL MEDICINE
Payer: COMMERCIAL

## 2025-03-27 DIAGNOSIS — C50.811 MALIGNANT NEOPLASM OF OVERLAPPING SITES OF RIGHT FEMALE BREAST (H): ICD-10-CM

## 2025-03-27 DIAGNOSIS — Z12.31 VISIT FOR SCREENING MAMMOGRAM: ICD-10-CM

## 2025-03-27 PROCEDURE — 77067 SCR MAMMO BI INCL CAD: CPT

## 2025-03-27 PROCEDURE — 77063 BREAST TOMOSYNTHESIS BI: CPT

## 2025-03-27 NOTE — TELEPHONE ENCOUNTER
REFERRAL INFORMATION:  Referring Provider:  Giselle Mcdonnell MD   Referring Clinic:  [facility]   Reason for Visit/Diagnosis:   K21.9 (ICD-10-CM) - Gastroesophageal reflux disease without esophagitis   R11.2 (ICD-10-CM) - Nausea and vomiting, unspecified vomiting type        FUTURE VISIT INFORMATION:  Appointment Date: 4/22/25     NOTES STATUS DETAILS   OFFICE NOTE from Referring Provider Internal 3/26/25, 1/18/23   MEDICATION LIST Internal    PROCEDURES     COLONOSCOPY Internal 7/18/23   STOOL TESTING     LABS     PERTINENT LABS Internal    IMAGES

## 2025-03-29 LAB
DEPRECATED CALCIDIOL+CALCIFEROL SERPL-MC: <65 UG/L (ref 20–75)
VITAMIN D2 SERPL-MCNC: <5 UG/L
VITAMIN D3 SERPL-MCNC: 60 UG/L

## 2025-04-22 ENCOUNTER — VIRTUAL VISIT (OUTPATIENT)
Dept: GASTROENTEROLOGY | Facility: CLINIC | Age: 55
End: 2025-04-22
Attending: FAMILY MEDICINE
Payer: COMMERCIAL

## 2025-04-22 ENCOUNTER — PRE VISIT (OUTPATIENT)
Dept: GASTROENTEROLOGY | Facility: CLINIC | Age: 55
End: 2025-04-22

## 2025-04-22 DIAGNOSIS — R13.19 ESOPHAGEAL DYSPHAGIA: ICD-10-CM

## 2025-04-22 DIAGNOSIS — R63.0 POOR APPETITE: ICD-10-CM

## 2025-04-22 DIAGNOSIS — R11.2 NAUSEA AND VOMITING, UNSPECIFIED VOMITING TYPE: Primary | ICD-10-CM

## 2025-04-22 PROCEDURE — 98002 SYNCH AUDIO-VIDEO NEW MOD 45: CPT | Performed by: PHYSICIAN ASSISTANT

## 2025-04-22 PROCEDURE — 1126F AMNT PAIN NOTED NONE PRSNT: CPT | Mod: 95 | Performed by: PHYSICIAN ASSISTANT

## 2025-04-22 NOTE — NURSING NOTE
Current patient location: [address] [place] [id number]    Is the patient currently in the state of [place]? YES    Visit mode: VIDEO    If the visit is dropped, the patient can be reconnected by:VIDEO VISIT: Text to cell phone:   Telephone Information:   Mobile [phone]       Will anyone else be joining the visit? NO  (If patient encounters technical issues they should call [phone])    Are changes needed to the allergy or medication list? No    Are refills needed on medications prescribed by this physician? NO    Rooming Documentation:  Questionnaire(s) completed    Reason for visit: Consult    Gentry SEGURAF

## 2025-04-22 NOTE — PATIENT INSTRUCTIONS
It was a pleasure taking care of you today.  I've included a brief summary of our discussion and care plan from today's visit below.  Please review this information with your primary care provider.  _______________________________________________________________________    My recommendations are summarized as follows:    X-ray of esophagus and ultrasound, please call below Imaging number to schedule  EGD / upper endoscopy ordered - please call below Procedures number to schedule   H.pylori stool ordered - call to schedule an appt with any [facility] lab     Return to GI Clinic in 2 wk after EGD to review your progress.     Please see below for any additional questions and scheduling guidelines.    Sign up for [facility]: [facility] patient portal serves as a secure platform for accessing your medical records from the [facility]. Additionally, [employer] facilitates easy, timely, and secure messaging with your care team. If you have not signed up, you may do so by using the provided code or calling [phone].    Coordinating your care after your visit:  There are multiple options for scheduling your follow-up care based on your provider's recommendation.    How do I schedule a follow-up clinic appointment:   After your appointment, you may receive scheduling assistance with the Clinic Coordinators by having a seat in the waiting room and a Clinic Coordinator will call you up to schedule.  Virtual visits or after you leave the clinic:  Your provider has placed a follow-up order in the [facility] [employer] portal for scheduling your return appointment. A member of the scheduling team will contact you to schedule.  [facility] Scheduling: Timely scheduling through [facility] [employer] is advised to ensure appointment availability.   Call to schedule: You may schedule your follow-up appointment(s) by calling [phone], option 1.    How do I schedule my endoscopy or colonoscopy procedure:  If a procedure, such as a colonoscopy or  upper endoscopy was ordered by your provider, the scheduling team will contact you to schedule this procedure. Or you may choose to call to schedule at   [phone], option 2.  Please allow 20-30 minutes when scheduling a procedure.    How do I get my blood work done? To get your blood work done, you need to schedule a lab appointment at an [facility]. There are multiple ways to schedule:   At the clinic: The Clinic Coordinator you meet after your visit can help you schedule a lab appointment.   [facility] scheduling: [employer] offers online lab scheduling at all [facility] laboratory locations.   Call to schedule: You can call [phone] to schedule your lab appointment.    How do I schedule my imaging study: To schedule imaging studies, such as CT scans, ultrasounds, MRIs, or X-rays, contact Imaging Services at [phone].    How do I schedule a referral to another doctor: If your provider recommended a referral to another specialist(s), the referral order was placed by your provider. You will receive a phone call to schedule this referral, or you may choose to call the number attached to the referral to self-schedule.    For Post-Visit Question(s):  For any inquiries following today's visit:  Please utilize [employer] messaging and allow 48 hours for reply or contact the Call Center during normal business hours at [phone], option 3.  For Emergent After-hours questions, contact the On-Call GI Fellow through the [facility] at [phone].  In addition, you may contact your Nurse directly using the provided contact information.    Test Results:  Test results will be accessible via [employer] in compliance with the 21st Century Cures Act. This means that your results will be available to you at the same time as your provider. Often you may see your results before your provider does. Results are reviewed by staff within two weeks with communication follow-up. Results may  be released in the patient portal prior to your care team review.    Prescription Refill(s):  Medication prescribed by your provider will be addressed during your visit. For future refills, please coordinate with your pharmacy. If you have not had a recent clinic visit or routine labs, for your safety, your provider may not be able to refill your prescription.     Sincerely,    Juliette Grier PA-C  Gastroenterology
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
## 2025-05-05 ENCOUNTER — TELEPHONE (OUTPATIENT)
Dept: GASTROENTEROLOGY | Facility: CLINIC | Age: 55
End: 2025-05-05
Payer: COMMERCIAL

## 2025-05-05 NOTE — TELEPHONE ENCOUNTER
Left Voicemail (1st Attempt) and Sent Mychart (1st Attempt) for the patient to call back and schedule the following:    Appointment type: Return  Provider: MOSHE Colin   Return date: 6/22 ~ Approx.   Specialty phone number: [phone]  Additional appointment(s) needed: US and XR ESO   Additonal Notes:     Follow up per provider req 5/5 AA

## 2025-05-06 ENCOUNTER — TELEPHONE (OUTPATIENT)
Dept: GASTROENTEROLOGY | Facility: CLINIC | Age: 55
End: 2025-05-06
Payer: COMMERCIAL

## 2025-05-06 NOTE — TELEPHONE ENCOUNTER
"Endoscopy Scheduling Screen    Caller: patient    Have you had any respiratory illness or flu-like symptoms in the last 10 days?  No    What is your communication preference for Instructions and/or Bowel Prep?   MyChart    What insurance is in the chart?  Other:  [facility]    Ordering/Referring Provider: Juliette Grier PA-C in [facility]     (If ordering provider performs procedure, schedule with ordering provider unless otherwise instructed. )    BMI: Estimated body mass index is 18.81 kg/m  as calculated from the following:    Height as of 3/26/25: 1.626 m (5' 4\").    Weight as of 3/26/25: 49.7 kg (109 lb 9.6 oz).     Sedation Ordered  MAC/deep sedation.   BMI<= 45 45 < BMI <= 48 48 < BMI < = 50  BMI > 50   No Restrictions No [facility]  No [facility]  [facility] with exceptions Hospital Only OR Only       Do you have a history of malignant hyperthermia?  No    (Females) Are you currently pregnant?   No     Have you been diagnosed or told you have pulmonary hypertension?   No    Do you have an LVAD?  No    Have you been told you have moderate to severe sleep apnea?  No.    Have you been told you have COPD, asthma, or any other lung disease?  Yes     What breathing problems do you have?  Asthma     Do you use home oxygen?  No    Have your breathing problems required an ED visit or hospitalization in the last year?  No.    Has your doctor ordered any cardiac tests like echo, angiogram, stress test, ablation, or EKG, that you have not completed yet?  No    Do you  have a history of any heart conditions?  No     Have you ever had or are you waiting for an organ transplant?  No. Continue scheduling, no site restrictions.    Have you had a stroke or transient ischemic attack (TIA aka \"mini stroke\") in the last 2 years?   No.    Have you been diagnosed with or been told you have cirrhosis of the liver?   No.    Are you currently on dialysis?   No    Do you need assistance transferring?   No    BMI: Estimated body mass index " "is 18.81 kg/m  as calculated from the following:    Height as of 3/26/25: 1.626 m (5' 4\").    Weight as of 3/26/25: 49.7 kg (109 lb 9.6 oz).     Is patients BMI > 40 and scheduling location [facility]?  No    Do you take an injectable or oral medication for weight loss or diabetes (excluding insulin)?  No    Do you take the medication Naltrexone?  No    Do you take blood thinners?  No       Prep   Are you currently on dialysis or do you have chronic kidney disease?  No    Do you have a diagnosis of diabetes?  No    Do you have a diagnosis of cystic fibrosis (CF)?  No    On a regular basis do you go 3 -5 days between bowel movements?  No    BMI > 40?  No    Preferred Pharmacy:    [facility] [address] [facility]  [address] [place] [id number]  Phone: [phone] Fax: [phone] Alternate Fax: [phone], [phone]    Final Scheduling Details     Procedure scheduled  Upper endoscopy (EGD)    Surgeon:       Date of procedure:  7/24     Pre-OP / PAC:   No - Not required for this site.    Location  [facility] - Patient preference.    Sedation   MAC/Deep Sedation - Per order.      Patient Reminders:   You will receive a call from a Nurse to review instructions and health history.  This assessment must be completed prior to your procedure.  Failure to complete the Nurse assessment may result in the procedure being cancelled.      On the day of your procedure, please designate an adult(s) who can drive you home stay with you for the next 24 hours. The medicines used in the exam will make you sleepy. You will not be able to drive.      You cannot take public transportation, ride share services, or non-medical taxi service without a responsible caregiver.  Medical transport services are allowed with the requirement that a responsible caregiver will receive you at your destination.  We require that drivers and caregivers are confirmed prior to your procedure.    "

## 2025-07-08 ENCOUNTER — TELEPHONE (OUTPATIENT)
Dept: GASTROENTEROLOGY | Facility: CLINIC | Age: 55
End: 2025-07-08
Payer: COMMERCIAL

## 2025-07-08 NOTE — TELEPHONE ENCOUNTER
Pre visit planning completed.      Procedure details:    Patient scheduled for Upper endoscopy (EGD) on 7/24/25.     Arrival time: 0930. Procedure time 1030    Facility location: [facility]; [address] [place] [address], 5[address] Floor, [place] [address]. Check in location: 5th Floor.    Sedation type: MAC    Pre op exam needed? No.    Indication for procedure: nausea, vomiting, dysphagia      Chart review:     Electronic implanted devices? No    Recent diagnosis of diverticulitis within the last 6 weeks? N/A      Medication review:    Diabetic? No    Anticoagulants? No    Weight loss medication/injectable? No GLP-1 medication per patient's medication list. Nursing to verify with pre-assessment call.    Other medication HOLDING recommendations:  N/A      Prep for procedure:     Procedure information and instructions sent via [employer]         Jackie Buitrago RN  Endoscopy Procedure Pre Assessment   [phone] option 3

## 2025-07-08 NOTE — TELEPHONE ENCOUNTER
Pre assessment completed for upcoming procedure.   (Please see previous telephone encounter notes for complete details)    Procedure details:    Procedure date 7/24/25, arrival time 0930 and facility location reviewed.    Pre op exam needed? No.    Designated  policy reviewed. Instructed to have someone stay 24  hours post procedure.     Medication review:    Medications reviewed. Please see supporting documentation below. Holding recommendations discussed (if applicable).     Prep for procedure:     Procedure prep instructions reviewed.      Any additional information needed:  N/A    Patient verbalized understanding and had no questions or concerns at this time.      Beth Barone LPN  Endoscopy Procedure Pre Assessment   [phone] option 3

## 2025-07-24 ENCOUNTER — ANESTHESIA (OUTPATIENT)
Dept: SURGERY | Facility: AMBULATORY SURGERY CENTER | Age: 55
End: 2025-07-24
Payer: COMMERCIAL

## 2025-07-24 ENCOUNTER — ANESTHESIA EVENT (OUTPATIENT)
Dept: SURGERY | Facility: AMBULATORY SURGERY CENTER | Age: 55
End: 2025-07-24
Payer: COMMERCIAL

## 2025-07-24 VITALS — HEART RATE: 80 BPM

## 2025-07-24 PROCEDURE — 88305 TISSUE EXAM BY PATHOLOGIST: CPT | Mod: 26 | Performed by: PATHOLOGY

## 2025-07-24 PROCEDURE — 88305 TISSUE EXAM BY PATHOLOGIST: CPT | Mod: TC | Performed by: INTERNAL MEDICINE

## 2025-07-24 RX ORDER — GLYCOPYRROLATE 0.2 MG/ML
INJECTION, SOLUTION INTRAMUSCULAR; INTRAVENOUS PRN
Status: DISCONTINUED | OUTPATIENT
Start: 2025-07-24 | End: 2025-07-24

## 2025-07-24 RX ORDER — LIDOCAINE HYDROCHLORIDE 20 MG/ML
INJECTION, SOLUTION INFILTRATION; PERINEURAL PRN
Status: DISCONTINUED | OUTPATIENT
Start: 2025-07-24 | End: 2025-07-24

## 2025-07-24 RX ORDER — PROPOFOL 10 MG/ML
INJECTION, EMULSION INTRAVENOUS PRN
Status: DISCONTINUED | OUTPATIENT
Start: 2025-07-24 | End: 2025-07-24

## 2025-07-24 RX ORDER — PROPOFOL 10 MG/ML
INJECTION, EMULSION INTRAVENOUS CONTINUOUS PRN
Status: DISCONTINUED | OUTPATIENT
Start: 2025-07-24 | End: 2025-07-24

## 2025-07-24 RX ADMIN — LIDOCAINE HYDROCHLORIDE 40 MG: 20 INJECTION, SOLUTION INFILTRATION; PERINEURAL at 10:31

## 2025-07-24 RX ADMIN — PROPOFOL 40 MG: 10 INJECTION, EMULSION INTRAVENOUS at 10:31

## 2025-07-24 RX ADMIN — GLYCOPYRROLATE 0.2 MG: 0.2 INJECTION, SOLUTION INTRAMUSCULAR; INTRAVENOUS at 10:31

## 2025-07-24 RX ADMIN — SODIUM CHLORIDE, SODIUM LACTATE, POTASSIUM CHLORIDE, CALCIUM CHLORIDE: 600; 310; 30; 20 INJECTION, SOLUTION INTRAVENOUS at 10:27

## 2025-07-24 RX ADMIN — PROPOFOL 200 MCG/KG/MIN: 10 INJECTION, EMULSION INTRAVENOUS at 10:31
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
## 2025-07-24 NOTE — ANESTHESIA POSTPROCEDURE EVALUATION
Patient: Jono Templeton    Procedure: Procedure(s):  ESOPHAGOGASTRODUODENOSCOPY, WITH DILATION USING PASCUAL DILATOR OVER GUIDEWIRE, WITH BIOPSY       Anesthesia Type:  No value filed.    Note:  Disposition: Outpatient   Postop Pain Control: Uneventful            Sign Out: Well controlled pain   PONV: No   Neuro/Psych: Uneventful            Sign Out: Acceptable/Baseline neuro status   Airway/Respiratory: Uneventful            Sign Out: Acceptable/Baseline resp. status   CV/Hemodynamics: Uneventful            Sign Out: Acceptable CV status   Other NRE: NONE   DID A NON-ROUTINE EVENT OCCUR? No           Last vitals:  Vitals Value Taken Time   /70 07/24/25 11:00   Temp 36.1  C (97  F) 07/24/25 11:00   Pulse 77 07/24/25 11:00   Resp 16 07/24/25 11:00   SpO2 100 % 07/24/25 11:09   Vitals shown include unfiled device data.    Electronically Signed By: Dru Markham MD  July 24, 2025  4:49 PM

## 2025-07-24 NOTE — ANESTHESIA CARE TRANSFER NOTE
Patient: Jono Templeton    Procedure: Procedure(s):  ESOPHAGOGASTRODUODENOSCOPY, WITH DILATION USING IWONA-BILL DILATOR OVER GUIDEWIRE, WITH BIOPSY       Diagnosis: Nausea and vomiting, unspecified vomiting type [R11.2]  Esophageal dysphagia [R13.19]  Diagnosis Additional Information: No value filed.    Anesthesia Type:   No value filed.     Note:    Oropharynx: oropharynx clear of all foreign objects  Level of Consciousness: awake  Oxygen Supplementation: room air    Independent Airway: airway patency satisfactory and stable  Dentition: dentition unchanged  Vital Signs Stable: post-procedure vital signs reviewed and stable  Report to RN Given: handoff report given  Patient transferred to: PACU    Handoff Report: Identifed the Patient, Identified the Reponsible Provider, Reviewed the pertinent medical history, Discussed the surgical course, Reviewed Intra-OP anesthesia mangement and issues during anesthesia, Set expectations for post-procedure period and Allowed opportunity for questions and acknowledgement of understanding      Vitals:  Vitals Value Taken Time   BP 96/59 07/24/25 10:49   Temp 36.3  C (97.3  F) 07/24/25 10:50   Pulse 78 07/24/25 10:49   Resp 16 07/24/25 10:50   SpO2 97 % 07/24/25 10:53   Vitals shown include unfiled device data.    Electronically Signed By: ALICIA Alva CRNA  July 24, 2025  10:53 AM

## 2025-08-11 ENCOUNTER — VIRTUAL VISIT (OUTPATIENT)
Dept: GASTROENTEROLOGY | Facility: CLINIC | Age: 55
End: 2025-08-11
Attending: PHYSICIAN ASSISTANT
Payer: COMMERCIAL

## 2025-08-11 VITALS — WEIGHT: 108 LBS | BODY MASS INDEX: 18.44 KG/M2 | HEIGHT: 64 IN

## 2025-08-11 DIAGNOSIS — R10.11 BILATERAL UPPER ABDOMINAL DISCOMFORT: ICD-10-CM

## 2025-08-11 DIAGNOSIS — R10.12 BILATERAL UPPER ABDOMINAL DISCOMFORT: ICD-10-CM

## 2025-08-11 DIAGNOSIS — R13.19 ESOPHAGEAL DYSPHAGIA: ICD-10-CM

## 2025-08-11 DIAGNOSIS — R11.2 NAUSEA AND VOMITING, UNSPECIFIED VOMITING TYPE: Primary | ICD-10-CM

## 2025-08-11 PROCEDURE — 1126F AMNT PAIN NOTED NONE PRSNT: CPT | Mod: 95 | Performed by: PHYSICIAN ASSISTANT

## 2025-08-11 PROCEDURE — 98005 SYNCH AUDIO-VIDEO EST LOW 20: CPT | Performed by: PHYSICIAN ASSISTANT

## 2025-08-11 ASSESSMENT — PAIN SCALES - GENERAL: PAINLEVEL_OUTOF10: NO PAIN (0)

## (undated) DEVICE — PREP CHLORAPREP 26ML TINTED HI-LITE ORANGE 930815

## (undated) DEVICE — SU SILK 2-0 PSL 18" 673H

## (undated) DEVICE — GLOVE PROTEXIS MICRO 6.5  2D73PM65

## (undated) DEVICE — SU VICRYL 3-0 SH 27" UND J416H

## (undated) DEVICE — ESU PENCIL W/HOLSTER E2350H

## (undated) DEVICE — GLOVE PROTEXIS POWDER FREE 8.0 ORTHOPEDIC 2D73ET80

## (undated) DEVICE — TUBING SUCTION 12"X1/4" N612

## (undated) DEVICE — SYR EAR BULB 3OZ 0035830

## (undated) DEVICE — SU VICRYL 4-0 PS-2 18" UND J496H

## (undated) DEVICE — ADH SKIN CLOSURE PREMIERPRO EXOFIN MICOR HV 0.5ML 3471

## (undated) DEVICE — GLOVE PROTEXIS POWDER FREE SMT 7.5  2D72PT75X

## (undated) DEVICE — LINEN FULL SHEET 5511

## (undated) DEVICE — DRSG TELFA 2X3"

## (undated) DEVICE — LINEN HALF SHEET 5512

## (undated) DEVICE — SU VICRYL 3-0 TIE 12X18" J904T

## (undated) DEVICE — SOL WATER BACTERIOSTATIC 30ML VIAL

## (undated) DEVICE — LINEN TOWEL PACK X10 5473

## (undated) DEVICE — DRSG TEGADERM 4X4 3/4" 1626W

## (undated) DEVICE — ESU ELEC BLADE 2.75" COATED/INSULATED E1455

## (undated) DEVICE — SUCTION CANISTER MEDIVAC LINER 3000ML W/LID 65651-530

## (undated) DEVICE — CLIP ETHICON LIGACLIP MED WHITE LT200

## (undated) DEVICE — DRAPE LAP W/ARMBOARD 29410

## (undated) DEVICE — PACK MINOR CUSTOM RIDGES SBA32RMRMA

## (undated) DEVICE — SOL NACL 0.9% IRRIG 1000ML BOTTLE 2F7124

## (undated) DEVICE — BAG CLEAR TRASH 1.3M 39X33" P4040C

## (undated) DEVICE — GOWN IMPERVIOUS ZONED XLG 9041

## (undated) DEVICE — PREP POVIDONE-IODINE 7.5% SCRUB 4OZ BOTTLE MDS093945

## (undated) DEVICE — PREP POVIDONE IODINE SOLUTION 10% 4OZ BOTTLE 29906-004

## (undated) DEVICE — GLOVE PROTEXIS BLUE W/NEU-THERA 7.0  2D73EB70

## (undated) DEVICE — DRAPE C-ARM 60X42" 1013

## (undated) DEVICE — SOL NACL 0.9% INJ 250ML BAG 2B1322Q

## (undated) DEVICE — ESU GROUND PAD ADULT W/CORD E7507

## (undated) DEVICE — NDL BLUNT 18GA 1.5" FILTER 305211

## (undated) DEVICE — DRAPE LAP PEDS DISP 29492

## (undated) DEVICE — BLADE KNIFE SURG 15 371115

## (undated) DEVICE — ESU PENCIL W/SMOKE EVAC CVPLP2000

## (undated) DEVICE — ESU HARMONIC FOCUS SHEARS CVD 9CM HAR9F

## (undated) DEVICE — PAD CHUX UNDERPAD 30X36" P3036C

## (undated) DEVICE — SPONGE RAY-TEC 4X8" 7318

## (undated) DEVICE — PREP SKIN SCRUB TRAY 4461A

## (undated) DEVICE — NDL 22GA 1.5"

## (undated) DEVICE — BLADE KNIFE SURG 10 371110

## (undated) DEVICE — LINEN DRAPE 54X72" 5467

## (undated) DEVICE — SYR 05ML LL W/O NDL

## (undated) DEVICE — DRSG GAUZE 2X2" 8042

## (undated) DEVICE — SYR 10ML SLIP TIP W/O NDL

## (undated) DEVICE — DRAPE MAYO STAND 23X54 8337

## (undated) DEVICE — NDL BLUNT 18GA 1" W/O FILTER 305181

## (undated) DEVICE — NDL 25GA 1.5" 305127

## (undated) DEVICE — NDL COUNTER 20CT 31142493

## (undated) DEVICE — KIT ENDO TURNOVER/PROCEDURE W/CLEAN A SCOPE LINERS 103888

## (undated) DEVICE — LIGHT HANDLE X2

## (undated) RX ORDER — FENTANYL CITRATE 50 UG/ML
INJECTION, SOLUTION INTRAMUSCULAR; INTRAVENOUS
Status: DISPENSED
Start: 2021-09-07

## (undated) RX ORDER — BUPIVACAINE HYDROCHLORIDE 2.5 MG/ML
INJECTION, SOLUTION EPIDURAL; INFILTRATION; INTRACAUDAL
Status: DISPENSED
Start: 2021-03-09

## (undated) RX ORDER — BUPIVACAINE HYDROCHLORIDE 5 MG/ML
INJECTION, SOLUTION EPIDURAL; INTRACAUDAL
Status: DISPENSED
Start: 2021-09-07

## (undated) RX ORDER — LIDOCAINE HYDROCHLORIDE 10 MG/ML
INJECTION, SOLUTION EPIDURAL; INFILTRATION; INTRACAUDAL; PERINEURAL
Status: DISPENSED
Start: 2021-03-09

## (undated) RX ORDER — FENTANYL CITRATE 50 UG/ML
INJECTION, SOLUTION INTRAMUSCULAR; INTRAVENOUS
Status: DISPENSED
Start: 2020-12-29

## (undated) RX ORDER — BUPIVACAINE HYDROCHLORIDE 2.5 MG/ML
INJECTION, SOLUTION EPIDURAL; INFILTRATION; INTRACAUDAL
Status: DISPENSED
Start: 2020-12-29

## (undated) RX ORDER — LIDOCAINE HYDROCHLORIDE 10 MG/ML
INJECTION, SOLUTION EPIDURAL; INFILTRATION; INTRACAUDAL; PERINEURAL
Status: DISPENSED
Start: 2021-09-07

## (undated) RX ORDER — PROPOFOL 10 MG/ML
INJECTION, EMULSION INTRAVENOUS
Status: DISPENSED
Start: 2021-09-07

## (undated) RX ORDER — GLYCOPYRROLATE 0.2 MG/ML
INJECTION INTRAMUSCULAR; INTRAVENOUS
Status: DISPENSED
Start: 2020-12-29

## (undated) RX ORDER — ONDANSETRON 2 MG/ML
INJECTION INTRAMUSCULAR; INTRAVENOUS
Status: DISPENSED
Start: 2021-09-07

## (undated) RX ORDER — CEFAZOLIN SODIUM 2 G/100ML
INJECTION, SOLUTION INTRAVENOUS
Status: DISPENSED
Start: 2020-12-29

## (undated) RX ORDER — FENTANYL CITRATE 50 UG/ML
INJECTION, SOLUTION INTRAMUSCULAR; INTRAVENOUS
Status: DISPENSED
Start: 2021-03-09

## (undated) RX ORDER — DEXAMETHASONE SODIUM PHOSPHATE 4 MG/ML
INJECTION, SOLUTION INTRA-ARTICULAR; INTRALESIONAL; INTRAMUSCULAR; INTRAVENOUS; SOFT TISSUE
Status: DISPENSED
Start: 2020-12-29

## (undated) RX ORDER — CEFAZOLIN SODIUM 2 G/100ML
INJECTION, SOLUTION INTRAVENOUS
Status: DISPENSED
Start: 2021-03-09

## (undated) RX ORDER — CEFAZOLIN SODIUM/WATER 2 G/20 ML
SYRINGE (ML) INTRAVENOUS
Status: DISPENSED
Start: 2021-09-07

## (undated) RX ORDER — LIDOCAINE HYDROCHLORIDE 10 MG/ML
INJECTION, SOLUTION EPIDURAL; INFILTRATION; INTRACAUDAL; PERINEURAL
Status: DISPENSED
Start: 2020-12-29

## (undated) RX ORDER — HEPARIN SODIUM (PORCINE) LOCK FLUSH IV SOLN 100 UNIT/ML 100 UNIT/ML
SOLUTION INTRAVENOUS
Status: DISPENSED
Start: 2021-03-09

## (undated) RX ORDER — PROPOFOL 10 MG/ML
INJECTION, EMULSION INTRAVENOUS
Status: DISPENSED
Start: 2020-12-29

## (undated) RX ORDER — ONDANSETRON 2 MG/ML
INJECTION INTRAMUSCULAR; INTRAVENOUS
Status: DISPENSED
Start: 2020-12-29

## (undated) RX ORDER — OXYCODONE HYDROCHLORIDE 5 MG/1
TABLET ORAL
Status: DISPENSED
Start: 2020-12-29